# Patient Record
Sex: FEMALE | Race: BLACK OR AFRICAN AMERICAN | NOT HISPANIC OR LATINO | Employment: OTHER | URBAN - METROPOLITAN AREA
[De-identification: names, ages, dates, MRNs, and addresses within clinical notes are randomized per-mention and may not be internally consistent; named-entity substitution may affect disease eponyms.]

---

## 2017-04-13 ENCOUNTER — COMMUNICATION - HEALTHEAST (OUTPATIENT)
Dept: SCHEDULING | Facility: CLINIC | Age: 82
End: 2017-04-13

## 2017-04-13 ENCOUNTER — COMMUNICATION - HEALTHEAST (OUTPATIENT)
Dept: INTERNAL MEDICINE | Facility: CLINIC | Age: 82
End: 2017-04-13

## 2017-04-14 ENCOUNTER — OFFICE VISIT - HEALTHEAST (OUTPATIENT)
Dept: INTERNAL MEDICINE | Facility: CLINIC | Age: 82
End: 2017-04-14

## 2017-04-14 DIAGNOSIS — R07.9 CHEST PAIN: ICD-10-CM

## 2017-04-14 DIAGNOSIS — I10 ESSENTIAL HYPERTENSION WITH GOAL BLOOD PRESSURE LESS THAN 140/90: ICD-10-CM

## 2017-04-14 ASSESSMENT — MIFFLIN-ST. JEOR: SCORE: 864.12

## 2017-04-17 ENCOUNTER — HOSPITAL ENCOUNTER (OUTPATIENT)
Dept: CT IMAGING | Facility: CLINIC | Age: 82
Discharge: HOME | End: 2017-04-17
Attending: INTERNAL MEDICINE

## 2017-04-17 ENCOUNTER — COMMUNICATION - HEALTHEAST (OUTPATIENT)
Dept: INTERNAL MEDICINE | Facility: CLINIC | Age: 82
End: 2017-04-17

## 2017-04-17 DIAGNOSIS — R07.9 CHEST PAIN: ICD-10-CM

## 2017-05-03 ENCOUNTER — OFFICE VISIT - HEALTHEAST (OUTPATIENT)
Dept: CARDIOLOGY | Facility: CLINIC | Age: 82
End: 2017-05-03

## 2017-05-03 DIAGNOSIS — R07.9 CHEST PAIN, UNSPECIFIED: ICD-10-CM

## 2017-05-03 DIAGNOSIS — R53.83 OTHER MALAISE AND FATIGUE: ICD-10-CM

## 2017-05-03 DIAGNOSIS — M79.605 PAIN IN BOTH LOWER EXTREMITIES: ICD-10-CM

## 2017-05-03 DIAGNOSIS — R53.81 OTHER MALAISE AND FATIGUE: ICD-10-CM

## 2017-05-03 DIAGNOSIS — M79.604 PAIN IN BOTH LOWER EXTREMITIES: ICD-10-CM

## 2017-05-03 ASSESSMENT — MIFFLIN-ST. JEOR: SCORE: 864.12

## 2017-05-04 LAB
ATRIAL RATE - MUSE: 66 BPM
DIASTOLIC BLOOD PRESSURE - MUSE: NORMAL MMHG
INTERPRETATION ECG - MUSE: NORMAL
P AXIS - MUSE: 34 DEGREES
PR INTERVAL - MUSE: 166 MS
QRS DURATION - MUSE: 76 MS
QT - MUSE: 408 MS
QTC - MUSE: 427 MS
R AXIS - MUSE: -27 DEGREES
SYSTOLIC BLOOD PRESSURE - MUSE: NORMAL MMHG
T AXIS - MUSE: 28 DEGREES
VENTRICULAR RATE- MUSE: 66 BPM

## 2017-05-08 ENCOUNTER — HOSPITAL ENCOUNTER (OUTPATIENT)
Dept: CARDIOLOGY | Facility: CLINIC | Age: 82
Discharge: HOME | End: 2017-05-08
Attending: INTERNAL MEDICINE

## 2017-05-08 DIAGNOSIS — R07.9 CHEST PAIN, UNSPECIFIED: ICD-10-CM

## 2017-05-08 LAB
CV STRESS MAX HR HE: 144
ECHO EJECTION FRACTION ESTIMATED: 65 %
STRESS ECHO BASELINE BP: NORMAL M/S
STRESS ECHO BASELINE HR: 78 BPM
STRESS ECHO CALCULATED PERCENT HR: 102 %
STRESS ECHO LAST STRESS BP: NORMAL M/S
STRESS ECHO POST ESTIMATED WORKLOAD: 7.1 METS
STRESS ECHO POST EXERCISE DUR MIN: 5 MIN
STRESS ECHO POST EXERCISE DUR SEC: 30 SEC
STRESS ECHO TARGET HR: 120

## 2017-05-31 ENCOUNTER — COMMUNICATION - HEALTHEAST (OUTPATIENT)
Dept: INTERNAL MEDICINE | Facility: CLINIC | Age: 82
End: 2017-05-31

## 2017-06-02 ENCOUNTER — COMMUNICATION - HEALTHEAST (OUTPATIENT)
Dept: INTERNAL MEDICINE | Facility: CLINIC | Age: 82
End: 2017-06-02

## 2017-06-02 DIAGNOSIS — I10 ESSENTIAL HYPERTENSION: ICD-10-CM

## 2017-06-08 ENCOUNTER — OFFICE VISIT - HEALTHEAST (OUTPATIENT)
Dept: INTERNAL MEDICINE | Facility: CLINIC | Age: 82
End: 2017-06-08

## 2017-06-08 DIAGNOSIS — H54.7 IMPAIRED VISION: ICD-10-CM

## 2017-06-08 ASSESSMENT — MIFFLIN-ST. JEOR: SCORE: 847.11

## 2017-06-09 ENCOUNTER — COMMUNICATION - HEALTHEAST (OUTPATIENT)
Dept: INTERNAL MEDICINE | Facility: CLINIC | Age: 82
End: 2017-06-09

## 2017-10-15 ENCOUNTER — COMMUNICATION - HEALTHEAST (OUTPATIENT)
Dept: SCHEDULING | Facility: CLINIC | Age: 82
End: 2017-10-15

## 2017-10-16 ENCOUNTER — COMMUNICATION - HEALTHEAST (OUTPATIENT)
Dept: INTERNAL MEDICINE | Facility: CLINIC | Age: 82
End: 2017-10-16

## 2017-10-19 ENCOUNTER — OFFICE VISIT - HEALTHEAST (OUTPATIENT)
Dept: INTERNAL MEDICINE | Facility: CLINIC | Age: 82
End: 2017-10-19

## 2017-10-19 DIAGNOSIS — I10 ESSENTIAL HYPERTENSION: ICD-10-CM

## 2017-10-19 ASSESSMENT — MIFFLIN-ST. JEOR: SCORE: 857.31

## 2017-10-20 ENCOUNTER — COMMUNICATION - HEALTHEAST (OUTPATIENT)
Dept: INTERNAL MEDICINE | Facility: CLINIC | Age: 82
End: 2017-10-20

## 2017-10-20 ENCOUNTER — AMBULATORY - HEALTHEAST (OUTPATIENT)
Dept: INTERNAL MEDICINE | Facility: CLINIC | Age: 82
End: 2017-10-20

## 2018-01-25 ENCOUNTER — COMMUNICATION - HEALTHEAST (OUTPATIENT)
Dept: SCHEDULING | Facility: CLINIC | Age: 83
End: 2018-01-25

## 2018-01-25 ENCOUNTER — COMMUNICATION - HEALTHEAST (OUTPATIENT)
Dept: INTERNAL MEDICINE | Facility: CLINIC | Age: 83
End: 2018-01-25

## 2018-01-29 ENCOUNTER — OFFICE VISIT - HEALTHEAST (OUTPATIENT)
Dept: INTERNAL MEDICINE | Facility: CLINIC | Age: 83
End: 2018-01-29

## 2018-01-29 DIAGNOSIS — F41.9 ANXIETY: ICD-10-CM

## 2018-01-29 LAB — POTASSIUM BLD-SCNC: 4.3 MMOL/L (ref 3.5–5)

## 2018-01-29 ASSESSMENT — MIFFLIN-ST. JEOR: SCORE: 848.24

## 2018-01-30 ENCOUNTER — COMMUNICATION - HEALTHEAST (OUTPATIENT)
Dept: INTERNAL MEDICINE | Facility: CLINIC | Age: 83
End: 2018-01-30

## 2018-02-20 ENCOUNTER — COMMUNICATION - HEALTHEAST (OUTPATIENT)
Dept: INTERNAL MEDICINE | Facility: CLINIC | Age: 83
End: 2018-02-20

## 2018-02-24 ENCOUNTER — COMMUNICATION - HEALTHEAST (OUTPATIENT)
Dept: INTERNAL MEDICINE | Facility: CLINIC | Age: 83
End: 2018-02-24

## 2018-03-03 ENCOUNTER — COMMUNICATION - HEALTHEAST (OUTPATIENT)
Dept: INTERNAL MEDICINE | Facility: CLINIC | Age: 83
End: 2018-03-03

## 2018-03-03 DIAGNOSIS — I10 ESSENTIAL HYPERTENSION: ICD-10-CM

## 2018-03-15 ENCOUNTER — OFFICE VISIT - HEALTHEAST (OUTPATIENT)
Dept: INTERNAL MEDICINE | Facility: CLINIC | Age: 83
End: 2018-03-15

## 2018-03-15 DIAGNOSIS — I10 ESSENTIAL HYPERTENSION: ICD-10-CM

## 2018-03-15 ASSESSMENT — MIFFLIN-ST. JEOR: SCORE: 839.17

## 2018-03-19 ENCOUNTER — COMMUNICATION - HEALTHEAST (OUTPATIENT)
Dept: INTERNAL MEDICINE | Facility: CLINIC | Age: 83
End: 2018-03-19

## 2018-04-12 ENCOUNTER — OFFICE VISIT - HEALTHEAST (OUTPATIENT)
Dept: INTERNAL MEDICINE | Facility: CLINIC | Age: 83
End: 2018-04-12

## 2018-04-12 ENCOUNTER — COMMUNICATION - HEALTHEAST (OUTPATIENT)
Dept: SCHEDULING | Facility: CLINIC | Age: 83
End: 2018-04-12

## 2018-04-12 DIAGNOSIS — L30.9 DERMATITIS: ICD-10-CM

## 2018-04-12 DIAGNOSIS — R10.13 DYSPEPSIA: ICD-10-CM

## 2018-04-12 DIAGNOSIS — R12 HEARTBURN: ICD-10-CM

## 2018-04-12 ASSESSMENT — MIFFLIN-ST. JEOR: SCORE: 839.17

## 2018-04-23 ENCOUNTER — COMMUNICATION - HEALTHEAST (OUTPATIENT)
Dept: INTERNAL MEDICINE | Facility: CLINIC | Age: 83
End: 2018-04-23

## 2018-04-27 ENCOUNTER — COMMUNICATION - HEALTHEAST (OUTPATIENT)
Dept: SCHEDULING | Facility: CLINIC | Age: 83
End: 2018-04-27

## 2018-04-30 ENCOUNTER — PATIENT OUTREACH (OUTPATIENT)
Dept: CARE COORDINATION | Facility: CLINIC | Age: 83
End: 2018-04-30

## 2018-04-30 ENCOUNTER — COMMUNICATION - HEALTHEAST (OUTPATIENT)
Dept: SCHEDULING | Facility: CLINIC | Age: 83
End: 2018-04-30

## 2018-04-30 NOTE — LETTER
[facility]  Complex Care Plan  About Me  Patient Name:  Helen Napoles    YOB: 1935  Age:     82 year old   William MRN:   [id number] Telephone Information:    Home Phone [phone]   Mobile Not on file.       Address:    [address]  [place] [address] Email address:  No e-mail address on record      Emergency Contact(s)  Name Relationship Lgl Grd Work Phone Home Phone Mobile Phone        Current living arrangement: I live alone  Mobility Status/ Medical Equipment: Independent    Health Maintenance  Health Maintenance Reviewed: Due/Overdue     My Access Plan  Medical Emergency 911   Primary Clinic Line   [phone]     24 Hour Appointment Line [phone] or  [phone]) (toll-free)   24 Hour Nurse Line [phone] (toll-free)   Preferred Urgent Care [facility], [phone]   [facility]  [phone]   Preferred Pharmacy No Pharmacies Listed   Behavioral Health Crisis Line The National Suicide Prevention Lifeline at 1-654.319.8113 or 917     My Care Team Members    Patient Care Team       Relationship Specialty Notifications Start End    Denver Rain MD PCP - General Internal Medicine  4/30/18     Phone: [phone] Fax: [phone]         [facility] [address] [facility] [address]    Nuvia Pearce LSW Lead Care Coordinator Primary Care - CC  4/30/18     Phone: [phone] Fax: [phone]                My Care Plans  Self Management and Treatment Plan  Goals and (Comments)  Goals        General    Healthy Eating (pt-stated)     Notes - Note created  4/30/2018  3:48 PM by Nuvia Pearce LSW    Goal Statement: I do not want to lose any more weight and will eat healthy.  CC to assist with support and resources.   Measure of Success: I want to weigh more than 105 pounds.   Supportive Steps to Achieve: I will eat healthy meals, low is sodium.     Barriers: Does not know why she keeps losing weight.   Strengths: Exercises and eats well. Cooks her meals.   Date to Achieve By: August 30, 2018.                        My Medical and Care Information    Care Coordination Start Date: 4/30/2018   Frequency of Care Coordination: monthly   Form Last Updated: 05/04/2018

## 2018-04-30 NOTE — PROGRESS NOTES
Clinic Care Coordination Contact    OUTREACH    Referral Information:  Referral Source: ED Follow-Up    Primary Diagnosis: GI Disorders    Chief Complaint   Patient presents with     Clinic Care Coordination - [facility]     [facility] ED visit on 4/27/18        Universal Utilization: appropriate utilization  Utilization    Last refreshed: 4/30/2018  3:41 PM:  No Show Count (past year) 0       Last refreshed: 4/30/2018  3:41 PM:  ED visits 0       Last refreshed: 4/30/2018  3:41 PM:  Hospital admissions 0          Current as of: 4/30/2018  3:41 PM           Current Medical Concerns: Went to ED upon PCP advice for dark stools. She was examined and released instructing her to have f/u appointment with PCP. She had colonoscopy a year ago.  Had some bloating this morning, but it went away.  Is concerned about losing weight even though she thinks she is eating enough.    Is working with her dentist as she may have gum disease as she is having loose teeth.  Has fibromyalgia and has pain sometimes.  Her potassium was high in ED, so she is limiting her banana intake. She is watching her sodium intake as well.   Current Behavioral Concerns: No concerns.     Education Provided to patient: Reviewed care coordination and how to utilize this service.     Clinical Pathway Name: None  Health Maintenance Reviewed: Due/Overdue     Medication Management:  No concerns.  Is taking her prescriptions but does not like taking medications and she is taking four meds.      Functional Status:  Mobility Status: Independent  Equipment Currently Used at Home: none  Transportation means:: Regular car, drives herself.      Psychosocial:  Current living arrangement:: I live alone. Has daughter in [place] and son in [place]. Son has a mother in law apartment for her if she needs more help in the future. For now, she does not want to move.    Type of residence:: Apartment  Financial/Insurance: [facility].      Resources and  Interventions:  Current Resources:  NA   Advanced Care Plans/Directives on file:: No        Goals:   Goals        General    Healthy Eating (pt-stated)     Notes - Note created  4/30/2018  3:48 PM by Nuvia Pearce LSW    Goal Statement: I do not want to lose any more weight and will eat healthy.  CC to assist with support and resources.   Measure of Success: I want to weigh more than 105 pounds.   Supportive Steps to Achieve: I will eat healthy meals, low is sodium.    Barriers: Does not know why she keeps losing weight.   Strengths: Exercises and eats well. Cooks her meals.   Date to Achieve By: August 30, 2018.               Patient/Caregiver understanding: Patient would like to work with CC to address her concern about weight loss.  She is going on vacation May 11-18 and CC will call late May.  She will make appointment to see her PCP for post ED exam.   Outreach Frequency: monthly       Plan: CC to send complex care letter and to call in one month.      Nuvia Pearce,   [facility]  [email or web address]  [phone]

## 2018-04-30 NOTE — LETTER
[place] CARE COORDINATION  [facility] [address]  [facility] [address]    May 4, 2018    Helen Napoles  [address]   [place] [address]      Dear Helen,    I am a clinic care coordinator who works with Denver Rain MD at [phone]. I wanted to thank you for spending the time to talk with me.  I wanted to introduce myself and provide you with my contact information so that you can call me with questions or concerns about your health care. Below is a description of clinic care coordination and how I can further assist you.     The clinic care coordinator is a registered nurse and/or  who understand the health care system. The goal of clinic care coordination is to help you manage your health and improve access to the [facility] system in the most efficient manner. The registered nurse can assist you in meeting your health care goals by providing education, coordinating services, and strengthening the communication among your providers. The  can assist you with financial, behavioral, psychosocial, chemical dependency, counseling, and/or psychiatric resources.    Please feel free to contact me at [phone], with any questions or concerns. We at [facility] are focused on providing you with the highest-quality healthcare experience possible and that all starts with you.     Sincerely,     Nuvia Pearce    Enclosed: I have enclosed a copy of the Complex Care Plan. This has helpful information and goals that we have talked about. Please keep this in an easy to access place to use as needed.

## 2018-05-29 ENCOUNTER — OFFICE VISIT - HEALTHEAST (OUTPATIENT)
Dept: INTERNAL MEDICINE | Facility: CLINIC | Age: 83
End: 2018-05-29

## 2018-05-29 DIAGNOSIS — I10 ESSENTIAL HYPERTENSION: ICD-10-CM

## 2018-05-29 LAB — HGB BLD-MCNC: 13.2 G/DL (ref 12–16)

## 2018-05-29 ASSESSMENT — MIFFLIN-ST. JEOR: SCORE: 823.3

## 2018-06-01 ENCOUNTER — PATIENT OUTREACH (OUTPATIENT)
Dept: CARE COORDINATION | Facility: CLINIC | Age: 83
End: 2018-06-01

## 2018-06-01 ENCOUNTER — COMMUNICATION - HEALTHEAST (OUTPATIENT)
Dept: SCHEDULING | Facility: CLINIC | Age: 83
End: 2018-06-01

## 2018-06-01 NOTE — PROGRESS NOTES
Clinic Care Coordination Contact  [facility]/Voicemail     Reached patient but she was busy. She had PCP exam this past week. Wants to talk on Monday when she has more time.   Clinical Data: Care Coordinator Outreach  Outreach attempted x 1.  Left message on voicemail with call back information and requested return call.  Plan: Care Coordinator will try to reach patient again in 1-2 business days.  Nuvia Pearce,   [facility]  [email or web address]  [phone]

## 2018-06-04 ENCOUNTER — COMMUNICATION - HEALTHEAST (OUTPATIENT)
Dept: SCHEDULING | Facility: CLINIC | Age: 83
End: 2018-06-04

## 2018-06-04 ENCOUNTER — COMMUNICATION - HEALTHEAST (OUTPATIENT)
Dept: INTERNAL MEDICINE | Facility: CLINIC | Age: 83
End: 2018-06-04

## 2018-06-04 NOTE — PROGRESS NOTES
Clinic Care Coordination Contact    Clinic Care Coordination Contact  OUTREACH    Referral Information:       Chief Complaint   Patient presents with     Clinic Care Coordination - Follow-up        Universal Utilization: Appropriate utilization.      Utilization    Last refreshed: 6/4/2018 11:01 AM:  No Show Count (past year) 0       Last refreshed: 6/4/2018 11:01 AM:  ED visits 0       Last refreshed: 6/4/2018 11:01 AM:  Hospital admissions 0          Current as of: 6/4/2018 11:01 AM           Clinical Concerns:  Current Medical Concerns:  Met with PCP last week and lost a pound. Weighs 102 and she does not want to lose any more weight.  Is eating 2-3 meals a day.  Eating healthy meals.  Does not like to use medications unless she has to.  Her PCP understands that.    Current Behavioral Concerns: None identified.     Education Provided to patient: None.       Health Maintenance Reviewed:    Up to date.     Medication Management:  No concerns.     Functional Status:   Independent.     Living Situation:   Lives alone.     Diet/Exercise/Sleep:   Is eating healthy. Starting to incorporate glutin back into her diet.  Continues to exercise to stay strong. Encouraged patient to add a snack to her day.      Transportation:   Drives herself and has family drive.     Psychosocial:   NA    Financial/Insurance:      No concerns identified.      Resources and Interventions:  Current Resources:    na     Goals:   Goals        General    Healthy Eating (pt-stated)     Notes - Note created  4/30/2018  3:48 PM by Nuvia Pearce LSW    Goal Statement: I do not want to lose any more weight and will eat healthy.  CC to assist with support and resources.   Measure of Success: I want to weigh more than 105 pounds.   Supportive Steps to Achieve: I will eat healthy meals, low is sodium.    Barriers: Does not know why she keeps losing weight.   Strengths: Exercises and eats well. Cooks her meals.   Date to Achieve By: August 30, 2018.                  Patient/Caregiver understanding:She will call CC if concerns arise prior to next outreach by CC.     Plan: CC to call in one month to check on goal.     Nuvia Pearce,   [facility]  [email or web address]  [phone]

## 2018-06-04 NOTE — PROGRESS NOTES
Clinic Care Coordination Contact  [facility] [[email or web address]]/Voicemail       Clinical Data: Care Coordinator Outreach  Outreach attempted x 1.  Mail box is full  Plan:  Care Coordinator will try to reach patient again in 1-2 business days.  Nuvia Pearce,   [facility]  [email or web address]  [phone]

## 2018-06-11 ENCOUNTER — COMMUNICATION - HEALTHEAST (OUTPATIENT)
Dept: INTERNAL MEDICINE | Facility: CLINIC | Age: 83
End: 2018-06-11

## 2018-06-12 ENCOUNTER — COMMUNICATION - HEALTHEAST (OUTPATIENT)
Dept: INTERNAL MEDICINE | Facility: CLINIC | Age: 83
End: 2018-06-12

## 2018-06-25 ENCOUNTER — OFFICE VISIT - HEALTHEAST (OUTPATIENT)
Dept: INTERNAL MEDICINE | Facility: CLINIC | Age: 83
End: 2018-06-25

## 2018-06-25 DIAGNOSIS — I10 ESSENTIAL HYPERTENSION: ICD-10-CM

## 2018-06-25 ASSESSMENT — MIFFLIN-ST. JEOR: SCORE: 814.23

## 2018-07-06 ENCOUNTER — COMMUNICATION - HEALTHEAST (OUTPATIENT)
Dept: SCHEDULING | Facility: CLINIC | Age: 83
End: 2018-07-06

## 2018-07-06 ENCOUNTER — PATIENT OUTREACH (OUTPATIENT)
Dept: CARE COORDINATION | Facility: CLINIC | Age: 83
End: 2018-07-06

## 2018-07-06 ASSESSMENT — ACTIVITIES OF DAILY LIVING (ADL): DEPENDENT_IADLS:: INDEPENDENT

## 2018-07-06 NOTE — PROGRESS NOTES
Clinic Care Coordination Contact    Clinic Care Coordination Contact  OUTREACH    Referral Information:  Referral Source: ED Follow-Up    Primary Diagnosis: GI Disorders    Chief Complaint   Patient presents with     Clinic Care Coordination - Follow-up        Universal Utilization: Appropriate utilization  Clinic Utilization  Difficulty keeping appointments:: No  Utilization    Last refreshed: 7/4/2018  5:18 AM:  No Show Count (past year) 0       Last refreshed: 7/4/2018  5:18 AM:  ED visits 0       Last refreshed: 7/4/2018  5:18 AM:  Hospital admissions 0          Current as of: 7/4/2018  5:18 AM             Clinical Concerns:  Current Medical Concerns:  Met with her PCP recently and will continue with her medications though she does not like to take any medications.  Doing well.     Current Behavioral Concerns: Trying to decide if she should move in with either of her two sons.  One lives in [place]. She wouldn't have to pay rent which makes that attractive, but has strong family here. Weighing her options.  Discussed keeping her apartment since she could pay the rent and still move just for a trial period.  She is in a subsidized apartment and wouldn't want to lose it if she wanted to move back.  Her current apartment is satisfactory.  Low dose of antidepressant.     Education Provided to patient: None provided.    Pain  Chronic pain (GOAL):: No  Health Maintenance Reviewed: Due/Overdue       Medication Management:  No concerns.  Is taking as prescribed.     Functional Status:  Dependent ADLs:: Ambulation-no assistive device  Dependent IADLs:: Independent  Bed or wheelchair confined:: No  Mobility Status: Independent  Fallen 2 or more times in the past year?: No  Any fall with injury in the past year?: No    Living Situation:  Current living arrangement:: I live alone  Type of residence:: Apartment    Diet/Exercise/Sleep:  Diet:: Regular  Inadequate nutrition (GOAL):: No  Food Insecurity: No  Tube Feeding:  No  Exercise:: Yes  Inadequate activity/exercise (GOAL):: No  Significant changes in sleep pattern (GOAL): No    Transportation:  Transportation concerns (GOAL):: No  Transportation means:: Regular car     Psychosocial:  Orthodox or spiritual beliefs that impact treatment:: No  Mental health DX:: Yes  Mental health management concern (GOAL):: No  Informal Support system:: Family, Children, Lisa based     Financial/Insurance:   Financial/Insurance concerns (GOAL):: No  [employer] for seniors.       Resources and Interventions:  Current Resources:      Equipment Currently Used at Home: none    Advance Care Plan/Directive  Advanced Care Plans/Directives on file:: No       Goals:   Goals        General    Healthy Eating (pt-stated)     Notes - Note created  4/30/2018  3:48 PM by Nuvia Pearce LSW    Goal Statement: I do not want to lose any more weight and will eat healthy.  CC to assist with support and resources.   Measure of Success: I want to weigh more than 105 pounds.   Supportive Steps to Achieve: I will eat healthy meals, low is sodium.    Barriers: Does not know why she keeps losing weight.   Strengths: Exercises and eats well. Cooks her meals.   Date to Achieve By: August 30, 2018.                 Patient/Caregiver understanding: She weighs 100 pounds. Has tried to introduce glutin back into her diet with mixed results.  Is eating healthy and doing her exercises.  Appreciates the CC calls.     Outreach Frequency: monthly    Plan: CC to call in one month. Patient to call with concerns.     Nuvia Pearce,   [facility]  [email or web address]  [phone]

## 2018-08-08 ENCOUNTER — COMMUNICATION - HEALTHEAST (OUTPATIENT)
Dept: SCHEDULING | Facility: CLINIC | Age: 83
End: 2018-08-08

## 2018-08-08 ENCOUNTER — PATIENT OUTREACH (OUTPATIENT)
Dept: CARE COORDINATION | Facility: CLINIC | Age: 83
End: 2018-08-08

## 2018-08-08 ASSESSMENT — ACTIVITIES OF DAILY LIVING (ADL): DEPENDENT_IADLS:: INDEPENDENT

## 2018-08-08 NOTE — PROGRESS NOTES
Clinic Care Coordination Contact    Clinic Care Coordination Contact  OUTREACH    Referral Information:  Referral Source: ED Follow-Up    Primary Diagnosis: GI Disorders    Chief Complaint   Patient presents with     Clinic Care Coordination - Follow-up        Universal Utilization: Appropriate utilization  Clinic Utilization  Difficulty keeping appointments:: No  Utilization    Last refreshed: 7/18/2018 11:36 PM:  No Show Count (past year) 0       Last refreshed: 7/18/2018 11:36 PM:  ED visits 0       Last refreshed: 7/18/2018 11:36 PM:  Hospital admissions 0          Current as of: 7/18/2018 11:36 PM             Clinical Concerns:  Current Medical Concerns:  Has infection in gum and she consulted her dentist who prescribed antibiotics. She has concerns about the dose and how long she is supposed to take them. She asked her pharmacist to call dentist and dentist confirmed that it was the correct dose and length of time.  She needs to clear up the infection so she can have further dental work done.   No side effects from the antibiotic.    Current Behavioral Concerns: None identified.  Doing well.     Education Provided to patient: None provided.    Pain  Chronic pain (GOAL):: No  Health Maintenance Reviewed: Due/Overdue   Clinical Pathway: None    Medication Management:  Independent and no concerns with usual medications.       Functional Status:  Dependent ADLs:: Ambulation-no assistive device  Dependent IADLs:: Independent  Bed or wheelchair confined:: No  Mobility Status: Independent  Fallen 2 or more times in the past year?: No  Any fall with injury in the past year?: No    Living Situation:  Current living arrangement:: I live alone  Type of residence:: Apartment    Diet/Exercise/Sleep:  Diet:: Regular  Food Insecurity: No  Tube Feeding: No  Exercise:: Yes  Inadequate activity/exercise (GOAL):: No  Significant changes in sleep pattern (GOAL): No    Transportation:  Transportation concerns (GOAL)::  No  Transportation means:: Regular car     Psychosocial:  Congregational or spiritual beliefs that impact treatment:: No  Mental health DX:: Yes  Mental health DX how managed:: Medication  Mental health management concern (GOAL):: No  Informal Support system:: Family, Children, Lisa based     Financial/Insurance:   Financial/Insurance concerns (GOAL):: No  No concerns.  [employer] for Seniors.      Resources and Interventions:  Current Resources:      Community Resources: None  Supplies used at home:: None  Equipment Currently Used at Home: none    Advance Care Plan/Directive  Advanced Care Plans/Directives on file:: No    Referrals Placed: None     Goals:   Goals        General    Healthy Eating (pt-stated)     Notes - Note created  4/30/2018  3:48 PM by Nuvia Pearce LSW    Goal Statement: I do not want to lose any more weight and will eat healthy.  CC to assist with support and resources.   Measure of Success: I want to weigh more than 105 pounds.   Supportive Steps to Achieve: I will eat healthy meals, low is sodium.    Barriers: Does not know why she keeps losing weight.   Strengths: Exercises and eats well. Cooks her meals.   Date to Achieve By: August 30, 2018.                 Patient/Caregiver understanding: She is drinking lots of water and eating well. Trying to snack during the day as well. Thinks her weight is stable.  She has a 22 year old grandson with schizophrenia who lives on his own.  She and her  helped raise him.  His mother now wants to be part of his life again and patient thinks that is a good thing.  Still wrestling with the thought of moving to [place] to her son's home. He has two dogs and she may not want to live with two dogs. Her sister lives in [place] and she would be close.  She is getting adequate rest and is feeling well. Her phone has poor service in her apartment and it was cut off three times during the call.  She appreciates CCC support.      Outreach Frequency:  monthly      Plan: CCC to call in one month to assess needs.

## 2018-09-20 ENCOUNTER — COMMUNICATION - HEALTHEAST (OUTPATIENT)
Dept: SCHEDULING | Facility: CLINIC | Age: 83
End: 2018-09-20

## 2018-09-20 ENCOUNTER — PATIENT OUTREACH (OUTPATIENT)
Dept: CARE COORDINATION | Facility: CLINIC | Age: 83
End: 2018-09-20

## 2018-09-20 NOTE — PROGRESS NOTES
Clinic Care Coordination Contact  [facility]       Clinical Data: Care Coordinator Outreach  Outreach attempted x 1.  Voice mail was full.  Plan: . Care Coordinator will try to reach patient again in 3-5 business days.  Nuvia Pearce   [facility]  [email or web address]  [phone]    Clinic Care Coordination Contact  [facility]       Clinical Data: Care Coordinator Outreach  Outreach attempted x 2. Voice mail is full.  Plan: Care Coordinator will mail out care coordination introduction letter with care coordinator contact information and explanation of care coordination services. Care Coordinator will do no further outreaches at this time.  Social Aman Liu  [facility]  [email or web address]  [phone]

## 2018-09-20 NOTE — LETTER
[facility] [address]  [facility] [address]    October 9, 2018    Helen Napoles  [address]   [place] [address]      Dear Helen,    I am a clinic care coordinator who works with Denver Rain MD at [phone]. I recently tried to call and was unable to reach you. I wanted to introduce myself and provide you with my contact information so that you can call me with questions or concerns about your health care. Below is a description of clinic care coordination and how I can further assist you.     The clinic care coordinator is a registered nurse and/or  who understand the health care system. The goal of clinic care coordination is to help you manage your health and improve access to the [place] system in the most efficient manner. The registered nurse can assist you in meeting your health care goals by providing education, coordinating services, and strengthening the communication among your providers. The  can assist you with financial, behavioral, psychosocial, chemical dependency, counseling, and/or psychiatric resources.    Please feel free to contact me at [phone], with any questions or concerns. We at [place] are focused on providing you with the highest-quality healthcare experience possible and that all starts with you.     Sincerely,     Nuvia Pearce

## 2018-10-15 ENCOUNTER — COMMUNICATION - HEALTHEAST (OUTPATIENT)
Dept: SCHEDULING | Facility: CLINIC | Age: 83
End: 2018-10-15

## 2018-10-15 NOTE — PROGRESS NOTES
Clinic Care Coordination Contact    Clinic Care Coordination Contact  OUTREACH    Referral Information:       Primary Diagnosis: GI Disorders    Chief Complaint   Patient presents with     Clinic Care Coordination - Follow-up       Clinical Concerns:  Current Medical Concerns:  Is taking a vitamin to see if that helps her with keeping her weight.  She is trying to eat well.  Has dental issues and has consulted with her dentist who is recommending a bridge. It would be costly, but she does have dental insurance and could pay on installments.  She had abscess and dentist thinks she will continue to have infection until the tooth is removed. She is considering options. She is not taking her blood pressure anymore as it made her too anxious.     Financial/Insurance:   Financial/Insurance concerns (GOAL):: No  Wants information on what insurance she could have if she moved out of [place]. She will contact [facility] as she has been happy with her plan and would like to keep it if possible.       Goals:   Goals        General    Healthy Eating (pt-stated)     Notes - Note edited  10/15/2018  4:23 PM by Nuvia Pearce LSW    Goal Statement: I do not want to lose any more weight and will eat healthy.  CC to assist with support and resources.   Measure of Success: I want to weigh more than 105 pounds.   Supportive Steps to Achieve: I will eat healthy meals, low is sodium.    Barriers: Does not know why she keeps losing weight.   Strengths: Exercises and eats well. Cooks her meals.   Date to Achieve By: January 30, 2019.               Patient/Caregiver understanding: Thinking of moving in with her son for three months, her cousin for several months and then go to sister's.  She would sell her things and rely on others for housing. She can identify good things about moving as she would like to be near family.  She also has her [employer] and volunteer work here.  Likes talking to CC about these decisions.  She has a new phone, but she is  not good about retrieving messages from it.      Outreach Frequency: monthly    Plan: CC will call in one month.     Nuvia Pearce,   [facility]  [email or web address]  [phone]

## 2018-10-25 ENCOUNTER — COMMUNICATION - HEALTHEAST (OUTPATIENT)
Dept: SCHEDULING | Facility: CLINIC | Age: 83
End: 2018-10-25

## 2018-10-25 ENCOUNTER — PATIENT OUTREACH (OUTPATIENT)
Dept: CARE COORDINATION | Facility: CLINIC | Age: 83
End: 2018-10-25

## 2018-10-25 ASSESSMENT — ACTIVITIES OF DAILY LIVING (ADL): DEPENDENT_IADLS:: INDEPENDENT

## 2018-10-25 NOTE — PROGRESS NOTES
Clinic Care Coordination Contact    Clinic Care Coordination Contact  OUTREACH    Referral Information:  Referral Source: ED Follow-Up    Primary Diagnosis: GI Disorders    Chief Complaint   Patient presents with     Clinic Care Coordination - Follow-up              Call from patient. She got an absentee ballot for her  who  several years ago and didn't know what to do with it.  She also wanted an absentee ballot mailed to her.     Referrals Placed: Other  ([facility] )     Goals:   Goals        General    Healthy Eating (pt-stated)     Notes - Note edited  10/15/2018  4:23 PM by Nuvia Pearce LSW    Goal Statement: I do not want to lose any more weight and will eat healthy.  CC to assist with support and resources.   Measure of Success: I want to weigh more than 105 pounds.   Supportive Steps to Achieve: I will eat healthy meals, low is sodium.    Barriers: Does not know why she keeps losing weight.   Strengths: Exercises and eats well. Cooks her meals.   Date to Achieve By: 2019.                 Patient/Caregiver understanding: Gave her address for [facility] Elections in [place] and she will go there to vote and let them know her  has  and to remove his name from the voter rolls.  She appreciated the help.     Outreach Frequency: monthly      Plan: Call in one month to assess needs.     Nuvia Pearce,   [facility]  [email or web address]  [phone]

## 2018-12-04 ENCOUNTER — PATIENT OUTREACH (OUTPATIENT)
Dept: CARE COORDINATION | Facility: CLINIC | Age: 83
End: 2018-12-04

## 2018-12-04 ENCOUNTER — COMMUNICATION - HEALTHEAST (OUTPATIENT)
Dept: SCHEDULING | Facility: CLINIC | Age: 83
End: 2018-12-04

## 2018-12-04 NOTE — PROGRESS NOTES
Clinic Care Coordination Contact  [facility]       Clinical Data: Care Coordinator Outreach  Outreach attempted x 1.  Left message on voicemail with call back information and requested return call.  Plan: Care Coordinator will try to reach patient again in 5-10 business days.  Nuvia Pearce   [facility]  [email or web address]  [phone]    Clinic Care Coordination Contact  [facility]/Voicemail       Clinical Data: Care Coordinator Outreach  Outreach attempted x 2.  Left message on voicemail with call back information and requested return call.  Plan:Care Coordinator will try to reach patient again in 5-10 business days.  Social Noe Worker  [facility]  [email or web address]  [phone]

## 2018-12-15 ENCOUNTER — OFFICE VISIT - HEALTHEAST (OUTPATIENT)
Dept: FAMILY MEDICINE | Facility: CLINIC | Age: 83
End: 2018-12-15

## 2018-12-15 DIAGNOSIS — J06.9 UPPER RESPIRATORY TRACT INFECTION, UNSPECIFIED TYPE: ICD-10-CM

## 2018-12-26 ENCOUNTER — COMMUNICATION - HEALTHEAST (OUTPATIENT)
Dept: SCHEDULING | Facility: CLINIC | Age: 83
End: 2018-12-26

## 2018-12-31 ENCOUNTER — COMMUNICATION - HEALTHEAST (OUTPATIENT)
Dept: SCHEDULING | Facility: CLINIC | Age: 83
End: 2018-12-31

## 2018-12-31 ENCOUNTER — COMMUNICATION - HEALTHEAST (OUTPATIENT)
Dept: INTERNAL MEDICINE | Facility: CLINIC | Age: 83
End: 2018-12-31

## 2018-12-31 DIAGNOSIS — F41.9 ANXIETY: ICD-10-CM

## 2018-12-31 DIAGNOSIS — J06.9 UPPER RESPIRATORY TRACT INFECTION, UNSPECIFIED TYPE: ICD-10-CM

## 2019-01-02 ENCOUNTER — COMMUNICATION - HEALTHEAST (OUTPATIENT)
Dept: INTERNAL MEDICINE | Facility: CLINIC | Age: 84
End: 2019-01-02

## 2019-01-02 ENCOUNTER — OFFICE VISIT - HEALTHEAST (OUTPATIENT)
Dept: INTERNAL MEDICINE | Facility: CLINIC | Age: 84
End: 2019-01-02

## 2019-01-02 DIAGNOSIS — I10 ESSENTIAL HYPERTENSION: ICD-10-CM

## 2019-01-02 DIAGNOSIS — J06.9 UPPER RESPIRATORY TRACT INFECTION, UNSPECIFIED TYPE: ICD-10-CM

## 2019-01-02 ASSESSMENT — MIFFLIN-ST. JEOR: SCORE: 809.69

## 2019-01-11 ENCOUNTER — OFFICE VISIT - HEALTHEAST (OUTPATIENT)
Dept: INTERNAL MEDICINE | Facility: CLINIC | Age: 84
End: 2019-01-11

## 2019-01-11 ENCOUNTER — COMMUNICATION - HEALTHEAST (OUTPATIENT)
Dept: INTERNAL MEDICINE | Facility: CLINIC | Age: 84
End: 2019-01-11

## 2019-01-11 ENCOUNTER — AMBULATORY - HEALTHEAST (OUTPATIENT)
Dept: INTERNAL MEDICINE | Facility: CLINIC | Age: 84
End: 2019-01-11

## 2019-01-11 ENCOUNTER — RECORDS - HEALTHEAST (OUTPATIENT)
Dept: GENERAL RADIOLOGY | Facility: CLINIC | Age: 84
End: 2019-01-11

## 2019-01-11 DIAGNOSIS — R05.9 COUGH: ICD-10-CM

## 2019-01-11 LAB
ALBUMIN SERPL-MCNC: 3.7 G/DL (ref 3.5–5)
ALP SERPL-CCNC: 80 U/L (ref 45–120)
ALT SERPL W P-5'-P-CCNC: 21 U/L (ref 0–45)
ANION GAP SERPL CALCULATED.3IONS-SCNC: 13 MMOL/L (ref 5–18)
AST SERPL W P-5'-P-CCNC: 21 U/L (ref 0–40)
BILIRUB SERPL-MCNC: 1.5 MG/DL (ref 0–1)
BUN SERPL-MCNC: 15 MG/DL (ref 8–28)
CALCIUM SERPL-MCNC: 9.3 MG/DL (ref 8.5–10.5)
CHLORIDE BLD-SCNC: 103 MMOL/L (ref 98–107)
CO2 SERPL-SCNC: 24 MMOL/L (ref 22–31)
CREAT SERPL-MCNC: 0.92 MG/DL (ref 0.6–1.1)
ERYTHROCYTE [DISTWIDTH] IN BLOOD BY AUTOMATED COUNT: 13.1 % (ref 11–14.5)
GFR SERPL CREATININE-BSD FRML MDRD: 59 ML/MIN/1.73M2
GLUCOSE BLD-MCNC: 104 MG/DL (ref 70–125)
HCT VFR BLD AUTO: 43.7 % (ref 35–47)
HGB BLD-MCNC: 13.5 G/DL (ref 12–16)
MCH RBC QN AUTO: 27.6 PG (ref 27–34)
MCHC RBC AUTO-ENTMCNC: 30.9 G/DL (ref 32–36)
MCV RBC AUTO: 89 FL (ref 80–100)
PLATELET # BLD AUTO: 287 THOU/UL (ref 140–440)
PMV BLD AUTO: 9.5 FL (ref 8.5–12.5)
POTASSIUM BLD-SCNC: 4.4 MMOL/L (ref 3.5–5)
PROT SERPL-MCNC: 7.1 G/DL (ref 6–8)
RBC # BLD AUTO: 4.89 MILL/UL (ref 3.8–5.4)
SODIUM SERPL-SCNC: 140 MMOL/L (ref 136–145)
WBC: 5 THOU/UL (ref 4–11)

## 2019-01-11 ASSESSMENT — MIFFLIN-ST. JEOR: SCORE: 809.69

## 2019-01-14 ENCOUNTER — COMMUNICATION - HEALTHEAST (OUTPATIENT)
Dept: INTERNAL MEDICINE | Facility: CLINIC | Age: 84
End: 2019-01-14

## 2019-01-15 ENCOUNTER — COMMUNICATION - HEALTHEAST (OUTPATIENT)
Dept: SCHEDULING | Facility: CLINIC | Age: 84
End: 2019-01-15

## 2019-01-15 ENCOUNTER — PATIENT OUTREACH (OUTPATIENT)
Dept: CARE COORDINATION | Facility: CLINIC | Age: 84
End: 2019-01-15

## 2019-01-15 NOTE — PROGRESS NOTES
Clinic Care Coordination Contact    Clinic Care Coordination Contact  OUTREACH    Clinical Concerns:  Current Medical Concerns:  Ongoing coughing. Has completed Z pack and has seen her PCP and used walk in clinic during past month.  Is waiting for results of blood work done last week.  She is not taking vitamins at this time due to stomach upset.            Goals:   Goals        General    Healthy Eating (pt-stated)     Notes - Note edited  10/15/2018  4:23 PM by Nuvia Pearce LSW    Goal Statement: I do not want to lose any more weight and will eat healthy.  CC to assist with support and resources.   Measure of Success: I want to weigh more than 105 pounds.   Supportive Steps to Achieve: I will eat healthy meals, low is sodium.    Barriers: Does not know why she keeps losing weight.   Strengths: Exercises and eats well. Cooks her meals.   Date to Achieve By: January 30, 2019.             Patient/Caregiver understanding: Patient lost a pound and now weighs 98 pounds.  She is sleeping ok, and trying to eat regularly.  She is weighing her options about moving to [place] with her son. She found out her health insurance allows her to be out of state for 3 months without losing insurance.  She also has a close cousin who wants her to move in with her.       Outreach Frequency: monthly    Plan: Patient to notify CC of any needs prior to next outreach in one month.    Nuvia Pearce,   [facility]  [email or web address]  [phone]

## 2019-02-19 ENCOUNTER — COMMUNICATION - HEALTHEAST (OUTPATIENT)
Dept: INTERNAL MEDICINE | Facility: CLINIC | Age: 84
End: 2019-02-19

## 2019-02-25 ENCOUNTER — PATIENT OUTREACH (OUTPATIENT)
Dept: CARE COORDINATION | Facility: CLINIC | Age: 84
End: 2019-02-25

## 2019-02-25 ENCOUNTER — COMMUNICATION - HEALTHEAST (OUTPATIENT)
Dept: SCHEDULING | Facility: CLINIC | Age: 84
End: 2019-02-25

## 2019-02-25 NOTE — PROGRESS NOTES
Clinic Care Coordination Contact        Goals:   Goals        General    Healthy Eating (pt-stated)     Notes - Note edited  2/25/2019  1:02 PM by Nuvia Pearce LSW    Goal Statement: I do not want to lose any more weight and will eat healthy.  CC to assist with support and resources.   Measure of Success: I want to weigh more than 105 pounds.   Supportive Steps to Achieve: I will eat healthy meals, low is sodium.    Barriers: Does not know why she keeps losing weight.   Strengths: Exercises and eats well. Cooks her meals.   Date to Achieve By: March 30, 2019.                 Patient/Caregiver understanding: Call to patient and she thinks she has gained a pound.  Is going to set up appointment to get her hearing checked.  Informed     Patient that this CC is not supporting this clinic after today.  She appreciated the calls from CC and would like to continue to have a care coordinator call her and is open to receiving a call from HE care coordinator to start services.  Having a monthly call keeps her motivated.     CC will ask PCP to order care coordination.      Plan: No further outreach by this CC.   Nuvia Pearce,   [facility]  [email or web address]  [phone]        Clinic Care Coordination Contact    Clinical Concerns:  Current Medical Concerns:  Crown fell off and she is getting it reglued tomorrow.  Found that she is intolerant of red dye in her food and has been avoiding that and gluten with good results.      Current Behavioral Concerns: Worried about her grandson who has been diagnosed with Schizophrenia. She and her daughter are educating themselves on this.  Her step grandson was murdered in [place] several weeks ago.  Trying to understand why and how that happened.        Diet/Exercise/Sleep:  Diet:: Regular    Exercise:: Yes  Days per week of moderate to strenuous exercise (like a brisk walk): 7  On average, minutes per day of exercise at this level: 30  How intense was  your typical exercise? : Light (like stretching or slow walking)  Exercise Minutes per Week: 210              Goals:   Goals        General    Healthy Eating (pt-stated)     Notes - Note edited  10/15/2018  4:23 PM by Nuvia Pearce LSW    Goal Statement: I do not want to lose any more weight and will eat healthy.  CC to assist with support and resources.   Measure of Success: I want to weigh more than 105 pounds.   Supportive Steps to Achieve: I will eat healthy meals, low is sodium.    Barriers: Does not know why she keeps losing weight.   Strengths: Exercises and eats well. Cooks her meals.   Date to Achieve By: January 30, 2019.                 Patient/Caregiver understanding: Is eating snacks and drinking water. Thinks that avoiding things that she intolerant of will help her gain weight. Enjoys living in her building and having community there. Is using the [place] bus to get to stores and library on Wednesday.  Looking forward to walking outside when weather improves.  Is taking naps some days.      Plan: CC to call in one month to assess needs.     Nuvia Pearce,   [facility]  [email or web address]  [phone]

## 2019-03-05 ENCOUNTER — COMMUNICATION - HEALTHEAST (OUTPATIENT)
Dept: INTERNAL MEDICINE | Facility: CLINIC | Age: 84
End: 2019-03-05

## 2019-03-22 ENCOUNTER — AMBULATORY - HEALTHEAST (OUTPATIENT)
Dept: INTERNAL MEDICINE | Facility: CLINIC | Age: 84
End: 2019-03-22

## 2019-03-22 DIAGNOSIS — M85.80 OSTEOPENIA: ICD-10-CM

## 2019-03-27 ENCOUNTER — COMMUNICATION - HEALTHEAST (OUTPATIENT)
Dept: NURSING | Facility: CLINIC | Age: 84
End: 2019-03-27

## 2019-04-03 ENCOUNTER — AMBULATORY - HEALTHEAST (OUTPATIENT)
Dept: NURSING | Facility: CLINIC | Age: 84
End: 2019-04-03

## 2019-04-16 ENCOUNTER — OFFICE VISIT - HEALTHEAST (OUTPATIENT)
Dept: INTERNAL MEDICINE | Facility: CLINIC | Age: 84
End: 2019-04-16

## 2019-04-16 DIAGNOSIS — Z00.00 ROUTINE GENERAL MEDICAL EXAMINATION AT A HEALTH CARE FACILITY: ICD-10-CM

## 2019-04-16 LAB
ALBUMIN SERPL-MCNC: 3.5 G/DL (ref 3.5–5)
ALBUMIN UR-MCNC: ABNORMAL MG/DL
ALP SERPL-CCNC: 80 U/L (ref 45–120)
ALT SERPL W P-5'-P-CCNC: 19 U/L (ref 0–45)
ANION GAP SERPL CALCULATED.3IONS-SCNC: 9 MMOL/L (ref 5–18)
APPEARANCE UR: CLEAR
AST SERPL W P-5'-P-CCNC: 24 U/L (ref 0–40)
BACTERIA #/AREA URNS HPF: ABNORMAL HPF
BILIRUB SERPL-MCNC: 0.8 MG/DL (ref 0–1)
BILIRUB UR QL STRIP: NEGATIVE
BUN SERPL-MCNC: 18 MG/DL (ref 8–28)
CALCIUM SERPL-MCNC: 9.3 MG/DL (ref 8.5–10.5)
CHLORIDE BLD-SCNC: 107 MMOL/L (ref 98–107)
CHOLEST SERPL-MCNC: 231 MG/DL
CO2 SERPL-SCNC: 26 MMOL/L (ref 22–31)
COLOR UR AUTO: YELLOW
CREAT SERPL-MCNC: 0.84 MG/DL (ref 0.6–1.1)
ERYTHROCYTE [DISTWIDTH] IN BLOOD BY AUTOMATED COUNT: 12.5 % (ref 11–14.5)
FASTING STATUS PATIENT QL REPORTED: NO
GFR SERPL CREATININE-BSD FRML MDRD: >60 ML/MIN/1.73M2
GLUCOSE BLD-MCNC: 104 MG/DL (ref 70–125)
GLUCOSE UR STRIP-MCNC: NEGATIVE MG/DL
HCT VFR BLD AUTO: 40.4 % (ref 35–47)
HDLC SERPL-MCNC: 75 MG/DL
HGB BLD-MCNC: 13.4 G/DL (ref 12–16)
HGB UR QL STRIP: ABNORMAL
KETONES UR STRIP-MCNC: NEGATIVE MG/DL
LDLC SERPL CALC-MCNC: 139 MG/DL
LEUKOCYTE ESTERASE UR QL STRIP: NEGATIVE
MCH RBC QN AUTO: 27.5 PG (ref 27–34)
MCHC RBC AUTO-ENTMCNC: 33.2 G/DL (ref 32–36)
MCV RBC AUTO: 83 FL (ref 80–100)
MUCOUS THREADS #/AREA URNS LPF: ABNORMAL LPF
NITRATE UR QL: NEGATIVE
PH UR STRIP: 6 [PH] (ref 4.5–8)
PLATELET # BLD AUTO: 286 THOU/UL (ref 140–440)
PMV BLD AUTO: 7.8 FL (ref 7–10)
POTASSIUM BLD-SCNC: 4.1 MMOL/L (ref 3.5–5)
PROT SERPL-MCNC: 6.9 G/DL (ref 6–8)
RBC # BLD AUTO: 4.87 MILL/UL (ref 3.8–5.4)
RBC #/AREA URNS AUTO: ABNORMAL HPF
SODIUM SERPL-SCNC: 142 MMOL/L (ref 136–145)
SP GR UR STRIP: 1.02 (ref 1–1.03)
SQUAMOUS #/AREA URNS AUTO: ABNORMAL LPF
TRIGL SERPL-MCNC: 86 MG/DL
TSH SERPL DL<=0.005 MIU/L-ACNC: 1.64 UIU/ML (ref 0.3–5)
UROBILINOGEN UR STRIP-ACNC: ABNORMAL
WBC #/AREA URNS AUTO: ABNORMAL HPF
WBC: 4.5 THOU/UL (ref 4–11)

## 2019-04-16 ASSESSMENT — MIFFLIN-ST. JEOR: SCORE: 819.89

## 2019-04-17 ENCOUNTER — COMMUNICATION - HEALTHEAST (OUTPATIENT)
Dept: INTERNAL MEDICINE | Facility: CLINIC | Age: 84
End: 2019-04-17

## 2019-04-17 ENCOUNTER — AMBULATORY - HEALTHEAST (OUTPATIENT)
Dept: INTERNAL MEDICINE | Facility: CLINIC | Age: 84
End: 2019-04-17

## 2019-04-17 DIAGNOSIS — Z00.00 ROUTINE GENERAL MEDICAL EXAMINATION AT A HEALTH CARE FACILITY: ICD-10-CM

## 2019-04-17 DIAGNOSIS — R31.29 MICROSCOPIC HEMATURIA: ICD-10-CM

## 2019-04-19 ENCOUNTER — COMMUNICATION - HEALTHEAST (OUTPATIENT)
Dept: INTERNAL MEDICINE | Facility: CLINIC | Age: 84
End: 2019-04-19

## 2019-05-08 ENCOUNTER — RECORDS - HEALTHEAST (OUTPATIENT)
Dept: ADMINISTRATIVE | Facility: OTHER | Age: 84
End: 2019-05-08

## 2019-05-09 ENCOUNTER — RECORDS - HEALTHEAST (OUTPATIENT)
Dept: ADMINISTRATIVE | Facility: OTHER | Age: 84
End: 2019-05-09

## 2019-06-10 ENCOUNTER — COMMUNICATION - HEALTHEAST (OUTPATIENT)
Dept: INTERNAL MEDICINE | Facility: CLINIC | Age: 84
End: 2019-06-10

## 2019-06-30 ENCOUNTER — COMMUNICATION - HEALTHEAST (OUTPATIENT)
Dept: INTERNAL MEDICINE | Facility: CLINIC | Age: 84
End: 2019-06-30

## 2019-06-30 DIAGNOSIS — F41.9 ANXIETY: ICD-10-CM

## 2019-07-16 ENCOUNTER — OFFICE VISIT - HEALTHEAST (OUTPATIENT)
Dept: INTERNAL MEDICINE | Facility: CLINIC | Age: 84
End: 2019-07-16

## 2019-07-16 DIAGNOSIS — I10 ESSENTIAL HYPERTENSION: ICD-10-CM

## 2019-07-16 LAB
CHOLEST SERPL-MCNC: 157 MG/DL
FASTING STATUS PATIENT QL REPORTED: YES
HDLC SERPL-MCNC: 69 MG/DL
LDLC SERPL CALC-MCNC: 72 MG/DL
TRIGL SERPL-MCNC: 80 MG/DL

## 2019-07-16 ASSESSMENT — MIFFLIN-ST. JEOR: SCORE: 828.97

## 2019-07-17 ENCOUNTER — COMMUNICATION - HEALTHEAST (OUTPATIENT)
Dept: INTERNAL MEDICINE | Facility: CLINIC | Age: 84
End: 2019-07-17

## 2019-08-07 ENCOUNTER — COMMUNICATION - HEALTHEAST (OUTPATIENT)
Dept: INTERNAL MEDICINE | Facility: CLINIC | Age: 84
End: 2019-08-07

## 2019-08-07 DIAGNOSIS — I10 ESSENTIAL HYPERTENSION: ICD-10-CM

## 2019-10-24 ENCOUNTER — OFFICE VISIT - HEALTHEAST (OUTPATIENT)
Dept: INTERNAL MEDICINE | Facility: CLINIC | Age: 84
End: 2019-10-24

## 2019-10-24 DIAGNOSIS — I10 ESSENTIAL HYPERTENSION: ICD-10-CM

## 2019-10-24 LAB
CHOLEST SERPL-MCNC: 232 MG/DL
FASTING STATUS PATIENT QL REPORTED: YES
HDLC SERPL-MCNC: 67 MG/DL
LDLC SERPL CALC-MCNC: 142 MG/DL
TRIGL SERPL-MCNC: 113 MG/DL

## 2019-10-24 ASSESSMENT — MIFFLIN-ST. JEOR: SCORE: 806.29

## 2019-10-29 ENCOUNTER — COMMUNICATION - HEALTHEAST (OUTPATIENT)
Dept: INTERNAL MEDICINE | Facility: CLINIC | Age: 84
End: 2019-10-29

## 2019-10-29 DIAGNOSIS — E78.5 HYPERLIPIDEMIA: ICD-10-CM

## 2019-11-01 ENCOUNTER — COMMUNICATION - HEALTHEAST (OUTPATIENT)
Dept: INTERNAL MEDICINE | Facility: CLINIC | Age: 84
End: 2019-11-01

## 2019-11-03 ENCOUNTER — COMMUNICATION - HEALTHEAST (OUTPATIENT)
Dept: SCHEDULING | Facility: CLINIC | Age: 84
End: 2019-11-03

## 2020-01-28 ENCOUNTER — OFFICE VISIT - HEALTHEAST (OUTPATIENT)
Dept: INTERNAL MEDICINE | Facility: CLINIC | Age: 85
End: 2020-01-28

## 2020-01-28 DIAGNOSIS — I10 ESSENTIAL HYPERTENSION: ICD-10-CM

## 2020-01-28 LAB
CHOLEST SERPL-MCNC: 158 MG/DL
FASTING STATUS PATIENT QL REPORTED: YES
HDLC SERPL-MCNC: 69 MG/DL
LDLC SERPL CALC-MCNC: 70 MG/DL
TRIGL SERPL-MCNC: 96 MG/DL

## 2020-01-28 ASSESSMENT — MIFFLIN-ST. JEOR: SCORE: 810.82

## 2020-01-29 ENCOUNTER — COMMUNICATION - HEALTHEAST (OUTPATIENT)
Dept: INTERNAL MEDICINE | Facility: CLINIC | Age: 85
End: 2020-01-29

## 2020-03-06 ENCOUNTER — COMMUNICATION - HEALTHEAST (OUTPATIENT)
Dept: INTERNAL MEDICINE | Facility: CLINIC | Age: 85
End: 2020-03-06

## 2020-03-06 DIAGNOSIS — R12 HEARTBURN: ICD-10-CM

## 2020-06-29 ENCOUNTER — OFFICE VISIT - HEALTHEAST (OUTPATIENT)
Dept: INTERNAL MEDICINE | Facility: CLINIC | Age: 85
End: 2020-06-29

## 2020-06-29 DIAGNOSIS — I10 ESSENTIAL HYPERTENSION: ICD-10-CM

## 2020-06-29 LAB
CHOLEST SERPL-MCNC: 153 MG/DL
FASTING STATUS PATIENT QL REPORTED: YES
HDLC SERPL-MCNC: 64 MG/DL
LDLC SERPL CALC-MCNC: 72 MG/DL
TRIGL SERPL-MCNC: 87 MG/DL

## 2020-06-29 ASSESSMENT — MIFFLIN-ST. JEOR: SCORE: 824.43

## 2020-06-30 ENCOUNTER — COMMUNICATION - HEALTHEAST (OUTPATIENT)
Dept: INTERNAL MEDICINE | Facility: CLINIC | Age: 85
End: 2020-06-30

## 2020-07-01 ENCOUNTER — COMMUNICATION - HEALTHEAST (OUTPATIENT)
Dept: INTERNAL MEDICINE | Facility: CLINIC | Age: 85
End: 2020-07-01

## 2020-07-01 DIAGNOSIS — F41.9 ANXIETY: ICD-10-CM

## 2020-07-22 ENCOUNTER — COMMUNICATION - HEALTHEAST (OUTPATIENT)
Dept: INTERNAL MEDICINE | Facility: CLINIC | Age: 85
End: 2020-07-22

## 2020-07-22 DIAGNOSIS — I10 ESSENTIAL HYPERTENSION: ICD-10-CM

## 2020-08-11 ENCOUNTER — COMMUNICATION - HEALTHEAST (OUTPATIENT)
Dept: SCHEDULING | Facility: CLINIC | Age: 85
End: 2020-08-11

## 2020-09-04 ENCOUNTER — COMMUNICATION - HEALTHEAST (OUTPATIENT)
Dept: INTERNAL MEDICINE | Facility: CLINIC | Age: 85
End: 2020-09-04

## 2020-09-09 ENCOUNTER — COMMUNICATION - HEALTHEAST (OUTPATIENT)
Dept: INTERNAL MEDICINE | Facility: CLINIC | Age: 85
End: 2020-09-09

## 2020-09-18 ENCOUNTER — OFFICE VISIT - HEALTHEAST (OUTPATIENT)
Dept: INTERNAL MEDICINE | Facility: CLINIC | Age: 85
End: 2020-09-18

## 2020-09-18 DIAGNOSIS — I10 ESSENTIAL HYPERTENSION: ICD-10-CM

## 2021-02-18 ENCOUNTER — OFFICE VISIT - HEALTHEAST (OUTPATIENT)
Dept: INTERNAL MEDICINE | Facility: CLINIC | Age: 86
End: 2021-02-18

## 2021-02-18 ENCOUNTER — COMMUNICATION - HEALTHEAST (OUTPATIENT)
Dept: ADMINISTRATIVE | Facility: CLINIC | Age: 86
End: 2021-02-18

## 2021-02-18 ENCOUNTER — AMBULATORY - HEALTHEAST (OUTPATIENT)
Dept: INTERNAL MEDICINE | Facility: CLINIC | Age: 86
End: 2021-02-18

## 2021-02-18 DIAGNOSIS — I10 ESSENTIAL HYPERTENSION: ICD-10-CM

## 2021-02-18 DIAGNOSIS — F03.90 DEMENTIA WITHOUT BEHAVIORAL DISTURBANCE, UNSPECIFIED DEMENTIA TYPE: ICD-10-CM

## 2021-02-18 ASSESSMENT — MIFFLIN-ST. JEOR: SCORE: 792.68

## 2021-02-21 ENCOUNTER — COMMUNICATION - HEALTHEAST (OUTPATIENT)
Dept: SCHEDULING | Facility: CLINIC | Age: 86
End: 2021-02-21

## 2021-03-04 ENCOUNTER — COMMUNICATION - HEALTHEAST (OUTPATIENT)
Dept: INTERNAL MEDICINE | Facility: CLINIC | Age: 86
End: 2021-03-04

## 2021-03-11 ENCOUNTER — COMMUNICATION - HEALTHEAST (OUTPATIENT)
Dept: SCHEDULING | Facility: CLINIC | Age: 86
End: 2021-03-11

## 2021-03-13 ENCOUNTER — COMMUNICATION - HEALTHEAST (OUTPATIENT)
Dept: INTERNAL MEDICINE | Facility: CLINIC | Age: 86
End: 2021-03-13

## 2021-03-13 DIAGNOSIS — R12 HEARTBURN: ICD-10-CM

## 2021-03-16 ENCOUNTER — AMBULATORY - HEALTHEAST (OUTPATIENT)
Dept: NEUROLOGY | Facility: CLINIC | Age: 86
End: 2021-03-16

## 2021-03-16 ENCOUNTER — OFFICE VISIT (OUTPATIENT)
Dept: NEUROLOGY | Facility: CLINIC | Age: 86
End: 2021-03-16
Payer: COMMERCIAL

## 2021-03-16 ENCOUNTER — RECORDS - HEALTHEAST (OUTPATIENT)
Dept: ADMINISTRATIVE | Facility: OTHER | Age: 86
End: 2021-03-16

## 2021-03-16 VITALS
BODY MASS INDEX: 20.36 KG/M2 | SYSTOLIC BLOOD PRESSURE: 138 MMHG | HEIGHT: 58 IN | WEIGHT: 97 LBS | DIASTOLIC BLOOD PRESSURE: 83 MMHG | HEART RATE: 75 BPM

## 2021-03-16 DIAGNOSIS — R41.9 NEUROCOGNITIVE DISORDER: ICD-10-CM

## 2021-03-16 DIAGNOSIS — R41.9 NEUROCOGNITIVE DISORDER: Primary | ICD-10-CM

## 2021-03-16 PROBLEM — M89.9 DISORDER OF BONE AND CARTILAGE: Status: ACTIVE | Noted: 2021-03-16

## 2021-03-16 PROBLEM — I10 HYPERTENSION: Status: ACTIVE | Noted: 2021-03-16

## 2021-03-16 PROBLEM — M89.9 DISORDER OF BONE AND CARTILAGE: Status: RESOLVED | Noted: 2021-03-16 | Resolved: 2021-03-16

## 2021-03-16 PROBLEM — M94.9 DISORDER OF BONE AND CARTILAGE: Status: RESOLVED | Noted: 2021-03-16 | Resolved: 2021-03-16

## 2021-03-16 PROBLEM — M47.812 CERVICAL SPONDYLOSIS: Status: ACTIVE | Noted: 2021-03-16

## 2021-03-16 PROBLEM — M94.9 DISORDER OF BONE AND CARTILAGE: Status: ACTIVE | Noted: 2021-03-16

## 2021-03-16 PROCEDURE — 99205 OFFICE O/P NEW HI 60 MIN: CPT | Performed by: PSYCHIATRY & NEUROLOGY

## 2021-03-16 RX ORDER — FLUTICASONE PROPIONATE 50 MCG
2 SPRAY, SUSPENSION (ML) NASAL
COMMUNITY
End: 2024-01-01

## 2021-03-16 RX ORDER — CITALOPRAM HYDROBROMIDE 10 MG/1
TABLET ORAL
COMMUNITY
Start: 2020-07-02 | End: 2024-01-01

## 2021-03-16 RX ORDER — AMLODIPINE BESYLATE 2.5 MG/1
2.5 TABLET ORAL
COMMUNITY
Start: 2020-07-22 | End: 2021-07-25

## 2021-03-16 SDOH — HEALTH STABILITY: MENTAL HEALTH: HOW OFTEN DO YOU HAVE A DRINK CONTAINING ALCOHOL?: NOT ASKED

## 2021-03-16 SDOH — HEALTH STABILITY: MENTAL HEALTH: HOW MANY STANDARD DRINKS CONTAINING ALCOHOL DO YOU HAVE ON A TYPICAL DAY?: NOT ASKED

## 2021-03-16 SDOH — HEALTH STABILITY: MENTAL HEALTH: HOW OFTEN DO YOU HAVE 6 OR MORE DRINKS ON ONE OCCASION?: NOT ASKED

## 2021-03-16 ASSESSMENT — MONTREAL COGNITIVE ASSESSMENT (MOCA)
VISUOSPATIAL/EXECUTIVE SUBSCORE: 1
WHAT LEVEL OF EDUCATION WAS ATTAINED: 0
9. REPEAT EACH SENTENCE: 2
6. READ LIST OF DIGITS [FORWARD/BACKWARD]: 2
7. [VIGILENCE] TAP WHEN HEARING DESIGNATED LETTER: 0
8. SERIAL SUBTRACTION OF 7S: 0
10. [FLUENCY] NAME WORDS STARTING WITH DESIGNATED LETTER: 0
13. ORIENTATION SUBSCORE: 1
4. NAME EACH OF THE THREE ANIMALS SHOWN: 1
12. MEMORY INDEX SCORE: 0
WHAT IS THE TOTAL SCORE (OUT OF 30): 7
11. FOR EACH PAIR OF WORDS, WHAT CATEGORY DO THEY BELONG TO (OUT OF 2): 0

## 2021-03-16 ASSESSMENT — MIFFLIN-ST. JEOR: SCORE: 784.74
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
## 2021-03-16 NOTE — NURSING NOTE
[place] COGNITIVE ASSESSMENT (MOCA)  Version 7.1 Original Version  VISUOSPATIAL/EXECUTIVE               COPY CUBE      [   0 ]                                [ 0   ] DRAW CLOCK (Ten past eleven)  (3 points)    [1    ]                    [ 0   ]               [  0  ]       Contour            Numbers     Hands POINTS                1   / 5   NAMING    [  1 ]                                                                        [  0  ]                                             [ 1   ]  Lirodriguez Callahan                                Camel                  2   / 3   MEMORY Read list of words, subject must repeat them. Do 2 trials, even if 1st trial is successful. Do a recall after 5 minutes  FACE VELVET Mandaeism BRIGETTE RED No Points    1st          2nd         ATTENTION Read list of digits (1 digit/sec) Subject has to repeat in the forward order       [ 1   ]   2  1  8  5  4                                [1    ] 7 4 2                       2   /2   Read list of letters. The subject must tap with his hand at each letter A. No points if > 2 errors.  [    ] F B A C M N A A J K L B A F A K D E A A A J A M O F A A B           0   /1   Serial 7 subtraction starting at 100          [    ] 93         [    ] 86          [    ] 79          [    ] 72         [    ] 65   4 or 5 correct subtractions: 3 points,  2 or 3 correct: 2 points,  1correct: 1 point,   0 correct: 0 points           0 /3   LANGUAGE Repeat: I only know that Po is the one to help today. [  1   ]                                      The cat always hid under the couch when dogs were in the room. [ 1  ]            2   /2   Fluency: Name maximum number of words in one minute that begin with the letter F                                                                                                                    [ 0   ] __3_ (N > 11 words)           0    /1   ABSTRACTION Similarity between  e.g. banana-orange=fruit                                                                   [ 0   ] train-bicycle                      [  0  ] watch-ruler           0   /2   DELAYED  RECALL Has to recall words  WITH NO CUE FACE  [    ] VELVET  [    ] Confucianist  [    ]  BRIGETTE  [    ] RED  [    ] Points for UNCUED recall only         0   /5           OPTIONAL Category cue           Multiple choice cue          ORIENTATION  [  0  ] Date     [ 1   ] Month       [ 0   ] Year      [  0  ] Day      [ 0  ] Place        [0    ] City      1 /6   TOTAL  Normal > 26/30 Add 1 point if < 12 years education    7 /30

## 2021-03-16 NOTE — NURSING NOTE
Chief Complaint   Patient presents with     Memory Loss     Zari Hadley CMA on 3/16/2021 at 11:14 AM

## 2021-03-17 ENCOUNTER — AMBULATORY - HEALTHEAST (OUTPATIENT)
Dept: LAB | Facility: HOSPITAL | Age: 86
End: 2021-03-17

## 2021-03-17 ENCOUNTER — COMMUNICATION - HEALTHEAST (OUTPATIENT)
Dept: ADMINISTRATIVE | Facility: CLINIC | Age: 86
End: 2021-03-17

## 2021-03-17 DIAGNOSIS — F99: ICD-10-CM

## 2021-03-17 LAB
FOLATE SERPL-MCNC: 14.5 NG/ML
T PALLIDUM AB SER QL: NEGATIVE
TSH SERPL DL<=0.005 MIU/L-ACNC: 2.18 UIU/ML (ref 0.3–5)
VIT B12 SERPL-MCNC: 927 PG/ML (ref 213–816)

## 2021-03-18 LAB
FASTING STATUS PATIENT QL REPORTED: YES
HOMOCYSTEINE PLASMA - HISTORICAL: 8 UMOL/L (ref 0–13)

## 2021-03-20 LAB — VIT B1 PYROPHOSHATE BLD-SCNC: 76 NMOL/L (ref 70–180)

## 2021-03-23 ENCOUNTER — HOSPITAL ENCOUNTER (OUTPATIENT)
Dept: MRI IMAGING | Facility: HOSPITAL | Age: 86
Discharge: HOME | End: 2021-03-23
Attending: PSYCHIATRY & NEUROLOGY

## 2021-03-23 DIAGNOSIS — R41.9 NEUROCOGNITIVE DISORDER: ICD-10-CM

## 2021-03-24 LAB — METHYLMALONATE SERPL-SCNC: 0.12 UMOL/L (ref 0–0.4)

## 2021-03-29 ENCOUNTER — COMMUNICATION - HEALTHEAST (OUTPATIENT)
Dept: INTERNAL MEDICINE | Facility: CLINIC | Age: 86
End: 2021-03-29

## 2021-03-30 ENCOUNTER — OFFICE VISIT - HEALTHEAST (OUTPATIENT)
Dept: INTERNAL MEDICINE | Facility: CLINIC | Age: 86
End: 2021-03-30

## 2021-03-30 ENCOUNTER — COMMUNICATION - HEALTHEAST (OUTPATIENT)
Dept: ADMINISTRATIVE | Facility: CLINIC | Age: 86
End: 2021-03-30

## 2021-03-30 DIAGNOSIS — I10 ESSENTIAL HYPERTENSION: ICD-10-CM

## 2021-04-09 ENCOUNTER — COMMUNICATION - HEALTHEAST (OUTPATIENT)
Dept: ADMINISTRATIVE | Facility: CLINIC | Age: 86
End: 2021-04-09

## 2021-04-13 ENCOUNTER — RECORDS - HEALTHEAST (OUTPATIENT)
Dept: ADMINISTRATIVE | Facility: OTHER | Age: 86
End: 2021-04-13

## 2021-04-30 ENCOUNTER — OFFICE VISIT - HEALTHEAST (OUTPATIENT)
Dept: INTERNAL MEDICINE | Facility: CLINIC | Age: 86
End: 2021-04-30

## 2021-04-30 DIAGNOSIS — F03.90 DEMENTIA WITHOUT BEHAVIORAL DISTURBANCE, UNSPECIFIED DEMENTIA TYPE: ICD-10-CM

## 2021-04-30 ASSESSMENT — MIFFLIN-ST. JEOR: SCORE: 792.68

## 2021-05-23 ENCOUNTER — COMMUNICATION - HEALTHEAST (OUTPATIENT)
Dept: INTERNAL MEDICINE | Facility: CLINIC | Age: 86
End: 2021-05-23

## 2021-05-23 DIAGNOSIS — F03.90 DEMENTIA WITHOUT BEHAVIORAL DISTURBANCE, UNSPECIFIED DEMENTIA TYPE: ICD-10-CM

## 2021-05-25 ENCOUNTER — RECORDS - HEALTHEAST (OUTPATIENT)
Dept: ADMINISTRATIVE | Facility: CLINIC | Age: 86
End: 2021-05-25

## 2021-05-26 ENCOUNTER — RECORDS - HEALTHEAST (OUTPATIENT)
Dept: ADMINISTRATIVE | Facility: CLINIC | Age: 86
End: 2021-05-26

## 2021-05-27 NOTE — PROGRESS NOTES
"Writer spoke with patient today via the phone for RN Assessment and possible enrollment in [facility].   Patient reported she lives in a two-bedroom apartment in [place] by herself. She said there is an elevator in her building, but said she is able to climb stairs with out difficulty. Patient denied any safety concerns within her apartment and denied any need for any DME at this time. Patient said she is able to ambulate unassisted. She said she is able to complete all her cares independently and is able to maintain her apartment by herself. She denied any need for PCA/Homemaker assistance.   Patient has a car and is able to drive herself to the grocery store, pharmacy, medical appointments and all other errands. She said her apartment building additionally has a shuttle-type transportation system she referred to as \"the [facility]\" available to her.   Patient stated she manages her own medications and denied any need for assistance at this time. She reported she takes all her medications as prescribed.  Patient said she had a concern about losing weight in the past, but said she feels this has resolved. She stated she started eating out more with her friends and feels that this has helped her maintain her weight.  Patient reported having a supportive daughter and son, and multiple friends. She reported she is actively involved in the community and her Scientologist.   Patient reported feeling down after finding out her grandson was diagnosed with schizophrenia, but denied any need to see a therapist. She said she gets the support she needs from her family, friends and through prayer. She denied any suicidal concerns   Patient denied any financial concerns at this time.   Patient declined enrollment in [facility] at this time, but said she would let her PCP know if she needed assistance in the future.   "

## 2021-05-27 NOTE — PROGRESS NOTES
Kay GUILLEN, Clinic Care Coordination (CCC) Care Guide (CG), spoke with kit for CCC referral, covering for Helen BUSTAMANTE CG. CG noted per chart review that patient had [place] care coordinator Nuvia Pearce,  [facility] and that Nuvia was leaving her position and patient wanted to transfer care. CG spoke with patient who was open to CCC and scheduled 04/03/19 10am phone PCAM.    Next outreach: 04/08/19    Plan:  -Goal setting

## 2021-05-27 NOTE — PROGRESS NOTES
I am comfortable withI am comfortable withAssessment and Plan:   Annual wellness visit and physical exam.  Memory issues post a problem during this examination and her cognitive decline is quite evident.    The patient could not complete many of the tasks required part of the annual wellness exam.    Colonoscopy dated August 30, 2013 was normal we think her mammogram was last done on September 16, 2014 ALT normal patient is not interested in having a Pap pelvic or rectal exam she did want a breast check as she has a mole on her left breast.    1. [facility]  Annual wellness visit and physical exam.  - HM2(CBC w/o Differential)  - Comprehensive Metabolic Panel  - Lipid Cascade  - Thyroid Stimulating Hormone (TSH)  - Urinalysis-UC if Indicated     The patient's current medical problems were reviewed.    I have had an Advance Directives discussion with the patient.  The following health maintenance schedule was reviewed with the patient and provided in printed form in the after visit summary:   Health Maintenance   Topic Date Due     DXA SCAN  07/17/2002     PNEUMOCOCCAL CONJUGATE VACCINE FOR ADULTS (PCV13 OR PREVNAR)  07/17/2002     INFLUENZA VACCINE RULE BASED (1) 08/01/2018     TD 18+ HE  01/30/2019     FALL RISK ASSESSMENT  01/02/2020     ADVANCE DIRECTIVES DISCUSSED WITH PATIENT  05/19/2020     PNEUMOCOCCAL POLYSACCHARIDE VACCINE AGE 65 AND OVER  Completed        Subjective:   Chief Complaint: Helen Napoles is an 83 y.o. female here for an Annual Wellness visit.   HPI: Annual wellness and physical exam.  For this 83-year-old female.    Cognitive decline interfered with the history taking the patient could not form a clock when asked to and she was not able to answer any other questions about remembering 3 thanks.    Non-smoker no alcohol excess allergy to hydroxyzine and pentazocine.    Immunizations are reviewed and up-to-date prior history of hypertension  with hyperlipidemia.    Ovaries removed  for unspecified reason no children the patient's mother  age 60 of lung cancer she had been a heavy smoker.    Father  of a stroke age 80.    Her   in  of a stroke he also had a myocardial infarction he was 78 years of age at the time of his death.    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    Prior history of cataract surgery by Dr. Coreas.  Successful.    Review of Systems:    Please see above.  The rest of the review of systems are negative for all systems.    Patient Care Team:  Denver Rain MD as PCP - General  [facility]      Patient Active Problem List   Diagnosis     Osteoarthritis Of The Ankle/Foot (Multiple Joints)     Palpitations     Osteopenia     Anemia     Fatigue     Cataract     Hyperlipidemia     Hypertension     Heartburn     Osteoarthritis Of The Knee     Cervical Spondylosis     Cervicalgia     Limb Pain     Headache     Chest Pain     Anxiety     TIA (transient ischemic attack)     Hyperlipidemia     Past Medical History:   Diagnosis Date     Anxiety      Benign essential HTN      Cataract      Cerebral vascular accident (H)     tia     Cervical spondylosis      Cervicalgia      Chest pain      Family history of myocardial infarction     Mom 58     Fatigue      Headache      High cholesterol      HLD (hyperlipidemia)      Limb pain      Osteoarthritis      Osteoarthritis      Osteopenia      Palpitations       Past Surgical History:   Procedure Laterality Date     OOPHORECTOMY      one ovary     ND TOTAL ABDOM HYSTERECTOMY      Description: Hysterectomy;  Recorded: 2007;      Family History   Problem Relation Age of Onset     Cancer Mother 60        Lung     Heart disease Mother      Ovarian cancer Maternal Aunt 22     Stroke Father       Social History     Socioeconomic History     Marital status:       Spouse name: Not on file     Number of children: 0     Years of education: Not on file     Highest education level: Not on file   Occupational History     Not on file   Social Needs     Financial resource strain: Not on file     Food insecurity:     Worry: Not on file     Inability: Not on file     Transportation needs:     Medical: Not on file     Non-medical: Not on file   Tobacco Use     Smoking status: Never Smoker     Smokeless tobacco: Never Used   Substance and Sexual Activity     Alcohol use: No     Drug use: No     Sexual activity: Not on file   Lifestyle     Physical activity:     Days per week: Not on file     Minutes per session: Not on file     Stress: Not on file   Relationships     Social connections:     Talks on phone: Not on file     Gets together: Not on file     Attends Mandaeism service: Not on file     Active member of club or organization: Not on file     Attends meetings of clubs or organizations: Not on file     Relationship status: Not on file     Intimate partner violence:     Fear of current or ex partner: Not on file     Emotionally abused: Not on file     Physically abused: Not on file     Forced sexual activity: Not on file   Other Topics Concern     Not on file   Social History Narrative    , no children.       Current Outpatient Medications   Medication Sig Dispense Refill     amLODIPine (NORVASC) 2.5 MG tablet Take 1 tablet (2.5 mg total) by mouth daily. 90 tablet 1     aspirin 81 MG EC tablet Take 81 mg by mouth daily.        citalopram (CELEXA) 10 MG tablet Take 1 tablet (10 mg total) by mouth daily. 90 tablet 1     omeprazole (PRILOSEC) 20 MG capsule TAKE 1 CAPSULE (20 MG TOTAL) BY MOUTH DAILY. 30 capsule 11     betamethasone, augmented, (DIPROLENE-AF) 0.05 % cream APPLY SPARINGLY TO RASH TWICE DAILY 30 g 0     fluticasone (FLONASE) 50 mcg/actuation nasal spray Apply 2 sprays into each nostril daily.       No current facility-administered medications for this visit.      "  Objective:   Vital Signs:   Visit Vitals  /70 (Patient Site: Right Arm, Patient Position: Sitting)   Pulse 67   Ht 4' 10.5\" (1.486 m)   Wt 103 lb (46.7 kg)   LMP 09/16/1987   SpO2 98%   BMI 21.16 kg/m         VisionScreening:  No exam data present     PHYSICAL EXAM  Chest clear to auscultation and percussion.  Heart tones regular rhythm without murmur rub or gallop.  Abdomen soft nontender no organomegaly.  No peritoneal signs.  Extremities free of edema cyanosis or clubbing.  Neck veins nondistended no thyromegaly or scleral icterus noted, carotids full.  Skin warm and dry easily conversant good spirited.  Normal intelligence.  Neurologically intact no gross localizing findings.  Skin negative lymph negative neuro negative psych normal HEENT negative wears glasses seborrheic keratosis left breast superficial no masses palpable patient is not interested in a Pap pelvic or rectal exam and she did not wish to have a colonoscopy or mammographic testing good pulse noted in all 4 extremities back straight no severe spine tenderness no carotid bruits.  No lymphadenopathy appreciated lymph bearing areas.    Assessment Results 4/16/2019   Activities of Daily Living No help needed   Instrumental Activities of Daily Living 1 - Full function   Get Up and Go Score Less than 12 seconds   Mini Cog Total Score 0   Some recent data might be hidden     A Mini-Cog score of 0-2 suggests the possibility of dementia, score of 3-5 suggests no dementia    Identified Health Risks:     The patient reports that she has difficulty with instrumental activities of daily living.  She was provided with a list of local organizations that provide support services and advised to make a follow up appointment in 4 weeks to address this further.   The patient was provided with written information regarding signs of hearing loss.  The patient was provided with suggestions to help her develop a healthy emotional lifestyle.   Patient's advanced " directive was discussed and  the patient's wishes.        The patient was provided with appropriate referrals to address her memory problem.

## 2021-05-28 ENCOUNTER — AMBULATORY - HEALTHEAST (OUTPATIENT)
Dept: INTERNAL MEDICINE | Facility: CLINIC | Age: 86
End: 2021-05-28

## 2021-05-28 ENCOUNTER — COMMUNICATION - HEALTHEAST (OUTPATIENT)
Dept: INTERNAL MEDICINE | Facility: CLINIC | Age: 86
End: 2021-05-28

## 2021-05-28 ENCOUNTER — OFFICE VISIT - HEALTHEAST (OUTPATIENT)
Dept: INTERNAL MEDICINE | Facility: CLINIC | Age: 86
End: 2021-05-28

## 2021-05-28 DIAGNOSIS — R31.29 MICROSCOPIC HEMATURIA: ICD-10-CM

## 2021-05-28 DIAGNOSIS — I10 ESSENTIAL HYPERTENSION: ICD-10-CM

## 2021-05-28 DIAGNOSIS — F03.90 DEMENTIA WITHOUT BEHAVIORAL DISTURBANCE, UNSPECIFIED DEMENTIA TYPE: ICD-10-CM

## 2021-05-28 LAB
ALBUMIN SERPL-MCNC: 4 G/DL (ref 3.5–5)
ALBUMIN UR-MCNC: ABNORMAL G/DL
ALP SERPL-CCNC: 88 U/L (ref 45–120)
ALT SERPL W P-5'-P-CCNC: 29 U/L (ref 0–45)
ANION GAP SERPL CALCULATED.3IONS-SCNC: 14 MMOL/L (ref 5–18)
APPEARANCE UR: CLEAR
AST SERPL W P-5'-P-CCNC: 26 U/L (ref 0–40)
BACTERIA #/AREA URNS HPF: ABNORMAL /[HPF]
BILIRUB SERPL-MCNC: 1.1 MG/DL (ref 0–1)
BILIRUB UR QL STRIP: NEGATIVE
BUN SERPL-MCNC: 10 MG/DL (ref 8–28)
CALCIUM SERPL-MCNC: 9.3 MG/DL (ref 8.5–10.5)
CHLORIDE BLD-SCNC: 103 MMOL/L (ref 98–107)
CHOLEST SERPL-MCNC: 225 MG/DL
CO2 SERPL-SCNC: 25 MMOL/L (ref 22–31)
COLOR UR AUTO: YELLOW
CREAT SERPL-MCNC: 0.88 MG/DL (ref 0.6–1.1)
ERYTHROCYTE [DISTWIDTH] IN BLOOD BY AUTOMATED COUNT: 12.9 % (ref 11–14.5)
FASTING STATUS PATIENT QL REPORTED: YES
GFR SERPL CREATININE-BSD FRML MDRD: >60 ML/MIN/1.73M2
GLUCOSE BLD-MCNC: 114 MG/DL (ref 70–125)
GLUCOSE UR STRIP-MCNC: NEGATIVE MG/DL
HCT VFR BLD AUTO: 44.3 % (ref 35–47)
HDLC SERPL-MCNC: 74 MG/DL
HGB BLD-MCNC: 14 G/DL (ref 12–16)
HGB UR QL STRIP: ABNORMAL
KETONES UR STRIP-MCNC: NEGATIVE MG/DL
LDLC SERPL CALC-MCNC: 125 MG/DL
LEUKOCYTE ESTERASE UR QL STRIP: NEGATIVE
MCH RBC QN AUTO: 27 PG (ref 27–34)
MCHC RBC AUTO-ENTMCNC: 31.6 G/DL (ref 32–36)
MCV RBC AUTO: 86 FL (ref 80–100)
NITRATE UR QL: NEGATIVE
PH UR STRIP: 7 [PH] (ref 5–8)
PLATELET # BLD AUTO: 291 THOU/UL (ref 140–440)
PMV BLD AUTO: 9 FL (ref 7–10)
POTASSIUM BLD-SCNC: 4.2 MMOL/L (ref 3.5–5)
PROT SERPL-MCNC: 7.3 G/DL (ref 6–8)
RBC # BLD AUTO: 5.18 MILL/UL (ref 3.8–5.4)
RBC #/AREA URNS AUTO: ABNORMAL HPF
SODIUM SERPL-SCNC: 142 MMOL/L (ref 136–145)
SP GR UR STRIP: 1.02 (ref 1–1.03)
SQUAMOUS #/AREA URNS AUTO: ABNORMAL LPF
TRIGL SERPL-MCNC: 132 MG/DL
TSH SERPL DL<=0.005 MIU/L-ACNC: 1.99 UIU/ML (ref 0.3–5)
UROBILINOGEN UR STRIP-ACNC: ABNORMAL
VIT B12 SERPL-MCNC: 901 PG/ML (ref 213–816)
WBC #/AREA URNS AUTO: ABNORMAL HPF
WBC: 5 THOU/UL (ref 4–11)

## 2021-05-28 ASSESSMENT — MIFFLIN-ST. JEOR: SCORE: 779.98

## 2021-05-28 NOTE — TELEPHONE ENCOUNTER
Patient Returning Call  Reason for call:  The patient returning call.   Information relayed to patient:  Below message relayed to patient.     Notes recorded by Nicci Snyder CMA on 4/17/2019 at 4:17 PM CDT  LMOM with results and instructions to see [facility].  Copy of labs sent to pt    ------    Notes recorded by Denver Rain MD on 4/17/2019 at 7:21 AM CDT  Out hyperlipidemia as noted and would suggest atorvastatin 20 mg tablets number 90 tablets take 1 tablet daily with 5 refills.    Also microscopic hematuria is noted and would recommend a formal [facility] or [facility] consult at [phone] for evaluation of microscopic hematuria.    All other labs are quite good.  Please call patient and I will send an order for urology consult.    Patient has additional questions:  No  If YES, what are your questions/concerns:  NA  Okay to leave a detailed message?: No

## 2021-05-28 NOTE — TELEPHONE ENCOUNTER
Left message to call back for: Helen  Information to relay to patient:  Does pt want to start the new medication Simvastatin for cholesterol?

## 2021-05-29 ENCOUNTER — RECORDS - HEALTHEAST (OUTPATIENT)
Dept: ADMINISTRATIVE | Facility: CLINIC | Age: 86
End: 2021-05-29

## 2021-05-29 NOTE — TELEPHONE ENCOUNTER
Dr. Rain,  The result letter has been placed on your desk for you to review.  Deyanira ROJO, YANI/CMT....................4:27 PM

## 2021-05-29 NOTE — TELEPHONE ENCOUNTER
Please make a copy of any and all lab test done by [place] or [facility] urology under the direction of Dr. Watts please pick the copies and placed them on my desk with the patient's phone number to phone her back tomorrow.

## 2021-05-29 NOTE — TELEPHONE ENCOUNTER
Dr. Rain,  Spoke with Toshia at [facility], who states that Dr. Stringer, has not reviewed the patient's results, or called the patient as of yet.  Toshia will be faxing the results of the patient's testing for you to review and has sent a message to Dr. Stringer and his assistant to review results and contact the patient.    Spoke with the patient and relayed message above.  She verbalized understanding and had no further questions at this time.  Deyanira ROJO CMA/MAHNAZ....................3:45 PM

## 2021-05-29 NOTE — TELEPHONE ENCOUNTER
Test Results  Who is calling?:  Patient   Who ordered the test:  PCP  Type of test: Other: urology results - [facility]   Date of test:  End of may 2019 ( Approx. 2 weeks ago )   Where was the test performed: [facility]   What are your questions/concerns?: Patient would like the results of her urology testing, Patient states she has been trying to get results for 2 weeks? Can PCP request and update patient STAT.   Okay to leave a detailed message?:

## 2021-05-30 VITALS — BODY MASS INDEX: 22.38 KG/M2 | WEIGHT: 111 LBS | HEIGHT: 59 IN

## 2021-05-30 VITALS — BODY MASS INDEX: 22.38 KG/M2 | HEIGHT: 59 IN | WEIGHT: 111 LBS

## 2021-05-30 VITALS — HEIGHT: 59 IN | BODY MASS INDEX: 21.97 KG/M2 | WEIGHT: 109 LBS

## 2021-05-30 NOTE — PROGRESS NOTES
Office Visit - Follow up    Helen Napoles   83 y.o. female    Date of Visit: 7/16/2019    Chief Complaint   Patient presents with     Hypertension       Subjective: HTN    Follow-up blood in the urine has been seen by Dr. Watts from [facility].  Ultrasound of the kidney all normal on May 9, 2019 no masses.    No blood in stool or urine medication list reviewed well-tolerated normal effects.  An office cystoscopy was performed by Dr. Watts at [facility] this was negative.  Normal ultrasound of kidneys was also done as noted earlier May 9, 2019 for microscopic blood in the urine.  The patient denies any chest pain or shortness of breath medication list reviewed well-tolerated normal effects past health history includes that of hypertension with hyperlipidemia and anxiety.  No suicidal ideations and not depressed.    Multiple drug allergies noted including hydroxyzine and pentazocine.        ROS: A comprehensive review of systems was performed and was otherwise negative    Medications:  Prior to Admission medications    Medication Sig Start Date End Date Taking? Authorizing Provider   amLODIPine (NORVASC) 2.5 MG tablet Take 1 tablet (2.5 mg total) by mouth daily. 1/2/19  Yes Denver Rain MD   aspirin 81 MG EC tablet Take 81 mg by mouth daily.    Yes PROVIDER, HISTORICAL   atorvastatin (LIPITOR) 20 MG tablet Take 1 tablet (20 mg total) by mouth daily. 4/18/19  Yes Denver Rain MD   betamethasone, augmented, (DIPROLENE-AF) 0.05 % cream APPLY SPARINGLY TO RASH TWICE DAILY 3/5/19  Yes Shon Anderson MD   citalopram (CELEXA) 10 MG tablet TAKE 1 TABLET BY MOUTH EVERY DAY 6/30/19  Yes Denver Rain MD   omeprazole (PRILOSEC) 20 MG capsule TAKE 1 CAPSULE (20 MG TOTAL) BY MOUTH DAILY. 2/20/19  Yes Denver Rain MD   fluticasone (FLONASE) 50 mcg/actuation nasal spray Apply 2 sprays into each nostril daily.    PROVIDER, HISTORICAL       Allergies:   Allergies   Allergen  Reactions     Hydroxyzine Hcl Other (See Comments)     Extreme sedation      Pentazocine Lactate Unknown       Immunizations:   Immunization History   Administered Date(s) Administered     Pneumo Polysac 23-V 07/17/2006     Td,adult,historic,unspecified 01/30/2009       Exam Chest clear to auscultation and percussion.  Heart tones regular rhythm without murmur rub or gallop.  Abdomen soft nontender no organomegaly.  No peritoneal signs.  Extremities free of edema cyanosis or clubbing.  Neck veins nondistended no thyromegaly or scleral icterus noted, carotids full.  Skin warm and dry easily conversant good spirited.  Normal intelligence.  Neurologically intact no gross localizing findings.    134/62 pulse 63 respirations 18 O2 sats 98% BMI is ideal at 22.  Weight up 2 pounds from previous.    Assessment and Plan  Hypertension controlled 134/62.  Check lipid panel today    Hyperlipidemia on statin therapy no history of MI or stroke.    Multiple drug allergies as listed above.    Microscopic hematuria with negative ultrasound of kidneys May 2019+ normal in office cystoscopy Dr. Watts [place] [facility].    Anxiety better with Celexa 10 mg daily continue same RTC 3 months time.    Time: total time spent with the patient was 25 minutes of which >50% was spent in counseling and coordination of care        Denver Rain MD    Patient Active Problem List   Diagnosis     Osteoarthritis Of The Ankle/Foot (Multiple Joints)     Palpitations     Osteopenia     Anemia     Fatigue     Cataract     Hyperlipidemia     Hypertension     Heartburn     Osteoarthritis Of The Knee     Cervical Spondylosis     Cervicalgia     Limb Pain     Headache     Chest Pain     Anxiety     TIA (transient ischemic attack)     Hyperlipidemia

## 2021-05-30 NOTE — TELEPHONE ENCOUNTER
Refill Approved    Rx renewed per Medication Renewal Policy. Medication was last renewed on 12/31/18  #90 R-1.    Last OV 4/16/19    Freda Junior, [facility] Triage/Med Refill 6/30/2019     Requested Prescriptions   Pending Prescriptions Disp Refills     citalopram (CELEXA) 10 MG tablet [Pharmacy Med Name: CITALOPRAM HBR 10 MG TABLET] 90 tablet 1     Sig: TAKE 1 TABLET BY MOUTH EVERY DAY       SSRI Refill Protocol  Passed - 6/30/2019 12:29 AM        Passed - PCP or prescribing provider visit in last year     Last office visit with prescriber/PCP: 1/11/2019 Denver Rain MD OR same dept: 1/11/2019 Denver Rain MD OR same specialty: 1/11/2019 Denver Rain MD  Last physical: 4/16/2019 Last MTM visit: Visit date not found   Next visit within 3 mo: Visit date not found  Next physical within 3 mo: Visit date not found  Prescriber OR PCP: Denver Rain MD  Last diagnosis associated with med order: 1. Anxiety  - citalopram (CELEXA) 10 MG tablet [Pharmacy Med Name: CITALOPRAM HBR 10 MG TABLET]; TAKE 1 TABLET BY MOUTH EVERY DAY  Dispense: 90 tablet; Refill: 1    If protocol passes may refill for 12 months if within 3 months of last provider visit (or a total of 15 months).

## 2021-05-31 ENCOUNTER — RECORDS - HEALTHEAST (OUTPATIENT)
Dept: ADMINISTRATIVE | Facility: CLINIC | Age: 86
End: 2021-05-31

## 2021-05-31 VITALS — BODY MASS INDEX: 20.42 KG/M2 | HEIGHT: 60 IN | WEIGHT: 104 LBS

## 2021-05-31 VITALS — WEIGHT: 106 LBS | HEIGHT: 60 IN | BODY MASS INDEX: 20.81 KG/M2

## 2021-05-31 VITALS — WEIGHT: 102 LBS | BODY MASS INDEX: 20.03 KG/M2 | HEIGHT: 60 IN

## 2021-05-31 NOTE — TELEPHONE ENCOUNTER
Refill Approved    Rx renewed per Medication Renewal Policy. Medication was last renewed on 1/2/19  #90 R-1.    Last OV 7/16/19    Freda Junior, [facility] Triage/Med Refill 8/8/2019     Requested Prescriptions   Pending Prescriptions Disp Refills     amLODIPine (NORVASC) 2.5 MG tablet [Pharmacy Med Name: AMLODIPINE BESYLATE 2.5 MG TAB] 90 tablet 0     Sig: TAKE 1 TABLET BY MOUTH EVERY DAY       Calcium-Channel Blockers Protocol Passed - 8/7/2019  1:29 AM        Passed - PCP or prescribing provider visit in past 12 months or next 3 months     Last office visit with prescriber/PCP: 7/16/2019 Denver Rain MD OR same dept: 7/16/2019 Denver Rain MD OR same specialty: 7/16/2019 Denver Rain MD  Last physical: 4/16/2019 Last MTM visit: Visit date not found   Next visit within 3 mo: Visit date not found  Next physical within 3 mo: Visit date not found  Prescriber OR PCP: Denver Rain MD  Last diagnosis associated with med order: 1. Essential hypertension  - amLODIPine (NORVASC) 2.5 MG tablet [Pharmacy Med Name: AMLODIPINE BESYLATE 2.5 MG TAB]; TAKE 1 TABLET BY MOUTH EVERY DAY  Dispense: 90 tablet; Refill: 0    If protocol passes may refill for 12 months if within 3 months of last provider visit (or a total of 15 months).             Passed - Blood pressure filed in past 12 months     BP Readings from Last 1 Encounters:   07/16/19 134/62

## 2021-06-01 VITALS — HEIGHT: 59 IN | WEIGHT: 102 LBS | BODY MASS INDEX: 20.56 KG/M2

## 2021-06-01 VITALS — WEIGHT: 102 LBS | HEIGHT: 60 IN | BODY MASS INDEX: 20.03 KG/M2

## 2021-06-01 VITALS — WEIGHT: 100 LBS | BODY MASS INDEX: 20.16 KG/M2 | HEIGHT: 59 IN

## 2021-06-02 ENCOUNTER — RECORDS - HEALTHEAST (OUTPATIENT)
Dept: ADMINISTRATIVE | Facility: CLINIC | Age: 86
End: 2021-06-02

## 2021-06-02 VITALS — BODY MASS INDEX: 19.96 KG/M2 | WEIGHT: 99 LBS | HEIGHT: 59 IN

## 2021-06-02 VITALS — BODY MASS INDEX: 19.96 KG/M2 | HEIGHT: 59 IN | WEIGHT: 99 LBS

## 2021-06-02 VITALS — BODY MASS INDEX: 20.36 KG/M2 | WEIGHT: 100.8 LBS

## 2021-06-02 VITALS — HEIGHT: 59 IN | WEIGHT: 103 LBS | BODY MASS INDEX: 20.76 KG/M2

## 2021-06-02 NOTE — TELEPHONE ENCOUNTER
Do you want patient to stop the atorvastatin and switch to rosuvastatin?    **Result Notes for Lipid Cascade     Notes recorded by Denver Rain MD on 10/24/2019 at 5:15 PM CDT  Needs rosuvastatin 5mg no 90 one daily and 3rf's for this mild hyperlipidemia

## 2021-06-02 NOTE — TELEPHONE ENCOUNTER
Rx teed to auth- patient notified and 3 month follow-up scheduled.     Should she continue her atorvastatin?    If so we will call patient and let her know.   No need to call back if she is to switch to rosuvastatin.

## 2021-06-02 NOTE — PROGRESS NOTES
Office Visit - Follow up    Helen Napoles   84 y.o. female    Date of Visit: 10/24/2019    Chief Complaint   Patient presents with     Hypertension     Hyperlipidemia       Subjective: Hypertension.    With hyperlipidemia.    She is fasting today.  Last lipids checked on July 16, 2019 and reviewed.    The patient denies any chest pain or shortness of breath.    No blood in stool or urine.    Medication reviewed well-tolerated and reconciled in the chart.  Occasionally she feels dizzy from either citalopram atorvastatin or amlodipine.    Allergies hydroxyzine and pentazocine.    Non smoker no alcohol.    ROS: A comprehensive review of systems was performed and was otherwise negative    Medications:  Prior to Admission medications    Medication Sig Start Date End Date Taking? Authorizing Provider   amLODIPine (NORVASC) 2.5 MG tablet TAKE 1 TABLET BY MOUTH EVERY DAY 8/8/19  Yes Denver Rain MD   aspirin 81 MG EC tablet Take 81 mg by mouth daily.    Yes PROVIDER, HISTORICAL   atorvastatin (LIPITOR) 20 MG tablet Take 1 tablet (20 mg total) by mouth daily. 4/18/19  Yes Denver Rain MD   citalopram (CELEXA) 10 MG tablet TAKE 1 TABLET BY MOUTH EVERY DAY 6/30/19  Yes Denver Rain MD   omeprazole (PRILOSEC) 20 MG capsule TAKE 1 CAPSULE (20 MG TOTAL) BY MOUTH DAILY. 2/20/19  Yes Denver Rain MD   betamethasone, augmented, (DIPROLENE-AF) 0.05 % cream APPLY SPARINGLY TO RASH TWICE DAILY 3/5/19   Shon Anderson MD   fluticasone (FLONASE) 50 mcg/actuation nasal spray Apply 2 sprays into each nostril daily.    PROVIDER, HISTORICAL       Allergies:   Allergies   Allergen Reactions     Hydroxyzine Hcl Other (See Comments)     Extreme sedation      Pentazocine Lactate Unknown       Immunizations:   Immunization History   Administered Date(s) Administered     Pneumo Polysac 23-V 07/17/2006     Td,adult,historic,unspecified 01/30/2009       Exam Chest clear to auscultation and percussion.   Heart tones regular rhythm without murmur rub or gallop.  Abdomen soft nontender no organomegaly.  No peritoneal signs.  Extremities free of edema cyanosis or clubbing.  Neck veins nondistended no thyromegaly or scleral icterus noted, carotids full.  Skin warm and dry easily conversant good spirited.  Normal intelligence.  Neurologically intact no gross localizing findings.    134/68 pulse 60 respirations 18 O2 sats 99% BMI 21 weight down 5 pounds from previous discussed importance of [place] Instant Breakfast caloric supplementation.  Ensure or boost.    Assessment and Plan  Hypertension with hyperlipidemia and underweight check lipid panel today.    Multiple drug allergies including pentazocine plus hydroxyzine.  RTC in 3 months time [place] instant breakfast or protein caloric supplement like Ensure advised.         The following low BMI interventions were performed this visit: weight gain advised    Denver Rain MD    Patient Active Problem List   Diagnosis     Osteoarthritis Of The Ankle/Foot (Multiple Joints)     Palpitations     Osteopenia     Anemia     Fatigue     Cataract     Hyperlipidemia     Hypertension     Heartburn     Osteoarthritis Of The Knee     Cervical Spondylosis     Cervicalgia     Limb Pain     Headache     Chest Pain     Anxiety     TIA (transient ischemic attack)     Hyperlipidemia

## 2021-06-02 NOTE — TELEPHONE ENCOUNTER
Test Results  Who is calling?:  Patient     Who ordered the test:  PCP     Type of test: Lab - Lipid [place]     Date of test:  10/24/19    Where was the test performed:  [facility]   What are your questions/concerns?:    Relayed Result Note for Lipid Cascade and patient states she will start Medication , When will PCP send to Pharmacy? And is she suppose to be taking a Calcium supplement to help lower Lipid levels. Please call back w/ detailed msg to relay regarding this matter . Pharmacy of choice is listed below .     Okay to leave a detailed message?:  Yes     [id number] ALANNA Conklin ( listed)

## 2021-06-02 NOTE — TELEPHONE ENCOUNTER
Patient needs rosuvastatin 5 mg tablets number 100 tablets take 1 tablet daily with 3 refills.  Please call patient and pharmacy.    I am not aware of any studies that state calcium helps in any way to lower lipid levels.  In the past calcium was thought to be a help in lowering blood pressure but not lipids.  Please asked the patient to take this medication and return to clinic in 3 months time for fasting office visit to check her lipid panel.  Thanks for calling patient and her pharmacy.

## 2021-06-02 NOTE — TELEPHONE ENCOUNTER
Questions about diet.    Questions answered.    Was recently placed on cholesterol medication and she thought that she could only eat fruit and veggies.    Pt advised that she can eat more than fruit and veggies.    It is ok to eat lean protein and whole fiber grains. A balanced diet is important. Pt told that she can also have sugar in moderation.    Melinda Somers, RN   [facility] Medication Refill and Triage Nurse  2:28 PM  11/3/2019      Reason for Disposition    Caller has medication question only, adult not sick, and triager answers question    Protocols used: MEDICATION QUESTION CALL-A-

## 2021-06-03 VITALS — WEIGHT: 105 LBS | BODY MASS INDEX: 21.17 KG/M2 | HEIGHT: 59 IN

## 2021-06-03 VITALS
SYSTOLIC BLOOD PRESSURE: 134 MMHG | BODY MASS INDEX: 20.16 KG/M2 | DIASTOLIC BLOOD PRESSURE: 68 MMHG | HEIGHT: 59 IN | WEIGHT: 100 LBS | OXYGEN SATURATION: 99 % | HEART RATE: 60 BPM

## 2021-06-04 VITALS
WEIGHT: 101 LBS | SYSTOLIC BLOOD PRESSURE: 124 MMHG | HEIGHT: 59 IN | HEART RATE: 60 BPM | DIASTOLIC BLOOD PRESSURE: 64 MMHG | BODY MASS INDEX: 20.36 KG/M2 | OXYGEN SATURATION: 99 %

## 2021-06-04 VITALS
DIASTOLIC BLOOD PRESSURE: 70 MMHG | HEIGHT: 59 IN | OXYGEN SATURATION: 98 % | WEIGHT: 104 LBS | HEART RATE: 72 BPM | SYSTOLIC BLOOD PRESSURE: 132 MMHG | BODY MASS INDEX: 20.96 KG/M2

## 2021-06-05 VITALS
HEIGHT: 59 IN | HEART RATE: 73 BPM | TEMPERATURE: 97.2 F | DIASTOLIC BLOOD PRESSURE: 70 MMHG | SYSTOLIC BLOOD PRESSURE: 130 MMHG | WEIGHT: 97 LBS | OXYGEN SATURATION: 97 % | BODY MASS INDEX: 19.56 KG/M2

## 2021-06-05 VITALS
OXYGEN SATURATION: 97 % | WEIGHT: 97 LBS | DIASTOLIC BLOOD PRESSURE: 66 MMHG | TEMPERATURE: 96.8 F | SYSTOLIC BLOOD PRESSURE: 138 MMHG | HEIGHT: 59 IN | BODY MASS INDEX: 19.56 KG/M2 | HEART RATE: 65 BPM

## 2021-06-05 NOTE — PROGRESS NOTES
Office Visit - Follow up    Helen Napoles   84 y.o. female    Date of Visit: 1/28/2020    Chief Complaint   Patient presents with     Hypertension     Hyperlipidemia       Subjective: Hypertension.    Hyperlipidemia.    Ankle edema may be exacerbated by amlodipine therapy.    Multiple drug allergies including hydroxyzine and pentazocine.    No blood in stool or urine denies chest pain shortness of breath medication list reviewed reconciled in the chart.    ROS: A comprehensive review of systems was performed and was otherwise negative    Medications:  Prior to Admission medications    Medication Sig Start Date End Date Taking? Authorizing Provider   amLODIPine (NORVASC) 2.5 MG tablet TAKE 1 TABLET BY MOUTH EVERY DAY 8/8/19  Yes Denver Rain MD   aspirin 81 MG EC tablet Take 81 mg by mouth daily.    Yes PROVIDER, HISTORICAL   betamethasone, augmented, (DIPROLENE-AF) 0.05 % cream APPLY SPARINGLY TO RASH TWICE DAILY 3/5/19  Yes Shon Anderson MD   citalopram (CELEXA) 10 MG tablet TAKE 1 TABLET BY MOUTH EVERY DAY 6/30/19  Yes Denver Rain MD   omeprazole (PRILOSEC) 20 MG capsule TAKE 1 CAPSULE (20 MG TOTAL) BY MOUTH DAILY. 2/20/19  Yes Denver Rain MD   rosuvastatin (CRESTOR) 5 MG tablet Take 1 tablet (5 mg total) by mouth at bedtime. 10/30/19  Yes Denver Rain MD   fluticasone (FLONASE) 50 mcg/actuation nasal spray Apply 2 sprays into each nostril daily.    PROVIDER, HISTORICAL       Allergies:   Allergies   Allergen Reactions     Hydroxyzine Hcl Other (See Comments)     Extreme sedation      Pentazocine Lactate Unknown       Immunizations:   Immunization History   Administered Date(s) Administered     Pneumo Polysac 23-V 07/17/2006     Td,adult,historic,unspecified 01/30/2009       Exam Chest clear to auscultation and percussion.  Heart tones regular rhythm without murmur rub or gallop.  Abdomen soft nontender no organomegaly.  No peritoneal signs.  Extremities free of edema  cyanosis or clubbing.  Neck veins nondistended no thyromegaly or scleral icterus noted, carotids full.  Skin warm and dry easily conversant good spirited.  Normal intelligence.  Neurologically intact no gross localizing findings.    124/64 pulse 60 respirations 18 O2 sats 99% BMI 20.75 weight up 1 pound from previous current weight 101 pounds.  Not in acute distress easily conversant.  Halitosis noted.    Assessment and Plan  Hypertension controlled check lipid panel today.  Stable 124/64.    Hyperlipidemia leg cramps in her left thigh muscle may be related to Crestor therapy.  Primary prevention no target organ damage related to same.    Stable.    Multiple drug allergies including hydroxyzine and pentazocine.    Halitosis uncertain etiology.    Anxiety better no sign of suicidal ideations and not depressed continue citalopram 10 mg daily same.    Acid reflux better with Prilosec stable.  RTC 4 to 6 months time.    Time: total time spent with the patient was 25 minutes of which >50% was spent in counseling and coordination of care        Denver Rain MD    Patient Active Problem List   Diagnosis     Osteoarthritis Of The Ankle/Foot (Multiple Joints)     Palpitations     Osteopenia     Anemia     Fatigue     Cataract     Hyperlipidemia     Hypertension     Heartburn     Osteoarthritis Of The Knee     Cervical Spondylosis     Cervicalgia     Limb Pain     Headache     Chest Pain     Anxiety     TIA (transient ischemic attack)     Hyperlipidemia

## 2021-06-06 NOTE — TELEPHONE ENCOUNTER
Refill Approved    Rx renewed per Medication Renewal Policy. Medication was last renewed on 2/20/19.    Janett Ordaz, [facility] Triage/Med Refill 3/6/2020     Requested Prescriptions   Pending Prescriptions Disp Refills     omeprazole (PRILOSEC) 20 MG capsule [Pharmacy Med Name: OMEPRAZOLE DR 20 MG CAPSULE] 90 capsule 3     Sig: TAKE 1 CAPSULE (20 MG TOTAL) BY MOUTH DAILY.       GI Medications Refill Protocol Passed - 3/6/2020  1:26 AM        Passed - PCP or prescribing provider visit in last 12 or next 3 months.     Last office visit with prescriber/PCP: 1/28/2020 Denver Rain MD OR same dept: 1/28/2020 Denver Rain MD OR same specialty: 1/28/2020 Denver Rain MD  Last physical: 4/16/2019 Last MTM visit: Visit date not found   Next visit within 3 mo: Visit date not found  Next physical within 3 mo: Visit date not found  Prescriber OR PCP: Denver Rain MD  Last diagnosis associated with med order: There are no diagnoses linked to this encounter.  If protocol passes may refill for 12 months if within 3 months of last provider visit (or a total of 15 months).

## 2021-06-09 NOTE — PROGRESS NOTES
Office Visit - Follow up    Helen Napoles   84 y.o. female    Date of Visit: 6/29/2020    Chief Complaint   Patient presents with     Follow-up     4 month follow up     Dizziness       Subjective: Hypertension.    Dizzy and forgetful.    Last week completed Lif[employer] screening all good.    No blood in stool or urine no chest pain shortness of breath.    Medication list reviewed reconciled.  Generally well-tolerated.    Allergies pentazocine and hydroxyzine.    ROS: A comprehensive review of systems was performed and was otherwise negative    Medications:  Prior to Admission medications    Medication Sig Start Date End Date Taking? Authorizing Provider   amLODIPine (NORVASC) 2.5 MG tablet TAKE 1 TABLET BY MOUTH EVERY DAY 8/8/19  Yes Denver Rain MD   aspirin 81 MG EC tablet Take 81 mg by mouth daily.    Yes PROVIDER, HISTORICAL   betamethasone, augmented, (DIPROLENE-AF) 0.05 % cream APPLY SPARINGLY TO RASH TWICE DAILY 3/5/19  Yes Shon Anderson MD   citalopram (CELEXA) 10 MG tablet TAKE 1 TABLET BY MOUTH EVERY DAY 6/30/19  Yes Denver Rain MD   omeprazole (PRILOSEC) 20 MG capsule Take 1 capsule (20 mg total) by mouth daily before breakfast. 3/6/20  Yes Denver Rain MD   rosuvastatin (CRESTOR) 5 MG tablet Take 1 tablet (5 mg total) by mouth at bedtime. 10/30/19  Yes Denver Rain MD   fluticasone (FLONASE) 50 mcg/actuation nasal spray Apply 2 sprays into each nostril daily.    PROVIDER, HISTORICAL       Allergies:   Allergies   Allergen Reactions     Hydroxyzine Hcl Other (See Comments)     Extreme sedation      Pentazocine Lactate Unknown       Immunizations:   Immunization History   Administered Date(s) Administered     Pneumo Polysac 23-V 07/17/2006     Td,adult,historic,unspecified 01/30/2009       Exam Chest clear to auscultation and percussion.  Heart tones regular rhythm without murmur rub or gallop.  Abdomen soft nontender no organomegaly.  No peritoneal signs.   Extremities free of edema cyanosis or clubbing.  Neck veins nondistended no thyromegaly or scleral icterus noted, carotids full.  Skin warm and dry easily conversant good spirited.  Normal intelligence.  Neurologically intact no gross localizing findings.  Trace leg edema.  102/70 pulse 72 respirations 18 O2 sats 98%.  Weight up 3 pounds from previous.  104 pounds currently.    Assessment and Plan  Hypertension check lipid panel today control.    Hyperlipidemia continue rosuvastatin 5 mg daily no history of target organ damage related to same.    Dizziness and forgetfulness uncertain etiology.  Continue close observation.    Acid dyspepsia better with omeprazole 20 mg daily continue same.  RTC 4 months time.    Time: total time spent with the patient was 25 minutes of which >50% was spent in counseling and coordination of care    The following low BMI interventions were performed this visit: weight gain advised    Denver Rain MD    Patient Active Problem List   Diagnosis     Osteoarthritis Of The Ankle/Foot (Multiple Joints)     Palpitations     Osteopenia     Anemia     Fatigue     Cataract     Hyperlipidemia     Hypertension     Heartburn     Osteoarthritis Of The Knee     Cervical Spondylosis     Cervicalgia     Limb Pain     Headache     Chest Pain     Anxiety     TIA (transient ischemic attack)     Hyperlipidemia

## 2021-06-09 NOTE — TELEPHONE ENCOUNTER
Refill Approved    Rx renewed per Medication Renewal Policy. Medication was last renewed on 8/8/19  Last OV: 6/29/20.    Melissa Pace, [facility] Triage/Med Refill 7/22/2020     Requested Prescriptions   Pending Prescriptions Disp Refills     amLODIPine (NORVASC) 2.5 MG tablet [Pharmacy Med Name: AMLODIPINE BESYLATE 2.5 MG TAB] 90 tablet 3     Sig: Take 1 tablet (2.5 mg total) by mouth daily.       Calcium-Channel Blockers Protocol Passed - 7/22/2020 12:08 AM        Passed - PCP or prescribing provider visit in past 12 months or next 3 months     Last office visit with prescriber/PCP: 6/29/2020 Denver Rain MD OR same dept: 6/29/2020 Denver Rain MD OR same specialty: 6/29/2020 Denver Rain MD  Last physical: 4/16/2019 Last MTM visit: Visit date not found   Next visit within 3 mo: Visit date not found  Next physical within 3 mo: Visit date not found  Prescriber OR PCP: Denver Rain MD  Last diagnosis associated with med order: 1. Essential hypertension  - amLODIPine (NORVASC) 2.5 MG tablet [Pharmacy Med Name: AMLODIPINE BESYLATE 2.5 MG TAB]; TAKE 1 TABLET BY MOUTH EVERY DAY  Dispense: 90 tablet; Refill: 3    If protocol passes may refill for 12 months if within 3 months of last provider visit (or a total of 15 months).             Passed - Blood pressure filed in past 12 months     BP Readings from Last 1 Encounters:   06/29/20 132/70

## 2021-06-10 NOTE — PROGRESS NOTES
CARDIOLOGY CLINIC CONSULT NOTE     Assessment/Plan:   1.  Atypical chest pain.  Likely musculoskeletal etiology.  Possibly related to fibromyalgia.  We will schedule a stress echocardiogram to be sure there is no evidence of coronary disease contributing to her symptoms.  She was advised to wait until the results of the stress test are completed then begin a regular brisk 20 minute daily walk.  2.  Essential hypertension with adequate control on low-dose amlodipine  Follow l up as needed for abnormal stress results     History of Present Illness:     It is my pleasure to see Helen Napoles at the [facility] clinic for evaluation of chest pain.    Helen Napoles is a 81 y.o. female with a past medical history of hypertension and osteoarthritis.  She has no history of diabetes mellitus.    She has no history of coronary artery disease.  She has had a variety of pains in her left upper chest both legs arms and shoulders along with some head pains at times.  She was diagnosed with fibromyalgia.  Associated shortness of breath diaphoresis or nausea with these episodes.  They do not occur with activities.  She does occasionally note some chest pains when she is rushing or vacuuming.  She climbs steps twice a month to visit a granddaughter.  She notes that she is short of breath with that but has no chest pain in association with it.  She has never had any stress testing performed.    Her last ECG from 2 years ago showed no acute ST segment changes.  No recent study has been performed.     Past Medical History:     Patient Active Problem List   Diagnosis     Osteoarthritis Of The Ankle/Foot (Multiple Joints)     Palpitations     Osteopenia     Anemia     Fatigue     Cataract     Hyperlipidemia     Hypertension     Heartburn     Osteoarthritis Of The Knee     Cervical Spondylosis     Cervicalgia     Limb Pain     Headache     Chest Pain     Anxiety     TIA (transient ischemic attack)     Hyperlipidemia  "      Past Surgical History:     Past Surgical History:   Procedure Laterality Date     OOPHORECTOMY      one ovary     ID TOTAL ABDOM HYSTERECTOMY      Description: Hysterectomy;  Recorded: 08/02/2007;       Family History:     Family History   Problem Relation Age of Onset     Cancer Mother 60     Lung     Ovarian cancer Maternal Aunt 22     Heart disease Mother      Heart disease Father        Social History:    reports that she has never smoked. She has never used smokeless tobacco. She reports that she does not drink alcohol or use illicit drugs.    Exercise: sHe does stretching exercises or walking in place for 30 minutes each day.  She never breaks a sweat or develops shortness of breath with this activity.    Sleep: Restorative    Meds:     Current Outpatient Prescriptions   Medication Sig Dispense Refill     amLODIPine (NORVASC) 2.5 MG tablet TAKE ONE TABLET BY MOUTH ONE TIME DAILY 90 tablet 2     aspirin 81 MG EC tablet Take 81 mg by mouth every other day.       No current facility-administered medications for this visit.        Allergies:   Hydroxyzine hcl and Pentazocine lactate    Review of Systems:     General: Weight Loss  Eyes: WNL  Ears/Nose/Throat: WNL  Lungs: WNL  Heart: Chest Pain, Leg Swelling  Stomach: WNL  Bladder: WNL  Muscle/Joints: Muscle Weakness  Skin: WNL  Nervous System: Dizziness  Mental Health: Confusion, Anxiety     Blood: WNL        Objective:      Physical Exam  111 lb (50.3 kg)  4' 11\" (1.499 m)  Body mass index is 22.42 kg/(m^2).  /70 (Patient Site: Left Arm, Patient Position: Sitting, Cuff Size: Adult Regular)  Pulse 72  Resp 16  Ht 4' 11\" (1.499 m)  Wt 111 lb (50.3 kg)  LMP 09/16/1987  BMI 22.42 kg/m2      General Appearance : Awake, Alert, No acute distress  HEENT: No Scleral icterus; the mucous membranes were pink and moist.  Conjunctivae not injected  Neck:  No cervical bruits, jugular venous distention, or thyromegaly  Chest: The spine was straight  Lungs: " Respirations unlabored; the lungs are clear to auscultation.  No wheezing   Cardiovascular:   Normal point of maximal impulse.  Auscultation reveals normal first and second heart sounds with no murmurs, rubs, or gallops.  Carotid, radial, and dorsalis pedal pulses and intact.  Abdomen: No organomegaly, masses, bruits, or tenderness. Bowels sounds are present  Extremities: Intact and symmetric distal pulses with no edema  Skin: No xanthelasma. Warm, Dry.  Musculoskeletal: No tenderness.  Neurologic:  No tenderness.      EKG:  March 8, 2015: Normal sinus rhythm.  Cannot rule out anterior infarct.  ECG today shows no change.  Sinus rhythm at 66 bpm    Cardiac Imaging Studies:  Echocardiogram 3/2015:  Summary  Normal left ventricular size and systolic function.  Left ventricular ejection fraction is visually estimated to be 65%.  No significant valvular abnormalities.    Lab Review   Lab Results   Component Value Date     03/11/2016    K 3.8 03/11/2016     03/11/2016    CO2 24 03/11/2016    BUN 10 03/11/2016    CREATININE 0.76 03/11/2016    CALCIUM 9.6 03/11/2016     Lab Results   Component Value Date    WBC 5.5 03/11/2016    WBC 6.0 03/08/2015    HGB 13.2 03/11/2016    HCT 39.4 03/11/2016    MCV 85 03/11/2016     03/11/2016     Lab Results   Component Value Date    CHOL 205 (H) 06/12/2015    TRIG 143 06/12/2015    HDL 65 06/12/2015    LDLCALC 111 06/12/2015     Lab Results   Component Value Date    TROPONINI <0.01 03/08/2015     No results found for: BNP  Lab Results   Component Value Date    TSH 2.19 07/22/2013     CT chest without contrast 4/2017:  CONCLUSION:  1. No etiology for symptoms evident. Lungs are clear. Small hiatal hernia present.      Shon Whittaker MD [facility]    His note created using Dragon voice recognition software. Sound alike errors may have escaped editing.

## 2021-06-10 NOTE — PROGRESS NOTES
Office Visit - Follow up    Helen Napoles   81 y.o. female    Date of Visit: 4/14/2017    Chief Complaint   Patient presents with     Neck Pain     and shoulder pain for 3-4 days     Fatigue     weak     Gait Problem     unsteady     Hypertension     blood pressure elevated in AM       Subjective: Chest pain hypertension neck and shoulder pain 3-4 days feels weak and unsteady in gait.  Feels like her heart is too big for chest for this 81-year-old female with a history of hypertension.  Blood pressure is high in the morning dizzy in the morning legs feel weak she still exercises weekly without chest pain syncope or undue shortness of breath no leg edema.  She denies paroxysmal nocturnal dyspnea.  No palpitations.    No blood in stool or urine.    Medication list reviewed well-tolerated there is an allergy to hydroxyzine as well as pending as is seen.    ROS: A comprehensive review of systems was performed and was otherwise negative    Medications:  Prior to Admission medications    Medication Sig Start Date End Date Taking? Authorizing Provider   amLODIPine (NORVASC) 2.5 MG tablet TAKE ONE TABLET BY MOUTH ONE TIME DAILY 7/28/16  Yes Denver Rain MD   aspirin 81 MG EC tablet Take 81 mg by mouth every other day.   Yes PROVIDER, HISTORICAL       Allergies:   Allergies   Allergen Reactions     Hydroxyzine Hcl      Pentazocine Lactate        Immunizations:   Immunization History   Administered Date(s) Administered     Pneumo Polysac 23-V 07/17/2006     Td, historic 01/30/2009       Exam Chest clear to auscultation and percussion.  Heart tones regular rhythm without murmur rub or gallop.  Abdomen soft nontender no organomegaly.  No peritoneal signs.  Extremities free of edema cyanosis or clubbing.  Neck veins nondistended no thyromegaly or scleral icterus noted, carotids full.  Skin warm and dry easily conversant good spirited.  Normal intelligence.  Neurologically intact no gross localizing  findings.    Assessment and Plan  Chest pain atypical for coronary ischemia and/or angina pectoris with associated neck and shoulder component.  Suggest CT scan of chest without contrast plus cardiac consultation return to clinic 2 months time.    Hypertension controlled.    Addendum mammogram negative September 16, 2014 and colonoscopy allCLEAR normal with colorectal surgery group August 30, 2013.    Multiple drug allergies.  RTC 2 months or sooner as needed    Time: total time spent with the patient was 25 minutes of which >50% was spent in counseling and coordination of care        Denver Rain MD    Patient Active Problem List   Diagnosis     Osteoarthritis Of The Ankle/Foot (Multiple Joints)     Palpitations     Osteopenia     Anemia     Fatigue     Cataract     Hyperlipidemia     Hypertension     Heartburn     Osteoarthritis Of The Knee     Cervical Spondylosis     Cervicalgia     Limb Pain     Headache     Chest Pain     Anxiety     TIA (transient ischemic attack)     Hyperlipidemia

## 2021-06-10 NOTE — TELEPHONE ENCOUNTER
Pt states she becomes weak and feels more forgetful since starting the rosuvastatin.   Pt states she has been on it for about 2 weeks, states she was not taking a night pill before only morning pills.   Per chart patient has been on this for awhile. Pt states she just started this a few weeks ago.   Nightmares that started this week.   Pt thought maybe she was having anxiety and kept mentioning a calcium supplement.   Pt stated she had lifeline screening done last week. Per last office appointment on 6/29/20 pt had lifeline screening done 1 week prior to that appointment.    Patient seems confused on the phone. States she has been seeing shadows or things that aren't there, then realizes that it is something else. Per protocol RN recommended 911 disposition. Pt states she will call her family member and have them take her to the ED for evaluation. Rn again advised of 911 disposition, pt again declined and stated she will go to ED.     Zelda Nance RN 08/11/20 5:12 PM  [facility] Nurse Advisor          Reason for Disposition    Seeing, hearing, or feeling things that are not there (i.e., visual, auditory, or tactile hallucinations)    Additional Information    Negative: [1] Difficult to awaken or acting confused (e.g., disoriented, slurred speech) AND [2] present now AND [3] new onset AND [4] has diabetes (diabetes mellitus)    Negative: [1] Difficult to awaken or acting confused (e.g., disoriented, slurred speech) AND [2] present now AND [3] new onset    Negative: [1] Weakness of the face, arm, or leg on one side of the body AND [2] new onset    Negative: [1] Numbness of the face, arm, or leg on one side of the body AND [2] new onset    Negative: [1] Loss of speech or garbled speech AND [2] new onset    Negative: Shock suspected (e.g., cold/pale/clammy skin, too weak to stand, low BP, rapid pulse)    Negative: Sounds like a life-threatening emergency to the triager    [1] New onset confusion AND [2] no  prior diagnosis of dementia    Negative: [1] Difficult to awaken or acting confused (e.g., disoriented, slurred speech) AND [2] present now AND [3] has diabetes (diabetes mellitus)    Negative: [1] Difficult to awaken or acting confused (e.g., disoriented, slurred speech) AND [2] present now AND [3] new onset    Negative: [1] Weakness of the face, arm, or leg on one side of the body AND [2] new onset    Negative: [1] Numbness of the face, arm, or leg on one side of the body AND [2] new onset    Negative: [1] Loss of speech or garbled speech AND [2] new onset    Negative: Difficulty breathing or bluish lips    Negative: Shock suspected (e.g., cold/pale/clammy skin, too weak to stand, low BP, rapid pulse)    Protocols used: CONFUSION - DELIRIUM-A-AH, DEMENTIA SYMPTOMS AND [id number]

## 2021-06-11 NOTE — TELEPHONE ENCOUNTER
Test Results  Who is calling?:  Patient  Who ordered the test:  Hospital provider  Type of test: Lab and ECG  Date of test:  8/11/2020  Where was the test performed:  [facility]  What are your questions/concerns?:  Would like to know what the results are.  Okay to leave a detailed message?:  Yes

## 2021-06-11 NOTE — PROGRESS NOTES
Office Visit - Physical    Helen Napoles   81 y.o. female    Date of Visit: 2017    Chief Complaint   Patient presents with     Pre-op Exam     Procedure left eye Dr. Coreas at [facility] on [phone]       Subjective: Preoperative examination for left eye procedure on 2017 at [facility] with Dr. Mini Coreas fax number there [phone].       Non-smoker no excess alcohol.    Allergy hydroxyzine and pentazocine.                    Number gram-negative 2014 colonoscopy normal 2013 colorectal surgery group presiding.    ROS: A comprehensive review of systems was performed and was otherwise negative    Medications:   Prior to Admission medications    Medication Sig Start Date End Date Taking? Authorizing Provider   amLODIPine (NORVASC) 2.5 MG tablet TAKE ONE TABLET BY MOUTH ONE TIME DAILY 17  Yes Denver Rain MD   aspirin 81 MG EC tablet Take 81 mg by mouth every other day.   Yes PROVIDER, HISTORICAL       Allergies:  Allergies   Allergen Reactions     Hydroxyzine Hcl      Pentazocine Lactate        Immunizations:   Immunization History   Administered Date(s) Administered     Pneumo Polysac 23-V 2006     Td, historic 2009       Health Maintenance: Immunizations reviewed up-to-date.    Past Medical History: Hypertension.  Hyperlipidemia.    Past Surgical History: Oophorectomy  unspecified reason.    Family History: Mother  age 60 lung cancer heavy smoker.    Father  stroke age 80.    No children a  since    of the complications of a stroke and myocardial infarction he was 78 years of age.    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    Exam Chest clear to auscultation and percussion.  Heart tones regular rhythm without murmur rub or gallop.  Abdomen soft nontender no organomegaly.  No peritoneal signs.  Extremities free of edema  cyanosis or clubbing.  Neck veins nondistended no thyromegaly or scleral icterus noted, carotids full.  Skin warm and dry easily conversant good spirited.  Normal intelligence.  Neurologically intact no gross localizing findings.    Assessment and Plan  Impaired vision need surgical intervention left eye June 16, 2017 [place] [facility] [place] with Dr. Coreas preoperatively check potassium level.    Multiple drug allergies see above plus hypertension controlled.  Prior oophorectomy 1952 for unspecified reasons with no postoperative complications.    Total time spent with the patient today was 40 minutes of which greater than 50% was spent in counseling and coordination of care.    Denver Rain MD    Patient Active Problem List   Diagnosis     Osteoarthritis Of The Ankle/Foot (Multiple Joints)     Palpitations     Osteopenia     Anemia     Fatigue     Cataract     Hyperlipidemia     Hypertension     Heartburn     Osteoarthritis Of The Knee     Cervical Spondylosis     Cervicalgia     Limb Pain     Headache     Chest Pain     Anxiety     TIA (transient ischemic attack)     Hyperlipidemia

## 2021-06-11 NOTE — TELEPHONE ENCOUNTER
Spoke with patient and she is not home now to go over things by phone. She states she had tests done at a [facility] health event and that is what she wants to discuss. Patient is going to mail us the results with attention Padmini on the envelope, so I know we got them and can call and set her up with a telephone visit to discuss.    Padmini Schroeder, CMA

## 2021-06-11 NOTE — PROGRESS NOTES
"Helen Napoles is a 85 y.o. female who is being evaluated via a billable telephone visit.      The patient has been notified of following:     \"This telephone visit will be conducted via a call between you and your physician/provider. We have found that certain health care needs can be provided without the need for a physical exam.  This service lets us provide the care you need with a short phone conversation.  If a prescription is necessary we can send it directly to your pharmacy.  If lab work is needed we can place an order for that and you can then stop by our lab to have the test done at a later time.    Telephone visits are billed at different rates depending on your insurance coverage. During this emergency period, for some insurers they may be billed the same as an in-person visit.  Please reach out to your insurance provider with any questions.    If during the course of the call the physician/provider feels a telephone visit is not appropriate, you will not be charged for this service.\"    Patient has given verbal consent to a Telephone visit? Yes    What phone number would you like to be contacted at? [phone]    Patient would like to receive their AVS by AVS Preference: Mail a copy.    Additional provider notes: Hypertension outside blood pressure readings reviewed.    Review results from emergency room visit of August 11, 2028 when she presented with weakness and confusion.  She thought it was related to rosuvastatin therapy and so quit it.  Her blood pressure 129/70 I did review all of the reports from that emergency room encounter with Dr. Eduardo Berry, ER specialist of August 11, 2020 and all seems to be quite good her CPK was normal her hemogram was good her comprehensive metabolic profile was good her urinalysis was clear and the CT scan of the head showed no problems.  She also has had recently [employer] screening this was reviewed.  All appears to be in order she feels well now.  No blood " in stool or urine no chest pain shortness of breath medication list reviewed well-tolerated normal effects reconciled in the chart.    Assessment/Plan:  Hypertension controlled.  129/70 no definite evidence for target organ damage related to same.  No history of MI or stroke.    Cognitive decline incumbent upon advanced age.  85 discussed in detail with the patient and her daughter was also on the line.    Hyperlipidemia on statin therapy or previously on statin therapy quit on her own because of side effects.  No history of target organ damage related to same.    Anxiety neurosis better with citalopram 10 mg daily no signs of depression no signs of suicidal ideations.  Continue all meds and cares same RTC 1 month time.      Phone call duration:  11 minutes    Codi Gutiérrez CMA

## 2021-06-11 NOTE — TELEPHONE ENCOUNTER
Patient needs a telephone visit with me to review and discuss.    Please make a paper copy of the medical test results that the patient has had and placed them on my desk with the patient's phone number for me to review.  Please call for me.  Patient needs the next step is to make a telephone visit with me sometime next week.  This week Thursday and Friday my schedule is full.

## 2021-06-13 NOTE — PROGRESS NOTES
Office Visit - Follow up    Helen Napoles   82 y.o. female    Date of Visit: 10/19/2017    Chief Complaint   Patient presents with     Hypertension     ER follow up  elevated blood pressure       Subjective: Hypertension legs weak.  Fatigue generalized.  Blood pressure has been elevated she has been seen in emergency room or urgency room.  Allergies to hydroxyzine and pentazocine noted she is on amlodipine 20 mg daily for blood pressure control plus aspirin 81 mg daily.  She is a  now lives alone.  No blood in stool or urine no chest pain or shortness of breath no syncopal spells no palpitations.    Medication list reviewed generally well-tolerated.    ROS: A comprehensive review of systems was performed and was otherwise negative    Medications:  Prior to Admission medications    Medication Sig Start Date End Date Taking? Authorizing Provider   amLODIPine (NORVASC) 2.5 MG tablet TAKE ONE TABLET BY MOUTH ONE TIME DAILY 6/2/17  Yes Denver Rain MD   aspirin 81 MG EC tablet Take 81 mg by mouth every other day.   Yes PROVIDER, HISTORICAL       Allergies:   Allergies   Allergen Reactions     Hydroxyzine Hcl Other (See Comments)     Extreme sedation      Pentazocine Lactate Unknown       Immunizations:   Immunization History   Administered Date(s) Administered     Pneumo Polysac 23-V 07/17/2006     Td, historic 01/30/2009       Exam Chest clear to auscultation and percussion.  Heart tones regular rhythm without murmur rub or gallop.  Abdomen soft nontender no organomegaly.  No peritoneal signs.  Extremities free of edema cyanosis or clubbing.  Neck veins nondistended no thyromegaly or scleral icterus noted, carotids full.  Skin warm and dry easily conversant good spirited.  Normal intelligence.  Neurologically intact no gross localizing findings.    Assessment and Plan  Hypertension with good control 130/66.  Pulse 72 and regular.  Check hemoglobin potassium TSH level plus vitamin B12 level and  urinalysis.    Multiple drug allergies including hydroxyzine and pentazocine.    Osteoarthritis left knee with mild synovial swelling.  No redness.    Borderline underweight.  BMI 20.70 weight down 3 pounds 106.  RTC 1 month.    Time: total time spent with the patient was 25 minutes of which >50% was spent in counseling and coordination of care        Denver Rain MD    Patient Active Problem List   Diagnosis     Osteoarthritis Of The Ankle/Foot (Multiple Joints)     Palpitations     Osteopenia     Anemia     Fatigue     Cataract     Hyperlipidemia     Hypertension     Heartburn     Osteoarthritis Of The Knee     Cervical Spondylosis     Cervicalgia     Limb Pain     Headache     Chest Pain     Anxiety     TIA (transient ischemic attack)     Hyperlipidemia

## 2021-06-15 ENCOUNTER — COMMUNICATION - HEALTHEAST (OUTPATIENT)
Dept: INTERNAL MEDICINE | Facility: CLINIC | Age: 86
End: 2021-06-15

## 2021-06-15 DIAGNOSIS — F03.90 DEMENTIA WITHOUT BEHAVIORAL DISTURBANCE, UNSPECIFIED DEMENTIA TYPE: ICD-10-CM

## 2021-06-15 NOTE — TELEPHONE ENCOUNTER
I tried to call the patient's daughter back by calling the patient I did not have the patient's daughter phone number.  I left a message with the patient's phone.  Please try to reach the daughter for me get her phone number that I can call her back thanks

## 2021-06-15 NOTE — PROGRESS NOTES
Office Visit - Follow up    Helen Napoles   82 y.o. female    Date of Visit: 2018    Chief Complaint   Patient presents with     Dizziness     Fatigue       Subjective: Anxiety with melancholia.  Try citalopram.  Patient wonders about her potassium level.  Currently not on a diuretic.  Allergies include hydroxyzine plus pentazocine.  Low energy level vacationed in December feels unhappy.  No blood in stool or urine no chest pain or shortness of breath today.  Some chest pain yesterday thought it may be heartburn or musculoskeletal.  Not related to exertion no associated nausea vomiting diaphoresis or shortness of breath.  Not brought on by exercise.  Medication list reviewed generally well-tolerated.  She exercises regularly without chest pain.      in  his seventh heart attack.    ROS: A comprehensive review of systems was performed and was otherwise negative    Medications:  Prior to Admission medications    Medication Sig Start Date End Date Taking? Authorizing Provider   amLODIPine (NORVASC) 2.5 MG tablet TAKE ONE TABLET BY MOUTH ONE TIME DAILY 17  Yes Denver Rain MD   aspirin 81 MG EC tablet Take 81 mg by mouth every other day.   Yes PROVIDER, HISTORICAL   citalopram (CELEXA) 10 MG tablet Take 1 tablet (10 mg total) by mouth daily. 18   Denver Rain MD       Allergies:   Allergies   Allergen Reactions     Hydroxyzine Hcl Other (See Comments)     Extreme sedation      Pentazocine Lactate Unknown       Immunizations:   Immunization History   Administered Date(s) Administered     Pneumo Polysac 23-V 2006     Td,adult,historic,unspecified 2009       Exam Chest clear to auscultation and percussion.  Heart tones regular rhythm without murmur rub or gallop.  Abdomen soft nontender no organomegaly.  No peritoneal signs.  Extremities free of edema cyanosis or clubbing.  Neck veins nondistended no thyromegaly or scleral icterus noted, carotids full.  Skin warm  and dry easily conversant good spirited.  Normal intelligence.  Neurologically intact no gross localizing findings.      Assessment and Plan  Anxiety.  Start citalopram 10 mg daily.  Return to clinic 6 weeks time.  Starts citalopram and check potassium level.    Multiple drug allergies including hydroxyzine and pentazocine.    Hypertension controlled.    Bradycardia with normal blood pressure 126/70.  Pulse today apically 56.    Fibromyalgia with posterior chest muscle ache and point tenderness trigger points.  Discussed encourage increased exercise.  Citalopram may be beneficial.  RTC 6 weeks.    Time: total time spent with the patient was 25 minutes of which >50% was spent in counseling and coordination of care        Denver Rain MD    Patient Active Problem List   Diagnosis     Osteoarthritis Of The Ankle/Foot (Multiple Joints)     Palpitations     Osteopenia     Anemia     Fatigue     Cataract     Hyperlipidemia     Hypertension     Heartburn     Osteoarthritis Of The Knee     Cervical Spondylosis     Cervicalgia     Limb Pain     Headache     Chest Pain     Anxiety     TIA (transient ischemic attack)     Hyperlipidemia

## 2021-06-15 NOTE — TELEPHONE ENCOUNTER
Refill Approved    Rx renewed per Medication Renewal Policy. Medication was last renewed on 3/6/20, last OV 2/18/21.    Gabriela Eaton, [facility] Triage/Med Refill 3/14/2021     Requested Prescriptions   Pending Prescriptions Disp Refills     omeprazole (PRILOSEC) 20 MG capsule [Pharmacy Med Name: OMEPRAZOLE DR 20 MG CAPSULE] 90 capsule 3     Sig: TAKE 1 CAPSULE (20 MG TOTAL) BY MOUTH DAILY BEFORE BREAKFAST.       GI Medications Refill Protocol Passed - 3/13/2021  1:44 PM        Passed - PCP or prescribing provider visit in last 12 or next 3 months.     Last office visit with prescriber/PCP: 2/18/2021 Denver Rain MD OR same dept: 6/29/2020 Denver Rain MD OR same specialty: 2/18/2021 Denver Rain MD  Last physical: 4/16/2019 Last MTM visit: Visit date not found   Next visit within 3 mo: Visit date not found  Next physical within 3 mo: Visit date not found  Prescriber OR PCP: Denver Rain MD  Last diagnosis associated with med order: 1. Heartburn  - omeprazole (PRILOSEC) 20 MG capsule [Pharmacy Med Name: OMEPRAZOLE DR 20 MG CAPSULE]; Take 1 capsule (20 mg total) by mouth daily before breakfast.  Dispense: 90 capsule; Refill: 3    If protocol passes may refill for 12 months if within 3 months of last provider visit (or a total of 15 months).

## 2021-06-15 NOTE — TELEPHONE ENCOUNTER
Daughter is calling back, her phone number is     [phone]    She is very concern about her Mom and possible dementia, or maybe it is just she has been shut in too long due to covid.    She states she will be sure to answer her phone when Dr. Rain calls her back.

## 2021-06-15 NOTE — TELEPHONE ENCOUNTER
Patients daughter would like to speak with Dr Rain about appointment with patient today please call the daughter back

## 2021-06-15 NOTE — TELEPHONE ENCOUNTER
She is calling to verify which medication her Dr wanted her to stop. No medication changes on her visit summary.  She says the celexa makes her very anxious and she doesn't want to take it anymore and she thought her Dr said she could stop it. There is nothing in chart about changing medication.  Follow up with the provider in the morning.    Message sent to PCP    Kiera Padron RN, Nurse Advisor        Reason for Disposition    Caller has NON-URGENT medication question about med that PCP prescribed and triager unable to answer question    Protocols used: MEDICATION QUESTION CALL-A-AH

## 2021-06-15 NOTE — TELEPHONE ENCOUNTER
Patient is calling back - she states that Dr. Rain told her to stop one of her medications, but she does not remember which one.    [phone] is the best number to call her.

## 2021-06-15 NOTE — TELEPHONE ENCOUNTER
Pt does not have consent on file to speak to any family    Please have pt complete a consent to communicate with daughter

## 2021-06-15 NOTE — PROGRESS NOTES
Assessment & Plan     Hypertension controlled.  138/66 no target organ damage related to same.    Multiple drug allergies including Vistaril and pentazocine.    Anxiety better now once off Celexa did not feel it was beneficial.  Okay to stop.    Impaired hearing suggest [facility] consultation with Dr. Natasha Bautista or Dr. Vinnie Amador at [phone].    Left breast mole on exam inspection only seborrheic keratosis the patient did not disrobe.        Review of external notes as documented in note  25 minutes spent on the date of the encounter doing chart review, patient visit and documentation        No follow-ups on file.    Denver Rain MD  [facility]   Helen Napoles is 85 y.o. and presents today for the following health issues   HPI       Hypertension Follow-up      Do you check your blood pressure regularly outside of the clinic? Yes     Are you following a low salt diet? No    Are your blood pressures ever more than 140 on the top number (systolic) OR more       than 90 on the bottom number (diastolic), for example 140/90? No      How many servings of fruits and vegetables do you eat daily?  0-1    On average, how many sweetened beverages do you drink each day (Examples: soda, juice, sweet tea, etc.  Do NOT count diet or artificially sweetened beverages)?   1    How many days per week do you exercise enough to make your heart beat faster? 3 or less    How many minutes a day do you exercise enough to make your heart beat faster? 9 or less    How many days per week do you miss taking your medication? 0        Review of Systems  No chest pain or shortness of breath no blood in stool or urine.  Medication list reviewed reconciled.  Non-smoker no excess alcohol.  There has been some weight loss and we did discuss the patient's weight in June 2020 was 104 today 97.  Her appetite is not been as good and she is not eating as much.  Other vital signs stable  Surgery "afebrile this morning.      Objective    /66 (Patient Site: Right Arm, Patient Position: Sitting)   Pulse 65   Temp 96.8  F (36  C)   Ht 4' 10.5\" (1.486 m)   Wt (!) 97 lb (44 kg)   LMP 09/16/1987   SpO2 97%   BMI 19.93 kg/m    Body mass index is 19.93 kg/m .  Physical Exam  Chest clear to auscultation and percussion.  Heart tones regular rhythm without murmur rub or gallop.  Abdomen soft nontender no organomegaly.  No peritoneal signs.  Extremities free of edema cyanosis or clubbing.  Neck veins nondistended no thyromegaly or scleral icterus noted, carotids full.  Skin warm and dry easily conversant good spirited.  Normal intelligence.  Neurologically intact no gross localizing findings.  Seborrheic keratosis left chest.  Benign reassured.              "

## 2021-06-16 NOTE — PROGRESS NOTES
Office Visit - Follow up    Helen Napoles   82 y.o. female    Date of Visit: 3/15/2018    Chief Complaint   Patient presents with     Chest Pain     ER follow up     Fatigue     Weight Loss     Medication Questions     Famotidine       Subjective: Hypertension.    Emergency room follow-up for chest pain.  Feels better with Pepcid 20 mg twice daily.  Some fatigue some weight loss but weight loss is only 6 pounds BMI remains around 20.    Full evaluation in the emergency room was reviewed.  D-dimer was slightly elevated at 2+.  CT scan of the chest looking for pulmonary embolus was negative.    No blood in stool or urine no chest pain or shortness of breath now medication list reviewed well-tolerated.    ROS: A comprehensive review of systems was performed and was otherwise negative    Medications:  Prior to Admission medications    Medication Sig Start Date End Date Taking? Authorizing Provider   amLODIPine (NORVASC) 2.5 MG tablet TAKE ONE TABLET BY MOUTH ONE TIME DAILY 3/4/18  Yes Denver Rain MD   aspirin 81 MG EC tablet Take 81 mg by mouth daily.    Yes PROVIDER, HISTORICAL   citalopram (CELEXA) 10 MG tablet Take 1 tablet (10 mg total) by mouth daily. 1/29/18  Yes Denver Rain MD   amLODIPine (NORVASC) 2.5 MG tablet Take 2.5 mg by mouth daily.  3/15/18 Yes PROVIDER, HISTORICAL   famotidine (PEPCID) 20 MG tablet Take 1 tablet (20 mg total) by mouth 2 (two) times a day. 2/21/18   Koby Crespo MD       Allergies:   Allergies   Allergen Reactions     Hydroxyzine Hcl Other (See Comments)     Extreme sedation      Pentazocine Lactate Unknown       Immunizations:   Immunization History   Administered Date(s) Administered     Pneumo Polysac 23-V 07/17/2006     Td,adult,historic,unspecified 01/30/2009       Exam Chest clear to auscultation and percussion.  Heart tones regular rhythm without murmur rub or gallop.  Abdomen soft nontender no organomegaly.  No peritoneal signs.  Extremities free of  edema cyanosis or clubbing.  Neck veins nondistended no thyromegaly or scleral icterus noted, carotids full.  Skin warm and dry easily conversant good spirited.  Normal intelligence.  Neurologically intact no gross localizing findings.    Assessment and Plan  Elevated d-dimer in a patient with underlying hypertension and atypical chest pain.  Multiple drug allergies including hydroxyzine and pentazocine noted.  Acid reflux better with famotidine continue same 20 mg twice daily.  Hypertension is controlled today 132/68.  O2 sats 98% RTC 2 months.    Time: total time spent with the patient was 25 minutes of which >50% was spent in counseling and coordination of care        Denver Rain MD    Patient Active Problem List   Diagnosis     Osteoarthritis Of The Ankle/Foot (Multiple Joints)     Palpitations     Osteopenia     Anemia     Fatigue     Cataract     Hyperlipidemia     Hypertension     Heartburn     Osteoarthritis Of The Knee     Cervical Spondylosis     Cervicalgia     Limb Pain     Headache     Chest Pain     Anxiety     TIA (transient ischemic attack)     Hyperlipidemia

## 2021-06-16 NOTE — TELEPHONE ENCOUNTER
Daughter Fe calling for recommendations or referral for Dentist and Eye Exams.    Patient and Fe are insistent that Dr. Rain recommended a specific Dentist and Eye Doctor for patient.    Reviewed last encounter with daughter and patient.    Requesting message is sent to Chelsea and Dr. Rain and call back with recommendations.

## 2021-06-16 NOTE — TELEPHONE ENCOUNTER
PT was seen yesterday on 03/17 by  Dr Kashif Churchill and she now by a Neuropsych MD    The soonest appt is 06/02/21    Fe is wondering if Dr. Rain can refer to another neuropsych or assist in getting an appt sooner.

## 2021-06-16 NOTE — TELEPHONE ENCOUNTER
He is check with the neurologist who made the referral I do not know of any neuropsychiatrist may be they do.  Dr. Churchill.  Was the neurologist who made the referral to the neuropsychiatrist.

## 2021-06-16 NOTE — TELEPHONE ENCOUNTER
Patient daughter calling to report that during patient appt this morning there were some incorrect information given.      Patient IS still taking her high blood pressure medication.  Amlodipine 2.5 mg.    Patient is NOT taking the Citalopram 10 mg.  Patient was getting painful leg cramps when she was taking this medication so she's stopped it at this time.    Daughter would like call back from clinical staff to ensure that it's okay for patient to stop taking the Citalopram.  Please call her to discuss.    [phone]. Okay to leave message if needed.

## 2021-06-16 NOTE — PROGRESS NOTES
Helen Napoles is a 85 y.o. female who is being evaluated via a billable telephone visit.      What phone number would you like to be contacted at? [phone]  How would you like to obtain your AVS? AVS Preference: Mail a copy.    Assessment & Plan     Hypertension not optimally controlled.  159/82.  Patient quit amlodipine on her own 2.5 mg daily for fear it was affecting cognitive function.    Dementia with MRI scan reviewed showing significant cerebral loss.  Neuropsych evaluation is pending.  Encourage neuropsych evaluation.  Daughter present during this telephone virtual visit.    Review of external notes as documented in note  21 minutes spent on the date of the encounter doing chart review, review of outside records, review of test results, interpretation of tests, patient visit, documentation and discussion with family        No follow-ups on file.    Denver Rain MD  [facility]   Helen Napoles is 85 y.o. and presents today for the following health issues   HPI       Hypertension Follow-up      Do you check your blood pressure regularly outside of the clinic? Yes     Are you following a low salt diet? No    Are your blood pressures ever more than 140 on the top number (systolic) OR more       than 90 on the bottom number (diastolic), for example 140/90? No      How many servings of fruits and vegetables do you eat daily?  0-1    On average, how many sweetened beverages do you drink each day (Examples: soda, juice, sweet tea, etc.  Do NOT count diet or artificially sweetened beverages)?   0    How many days per week do you exercise enough to make your heart beat faster? 3 or less    How many minutes a day do you exercise enough to make your heart beat faster? 9 or less    How many days per week do you miss taking your medication? 0        Review of Systems  No blood in stool or urine no chest pain or shortness of breath history is limited by cognitive  decline and dementia.  Daughter feels in Fe.    Non-smoker no excess alcohol.  Patient is noncompliant with medications.  Quit amlodipine on her own.  Sometime ago.  No significant side effects other than she thought it affected her memory.  No headache no facial flushing or leg edema.      Objective       Vitals:  No vitals were obtained today due to virtual visit.    Physical Exam  No physical examination was done as this was a virtual visit by telephone.  Blood pressure 159/82 as reported by patient and family pulse 76.            Phone call duration: 21 minutes

## 2021-06-17 NOTE — PROGRESS NOTES
Office Visit - Follow up    Helen Napoles   82 y.o. female    Date of Visit: 4/12/2018    Chief Complaint   Patient presents with     Rash     Itchy, non painful rash on right shoulde and back for 2 weeks       Subjective: Very pleasant 82-year-old -American patient of Dr. Mcmahon    Recently treated for symptoms of reflux and chest discomfort with famotidine.  Over the course the last week has had a pruritic rash on her back.  No previous known allergies.    Symptoms of heartburn and reflux and chest pain are much improved.    Had noted modest itching and discomfort with a rash in the right upper scapular region on the back.  Subsequently developed some roughening and dermatitis in the right lumbar region.    ROS: A comprehensive review of systems was performed and was otherwise negative except as mentioned above.     Exam  She has a mild maculopapular dermatitis in the right paralumbar region I do not appreciate rash in the right scapular region.  No other evidence of rash.   /60  Pulse 80  Ht 5' (1.524 m)  Wt 102 lb (46.3 kg)  LMP 09/16/1987  BMI 19.92 kg/m2    Assessment and Plan  I cannot say for sure that the rash is related to famotidine although it did start shortly after this.  It is more localized than one would expect however I will have her stop famotidine and begin omeprazole 20 mg daily.  Treat with betamethasone cream to use sparingly once or twice daily as needed for pruritic rash follow-up if not improved with Dr. Mcmahon    Helen was seen today for rash.    Diagnoses and all orders for this visit:    Heartburn    Dermatitis    Dyspepsia    Other orders  -     omeprazole (PRILOSEC) 20 MG capsule; Take 1 capsule (20 mg total) by mouth daily.  -     betamethasone, augmented, (DIPROLENE AF) 0.05 % cream; Apply sparingly bid for rash          Time: total time spent with the patient was 20 minutes of which >50% was spent in counseling and coordination of care        Allergies    Allergen Reactions     Hydroxyzine Hcl Other (See Comments)     Extreme sedation      Pentazocine Lactate Unknown       Medications :  Prior to Admission medications    Medication Sig Start Date End Date Taking? Authorizing Provider   amLODIPine (NORVASC) 2.5 MG tablet TAKE ONE TABLET BY MOUTH ONE TIME DAILY 3/4/18  Yes Denver Rain MD   aspirin 81 MG EC tablet Take 81 mg by mouth daily.    Yes PROVIDER, HISTORICAL   citalopram (CELEXA) 10 MG tablet Take 1 tablet (10 mg total) by mouth daily. 1/29/18  Yes Denver Rain MD   famotidine (PEPCID) 20 MG tablet TAKE 1 TABLET BY MOUTH 2 TIMES A DAY. 3/20/18 4/12/18 Yes Denver Rain MD   betamethasone, augmented, (DIPROLENE AF) 0.05 % cream Apply sparingly bid for rash 4/12/18   Bry Arthur MD   omeprazole (PRILOSEC) 20 MG capsule Take 1 capsule (20 mg total) by mouth daily. 4/12/18   Bry Arthur MD        Past Medical History:   Past Medical History:   Diagnosis Date     Anxiety      Benign essential HTN      Cataract      Cerebral vascular accident     tia     Cervical spondylosis      Cervicalgia      Chest pain      Family history of myocardial infarction     Mom 58     Fatigue      Headache      High cholesterol      HLD (hyperlipidemia)      Limb pain      Osteoarthritis      Osteoarthritis      Osteopenia      Palpitations        Past Surgical History:   Past Surgical History:   Procedure Laterality Date     OOPHORECTOMY      one ovary     AL TOTAL ABDOM HYSTERECTOMY      Description: Hysterectomy;  Recorded: 08/02/2007;       Social History:   Social History     Social History     Marital status:      Spouse name: N/A     Number of children: 0     Years of education: N/A     Occupational History     Not on file.     Social History Main Topics     Smoking status: Never Smoker     Smokeless tobacco: Never Used     Alcohol use No     Drug use: No     Sexual activity: Not on file     Other Topics Concern     Not on file     Social  History Narrative    , no children.        Family History:   Family History   Problem Relation Age of Onset     Cancer Mother 60     Lung     Heart disease Mother      Ovarian cancer Maternal Aunt 22     Stroke Father          Bry Arthur MD

## 2021-06-17 NOTE — PROGRESS NOTES
"    Assessment & Plan     Dementia try Aricept 5 mg daily.  Neuropsychology testing patient reviewed of Dr. Mcdermott.    Hypertension controlled for age 130/70.    Review of external notes as documented in note  25 minutes spent on the date of the encounter doing chart review, interpretation of tests, patient visit and documentation        No follow-ups on file.    Denver Rain MD  [facility]   Helen Napoles is 85 y.o. and presents today for the following health issues   HPI             Review of Systems  No blood in stool or urine no chest pain shortness of breath eats well weight is low.  No problems passing urine no blood in stool or urine medication list reviewed reconciled.  Generally well-tolerated.  Non-smoker no excess alcohol.      Objective    /70 (Patient Site: Right Arm, Patient Position: Sitting)   Pulse 73   Temp 97.2  F (36.2  C)   Ht 4' 10.5\" (1.486 m)   Wt (!) 97 lb (44 kg)   LMP 09/16/1987   SpO2 97%   BMI 19.93 kg/m    Body mass index is 19.93 kg/m .  Physical Exam  Chest clear no carotid bruits heart tones normal abdomen benign extremities free of edema personal hygiene questionable there was a smell of feculent matter on examination neck veins nondistended she is confused pleasant operative no behavioral disturbance.    Consultation of Dr. Mcdermott neuropsychologist reviewed in detail.  No driving and must be attended when out.  May be a candidate for Aricept plus may be a candidate for .  Adult with memory difficulty.              "

## 2021-06-18 NOTE — PROGRESS NOTES
Office Visit - Follow up    Helen Napoles   82 y.o. female    Date of Visit: 5/29/2018    Chief Complaint   Patient presents with     Results     f/u     Rash     f/u     Weight Loss     Concerns       Subjective: Hypertension.    Blood pressure is controlled.  122/62.    Follow-up lab results.  Seen by Dr. Shannon from our office.    Omeprazole prescribed.    Rash follow-up.  Weight concerns losing weight.  Last colonoscopy dated August 30, 2013 normal with colorectal [facility].  Stools are now brown and not black.  Previously the patient has had black stools for about a month.  Omeprazole seem to help.    In the past she had had a stroke small stroke she does not feel she ever had a stroke.  Feels better now.  Denies chest pain shortness of breath no seizure disorder.    Medication list reviewed well-tolerated.  Alert allergic to hydroxyzine plus pentazocine.    No blood in stool or urine no chest pain or shortness of breath.    ROS: A comprehensive review of systems was performed and was otherwise negative    Medications:  Prior to Admission medications    Medication Sig Start Date End Date Taking? Authorizing Provider   amLODIPine (NORVASC) 2.5 MG tablet TAKE ONE TABLET BY MOUTH ONE TIME DAILY 3/4/18  Yes Denver Rain MD   aspirin 81 MG EC tablet Take 81 mg by mouth daily.    Yes PROVIDER, HISTORICAL   betamethasone, augmented, (DIPROLENE AF) 0.05 % cream Apply sparingly bid for rash 4/12/18  Yes Bry Arthur MD   citalopram (CELEXA) 10 MG tablet TAKE 1 TABLET BY MOUTH EVERY DAY 4/23/18  Yes Denver Rain MD   omeprazole (PRILOSEC) 20 MG capsule Take 1 capsule (20 mg total) by mouth daily. 4/12/18  Yes Bry Arthur MD       Allergies:   Allergies   Allergen Reactions     Hydroxyzine Hcl Other (See Comments)     Extreme sedation      Pentazocine Lactate Unknown       Immunizations:   Immunization History   Administered Date(s) Administered     Pneumo Polysac 23-V 07/17/2006      Td,adult,historic,unspecified 01/30/2009       Exam Chest clear to auscultation and percussion.  Heart tones regular rhythm without murmur rub or gallop.  Abdomen soft nontender no organomegaly.  No peritoneal signs.  Extremities free of edema cyanosis or clubbing.  Neck veins nondistended no thyromegaly or scleral icterus noted, carotids full.  Skin warm and dry easily conversant good spirited.  Normal intelligence.  Neurologically intact no gross localizing findings.    Assessment and Plan  Hypertension control.  122/62.  Pulse stable.  65.  Regular.    Melena by history currently asymptomatic in this regard with brown stools.  Check hemoglobin plus Hemoccult testing ×3.    Multiple drug allergies.  Hydroxyzine and pentazocine.  RTC 1 month's time.  Recheck hemoglobin then and consideration for CT scan of abdomen and pelvis repeat colonoscopy and upper GI endoscopy.  If there is anything arrived with the patient's hemoglobin or symptomatology.  Like melena or hematochezia.    Time: total time spent with the patient was 25 minutes of which >50% was spent in counseling and coordination of care        Denver Rain MD    Patient Active Problem List   Diagnosis     Osteoarthritis Of The Ankle/Foot (Multiple Joints)     Palpitations     Osteopenia     Anemia     Fatigue     Cataract     Hyperlipidemia     Hypertension     Heartburn     Osteoarthritis Of The Knee     Cervical Spondylosis     Cervicalgia     Limb Pain     Headache     Chest Pain     Anxiety     TIA (transient ischemic attack)     Hyperlipidemia

## 2021-06-18 NOTE — LETTER
Letter by Denver Rain MD at      Author: Denver Rain MD Service: -- Author Type: --    Filed:  Encounter Date: 1/14/2019 Status: (Other)       Helen Napoles  [address] [place] [address]             January 14, 2019         Dear Ms. Napoles,    Below are the results from your recent visit:    Resulted Orders   HM2(CBC w/o Differential)   Result Value Ref Range    WBC 5.0 4.0 - 11.0 thou/uL    RBC 4.89 3.80 - 5.40 mill/uL    Hemoglobin 13.5 12.0 - 16.0 g/dL    Hematocrit 43.7 35.0 - 47.0 %    MCV 89 80 - 100 fL    MCH 27.6 27.0 - 34.0 pg    MCHC 30.9 (L) 32.0 - 36.0 g/dL    RDW 13.1 11.0 - 14.5 %    Platelets 287 140 - 440 thou/uL    MPV 9.5 8.5 - 12.5 fL   Comprehensive Metabolic Panel   Result Value Ref Range    Sodium 140 136 - 145 mmol/L    Potassium 4.4 3.5 - 5.0 mmol/L    Chloride 103 98 - 107 mmol/L    CO2 24 22 - 31 mmol/L    Anion Gap, Calculation 13 5 - 18 mmol/L    Glucose 104 70 - 125 mg/dL    BUN 15 8 - 28 mg/dL    Creatinine 0.92 0.60 - 1.10 mg/dL    GFR MDRD Af Amer >60 >60 mL/min/1.73m2    GFR MDRD Non Af Amer 59 (L) >60 mL/min/1.73m2    Bilirubin, Total 1.5 (H) 0.0 - 1.0 mg/dL    Calcium 9.3 8.5 - 10.5 mg/dL    Protein, Total 7.1 6.0 - 8.0 g/dL    Albumin 3.7 3.5 - 5.0 g/dL    Alkaline Phosphatase 80 45 - 120 U/L    AST 21 0 - 40 U/L    ALT 21 0 - 45 U/L    Narrative    Fasting Glucose reference range is 70-99 mg/dL per  American Diabetes Association (ADA) guidelines.       All very good results    Please call with questions or contact us using [email or web address].    Sincerely,        Electronically signed by Denver Rain MD

## 2021-06-18 NOTE — PROGRESS NOTES
Office Visit - Follow up    Helen Napoles   82 y.o. female    Date of Visit: 6/25/2018    Chief Complaint   Patient presents with     Hypertension       Subjective: Hypertension.    One-month follow-up.  Current dose of amlodipine low-dose 2.5 mg daily well-tolerated.  No significant leg edema no flushing or headache.    The patient's blood pressure is better controlled 130/64 she does have a history of transient ischemic attack plus hyperlipidemia.    The patient notes no change in her anxiety or melancholy up feelings.  No suicidal ideations.  She wonders if she should continue citalopram low-dose 10 mg daily.  I suggested she seems improved on it her sleep pattern is better she has no paranoia and no sign of depression she has no suicidal ideations.  Her nerves are good she can eat well her weight is down 2 pounds.    Medication list reviewed well-tolerated normal effects.  Allergies include hydroxyzine and pentazocine.  No blood in stool or urine no chest pain or shortness of breath no hemoptysis.    ROS: A comprehensive review of systems was performed and was otherwise negative    Medications:  Prior to Admission medications    Medication Sig Start Date End Date Taking? Authorizing Provider   amLODIPine (NORVASC) 2.5 MG tablet TAKE ONE TABLET BY MOUTH ONE TIME DAILY 3/4/18  Yes Denver Rain MD   citalopram (CELEXA) 10 MG tablet Take 1 tablet (10 mg total) by mouth daily. 6/13/18  Yes Denver Rain MD   omeprazole (PRILOSEC) 20 MG capsule Take 1 capsule (20 mg total) by mouth daily. 4/12/18  Yes Bry Arthur MD   aspirin 81 MG EC tablet Take 81 mg by mouth daily.     PROVIDER, HISTORICAL   betamethasone, augmented, (DIPROLENE AF) 0.05 % cream Apply sparingly bid for rash 4/12/18   Bry Arthur MD       Allergies:   Allergies   Allergen Reactions     Hydroxyzine Hcl Other (See Comments)     Extreme sedation      Pentazocine Lactate Unknown       Immunizations:   Immunization History    Administered Date(s) Administered     Pneumo Polysac 23-V 07/17/2006     Td,adult,historic,unspecified 01/30/2009       Exam Chest clear to auscultation and percussion.  Heart tones regular rhythm without murmur rub or gallop.  Abdomen soft nontender no organomegaly.  No peritoneal signs.  Extremities free of edema cyanosis or clubbing.  Neck veins nondistended no thyromegaly or scleral icterus noted, carotids full.  Skin warm and dry easily conversant good spirited.  Normal intelligence.  Neurologically intact no gross localizing findings.  No carotid bruits easily conversant no leg edema does not appear flushed mood is good affect normal.    Assessment and Plan  Hypertension with improved control on low-dose amlodipine.  130/64 pulse 60 O2 sats 98%.    Multiple drug allergies hydroxyzine and pentazocine.    Anxiety with melancholy a better with citalopram low-dose 10 mg daily continue same.  If the patient elects to stop the citalopram I did suggest a gentle taper over 30 day.  Taking 10 mg every other day for 30 day.  Then DC.    Dark stools previously reported now normal.  RTC 2 months.  Or sooner as needed.    Time: total time spent with the patient was 25 minutes of which >50% was spent in counseling and coordination of care        Denver Rain MD    Patient Active Problem List   Diagnosis     Osteoarthritis Of The Ankle/Foot (Multiple Joints)     Palpitations     Osteopenia     Anemia     Fatigue     Cataract     Hyperlipidemia     Hypertension     Heartburn     Osteoarthritis Of The Knee     Cervical Spondylosis     Cervicalgia     Limb Pain     Headache     Chest Pain     Anxiety     TIA (transient ischemic attack)     Hyperlipidemia

## 2021-06-19 ENCOUNTER — COMMUNICATION - HEALTHEAST (OUTPATIENT)
Dept: INTERNAL MEDICINE | Facility: CLINIC | Age: 86
End: 2021-06-19

## 2021-06-19 DIAGNOSIS — F03.90 DEMENTIA WITHOUT BEHAVIORAL DISTURBANCE, UNSPECIFIED DEMENTIA TYPE: ICD-10-CM

## 2021-06-19 NOTE — LETTER
Letter by Denver Rain MD at      Author: Denver Rain MD Service: -- Author Type: --    Filed:  Encounter Date: 7/17/2019 Status: (Other)         Helen Napoles  [address] [place] [address]             July 17, 2019         Dear Ms. Napoles,    Below are the results from your recent visit:    Resulted Orders   Lipid Cascade   Result Value Ref Range    Cholesterol 157 <=199 mg/dL    Triglycerides 80 <=149 mg/dL    HDL Cholesterol 69 >=50 mg/dL    LDL Calculated 72 <=129 mg/dL    Patient Fasting > 8hrs? Yes        All very good results     Please call with questions or contact us using [employer].    Sincerely,        Electronically signed by Denver Rain MD

## 2021-06-19 NOTE — LETTER
Letter by Denver Rain MD at      Author: Denver Rain MD Service: -- Author Type: --    Filed:  Encounter Date: 11/1/2019 Status: Signed         Helen Napoles  [address] [place] [address]             November 1, 2019         Dear Ms. Napoles,    Below are the results from your recent visit:    Resulted Orders   Lipid Cascade   Result Value Ref Range    Cholesterol 232 (H) <=199 mg/dL    Triglycerides 113 <=149 mg/dL    HDL Cholesterol 67 >=50 mg/dL    LDL Calculated 142 (H) <=129 mg/dL    Patient Fasting > 8hrs? Yes        Needs rosuvastatin 5mg no 90 one daily and 3rf's for this mild hyperlipidemia    Please call with questions or contact us using [employer].    Sincerely,        Electronically signed by Denver Rain MD

## 2021-06-19 NOTE — LETTER
Letter by Denver Rain MD at      Author: Denver Rain MD Service: -- Author Type: --    Filed:  Encounter Date: 4/17/2019 Status: (Other)         Helen Napoles  [address] [place] [address]             April 17, 2019         Dear Ms. Napoles,    Below are the results from your recent visit:    Resulted Orders   HM2(CBC w/o Differential)   Result Value Ref Range    WBC 4.5 4.0 - 11.0 thou/uL    RBC 4.87 3.80 - 5.40 mill/uL    Hemoglobin 13.4 12.0 - 16.0 g/dL    Hematocrit 40.4 35.0 - 47.0 %    MCV 83 80 - 100 fL    MCH 27.5 27.0 - 34.0 pg    MCHC 33.2 32.0 - 36.0 g/dL    RDW 12.5 11.0 - 14.5 %    Platelets 286 140 - 440 thou/uL    MPV 7.8 7.0 - 10.0 fL     All very good results     Please call with questions or contact us using [email or web address].    Sincerely,        Electronically signed by Denver Rain MD

## 2021-06-19 NOTE — LETTER
Letter by Denver Rain MD at      Author: Denver Rain MD Service: -- Author Type: --    Filed:  Encounter Date: 4/17/2019 Status: (Other)         Helen Napoles  [address] [place] [address]             April 17, 2019         Dear Ms. Napoles,    Below are the results from your recent visit:    Resulted Orders   HM2(CBC w/o Differential)   Result Value Ref Range    WBC 4.5 4.0 - 11.0 thou/uL    RBC 4.87 3.80 - 5.40 mill/uL    Hemoglobin 13.4 12.0 - 16.0 g/dL    Hematocrit 40.4 35.0 - 47.0 %    MCV 83 80 - 100 fL    MCH 27.5 27.0 - 34.0 pg    MCHC 33.2 32.0 - 36.0 g/dL    RDW 12.5 11.0 - 14.5 %    Platelets 286 140 - 440 thou/uL    MPV 7.8 7.0 - 10.0 fL   Comprehensive Metabolic Panel   Result Value Ref Range    Sodium 142 136 - 145 mmol/L    Potassium 4.1 3.5 - 5.0 mmol/L    Chloride 107 98 - 107 mmol/L    CO2 26 22 - 31 mmol/L    Anion Gap, Calculation 9 5 - 18 mmol/L    Glucose 104 70 - 125 mg/dL    BUN 18 8 - 28 mg/dL    Creatinine 0.84 0.60 - 1.10 mg/dL    GFR MDRD Af Amer >60 >60 mL/min/1.73m2    GFR MDRD Non Af Amer >60 >60 mL/min/1.73m2    Bilirubin, Total 0.8 0.0 - 1.0 mg/dL    Calcium 9.3 8.5 - 10.5 mg/dL    Protein, Total 6.9 6.0 - 8.0 g/dL    Albumin 3.5 3.5 - 5.0 g/dL    Alkaline Phosphatase 80 45 - 120 U/L    AST 24 0 - 40 U/L    ALT 19 0 - 45 U/L    Narrative    Fasting Glucose reference range is 70-99 mg/dL per  American Diabetes Association (ADA) guidelines.   Lipid Cascade   Result Value Ref Range    Cholesterol 231 (H) <=199 mg/dL    Triglycerides 86 <=149 mg/dL    HDL Cholesterol 75 >=50 mg/dL    LDL Calculated 139 (H) <=129 mg/dL    Patient Fasting > 8hrs? No    Thyroid Stimulating Hormone (TSH)   Result Value Ref Range    TSH 1.64 0.30 - 5.00 uIU/mL   Urinalysis-UC if Indicated   Result Value Ref Range    Color, UA Yellow Colorless, Yellow, Straw, Light Yellow    Clarity, UA Clear Clear    Glucose, UA Negative Negative    Bilirubin, UA Negative  Negative    Ketones, UA Negative Negative    Specific Gravity, UA 1.019 1.001 - 1.030    Blood, UA Small (!) Negative    pH, UA 6.0 4.5 - 8.0    Protein, UA Trace (!) Negative mg/dL    Urobilinogen, UA <2.0 E.U./dL <2.0 E.U./dL, 2.0 E.U./dL    Nitrite, UA Negative Negative    Leukocytes, UA Negative Negative    Bacteria, UA Few (!) None Seen hpf    RBC, UA 3-5 (!) None Seen, 0-2 hpf    WBC, UA 0-5 None Seen, 0-5 hpf    Squam Epithel, UA 5-10 (!) None Seen, 0-5 lpf    Mucus, UA Few (!) None Seen lpf    Narrative    UC not indicated        Out hyperlipidemia as noted and would suggest atorvastatin 20 mg tablets number 90 tablets take 1  tablet daily with 5 refills.      Also microscopic hematuria is noted and would recommend a formal [facility] or [place] [facility]  consult at [phone] for evaluation of microscopic hematuria.      All other labs are quite good.  Please call patient and I will send an order for urology consult.     Please call with questions or contact us using [email or web address].    Sincerely,        Electronically signed by Denver Rain MD

## 2021-06-20 NOTE — LETTER
Letter by Denver Rain MD at      Author: Denver Rain MD Service: -- Author Type: --    Filed:  Encounter Date: 6/30/2020 Status: (Other)         Helen Napoles  [address] [place] [address]             June 30, 2020         Dear Ms. Napoles,    Below are the results from your recent visit:    Resulted Orders   Lipid Cascade   Result Value Ref Range    Cholesterol 153 <=199 mg/dL    Triglycerides 87 <=149 mg/dL    HDL Cholesterol 64 >=50 mg/dL    LDL Calculated 72 <=129 mg/dL    Patient Fasting > 8hrs? Yes         All very good results     Please call with questions or contact us using [email or web address].    Sincerely,        Electronically signed by Denver Rain MD

## 2021-06-20 NOTE — LETTER
Letter by Denver Rain MD at      Author: Denver Rain MD Service: -- Author Type: --    Filed:  Encounter Date: 1/29/2020 Status: (Other)         Helen Napoles  [address] [place] [address]             January 29, 2020         Dear Ms. Napoles,    Below are the results from your recent visit:    Resulted Orders   Lipid Cascade   Result Value Ref Range    Cholesterol 158 <=199 mg/dL    Triglycerides 96 <=149 mg/dL    HDL Cholesterol 69 >=50 mg/dL    LDL Calculated 70 <=129 mg/dL    Patient Fasting > 8hrs? Yes        All very good results     Please call with questions or contact us using [employer].    Sincerely,        Electronically signed by Denver Rain MD

## 2021-06-22 NOTE — PROGRESS NOTES
Assessment:        Upper respiratory infection with post-nasal drip.       Plan:       Mucinex.  Trial of nasal steroids.  Fluids, rest.  Discussed signs of worsening symptoms and when to follow-up with PCP if no symptom improvement.       Patient Instructions   You were seen today for a cough. This is likely due to a virus and will improve over the next 1-2 weeks on its own.    Symptom management:  - Drink plenty of non-caffeinated fluids  - Avoid smoke exposure  - May use tylenol or ibuprofen for discomfort  - Drink a warm non-caffeinated tea with honey  - Place a warm humidifier in your bedroom at night  - Guaifenesin to help loosen cough secretions  - Flonase to help with sinus congestion    Reasons to return for re-evaluation:  - Develop a fever 100.4 or higher, current fever worsens, or fever does not improve in 72 hours  - Difficulty breathing or shortness of breath  - Cough continues to worsen including coughing up blood or coughing up thick, colored phlegm  - Unable to tolerate fluids    Otherwise, if symptoms have not improved in 7 days, follow-up with your primary care provider.      Subjective:        Helen Napoles is a 83 y.o. female here for evaluation of a cough. The cough is non-productive, without wheezing, dyspnea or hemoptysis. Onset of symptoms was 4 days ago, unchanged since that time.  Associated symptoms include clear phlegm that gets stuck in the back of the throat. Patient does not have a history of asthma. Patient has not had recent travel. Patient does not have a history of smoking. Patient denies fevers, headaches, sinus congestion, and sore throat.    The following portions of the patient's history were reviewed and updated as appropriate: allergies, current medications and problem list.    Review of Systems  Pertinent items are noted in HPI.     Allergies  Allergies   Allergen Reactions     Hydroxyzine Hcl Other (See Comments)     Extreme sedation      Pentazocine Lactate Unknown      Family History   Family History   Problem Relation Age of Onset     Cancer Mother 60        Lung     Heart disease Mother      Ovarian cancer Maternal Aunt 22     Stroke Father        Social History  Social History     Socioeconomic History     Marital status:      Spouse name: None     Number of children: 0     Years of education: None     Highest education level: None   Social Needs     Financial resource strain: None     Food insecurity - worry: None     Food insecurity - inability: None     Transportation needs - medical: None     Transportation needs - non-medical: None   Occupational History     None   Tobacco Use     Smoking status: Never Smoker     Smokeless tobacco: Never Used   Substance and Sexual Activity     Alcohol use: No     Drug use: No     Sexual activity: None   Other Topics Concern     None   Social History Narrative    , no children.               Objective:       /70 (Patient Site: Right Arm, Patient Position: Sitting, Cuff Size: Adult Regular)   Pulse 60   Temp 98  F (36.7  C) (Oral)   Resp 14   Wt 100 lb 12.8 oz (45.7 kg)   LMP 09/16/1987   SpO2 99%   BMI 20.36 kg/m    General appearance: alert, appears stated age, cooperative, no distress and non-toxic  Head: Normocephalic, without obvious abnormality, atraumatic; sinuses non-tender to percussion  Ears: TM's intact with mucoid fluid, no erythema or bulging; external ears normal  Nose: no discharge  Throat: post-nasal drip seen; no tonsil swelling, erythema, or exudate; MMM, lips and tongue normal  Neck: no adenopathy and supple, symmetrical, trachea midline  Lungs: clear to auscultation bilaterally and no rhonchi, rales, or wheezing  Heart: regular rate and rhythm, S1, S2 normal, no murmur, click, rub or gallop

## 2021-06-22 NOTE — TELEPHONE ENCOUNTER
Refill Approved    Rx renewed per Medication Renewal Policy. Medication was last renewed on 3/4/2018 for 90/2  Last OV 6/25/2018  Christine Whittington, [facility] Triage/Med Refill 1/2/2019     Requested Prescriptions   Pending Prescriptions Disp Refills     amLODIPine (NORVASC) 2.5 MG tablet [Pharmacy Med Name: AMLODIPINE BESYLATE 2.5 MG TAB] 90 tablet 2     Sig: TAKE ONE TABLET BY MOUTH ONE TIME DAILY    Calcium-Channel Blockers Protocol Passed - 1/2/2019 12:45 AM       Passed - PCP or prescribing provider visit in past 12 months or next 3 months    Last office visit with prescriber/PCP: 6/25/2018 Denver Rain MD OR same dept: 6/25/2018 Denver Rain MD OR same specialty: 6/25/2018 Denver Rain MD  Last physical: 6/8/2017 Last MTM visit: Visit date not found   Next visit within 3 mo: Visit date not found  Next physical within 3 mo: Visit date not found  Prescriber OR PCP: Denver Rain MD  Last diagnosis associated with med order: 1. Essential hypertension  - amLODIPine (NORVASC) 2.5 MG tablet [Pharmacy Med Name: AMLODIPINE BESYLATE 2.5 MG TAB]; TAKE ONE TABLET BY MOUTH ONE TIME DAILY  Dispense: 90 tablet; Refill: 2    If protocol passes may refill for 12 months if within 3 months of last provider visit (or a total of 15 months).            Passed - Blood pressure filed in past 12 months    BP Readings from Last 1 Encounters:   12/15/18 128/70

## 2021-06-22 NOTE — PROGRESS NOTES
[facility] Follow Up Note    Helen Napoles   83 y.o. female    Date of Visit: 1/2/2019    Chief Complaint   Patient presents with     Cough     was seen at walk-in, getting worse again     Subjective  This is an 83-year-old patient of Dr. Denver Rain.  She has been dealing with symptoms of a respiratory infection now for over 3 weeks.  On December 15 she actually went to walk-in clinic and I have reviewed those notes.  They felt this was most likely viral and treated her symptomatically.  She has not improved.  She continues to have a mildly productive cough she has some night sweats but no distinct fever or chills otherwise.  The Mucinex and nasal spray have not helped her to this point in time and she thought she should seek help.  She is otherwise been in her usual state of health.    ROS A comprehensive review of systems was performed and was otherwise negative    Medications, allergies, and problem list were reviewed and updated    Exam  General Appearance:   On examination her blood pressure is 136/70.  Weight is 99 pounds and height is 59 inches.  BMI is 20.00.    Heart is in a sinus rhythm with a rate of 70 and no ectopy.    No enlarged cervical lymph nodes.    Lungs are clear.    The patient is alert and oriented x3.      Assessment/Plan  1. Upper respiratory tract infection, unspecified type       Persistent respiratory infection.  Given the length of time this is persisted I am going to put her on a Z-Hunter and have her follow-up with her physician if she is not improving.      Vicente Almeida MD      Current Outpatient Medications on File Prior to Visit   Medication Sig     aspirin 81 MG EC tablet Take 81 mg by mouth daily.      betamethasone, augmented, (DIPROLENE AF) 0.05 % cream Apply sparingly bid for rash     citalopram (CELEXA) 10 MG tablet Take 1 tablet (10 mg total) by mouth daily.     fluticasone (FLONASE) 50 mcg/actuation nasal spray Apply 2 sprays into each  nostril daily.     guaiFENesin ER (MUCINEX) 600 mg 12 hr tablet Take 1 tablet (600 mg total) by mouth 2 (two) times a day for 7 days.     omeprazole (PRILOSEC) 20 MG capsule Take 1 capsule (20 mg total) by mouth daily.     [DISCONTINUED] amLODIPine (NORVASC) 2.5 MG tablet TAKE ONE TABLET BY MOUTH ONE TIME DAILY     No current facility-administered medications on file prior to visit.      Allergies   Allergen Reactions     Hydroxyzine Hcl Other (See Comments)     Extreme sedation      Pentazocine Lactate Unknown     Social History     Tobacco Use     Smoking status: Never Smoker     Smokeless tobacco: Never Used   Substance Use Topics     Alcohol use: No     Drug use: No

## 2021-06-23 NOTE — TELEPHONE ENCOUNTER
Called pt. She is still coughing and bringing up white mucous, no fever and no other symptoms. Weight loss. Pt is hestitant to have another antibiotic. Please advise.

## 2021-06-23 NOTE — TELEPHONE ENCOUNTER
I called and spoke with patient.    The patient is coming this afternoon for an office visit let us do a chest x-ray and hemogram first.  Please plan to these orders and I will see the patient also make sure that the patient has not oxygen saturation and temperature done.

## 2021-06-23 NOTE — TELEPHONE ENCOUNTER
Clinic Care Coordination Contact    Clinic Care Coordination Contact  OUTREACH    Clinical Concerns:  Current Medical Concerns:  Ongoing coughing. Has completed Z pack and has seen her PCP and used walk in clinic during past month.  Is waiting for results of blood work done last week.  She is not taking vitamins at this time due to stomach upset.            Goals:   Goals        General    Healthy Eating (pt-stated)     Notes - Note edited  10/15/2018  4:23 PM by Nuvia Pearce LSW    Goal Statement: I do not want to lose any more weight and will eat healthy.  CC to assist with support and resources.   Measure of Success: I want to weigh more than 105 pounds.   Supportive Steps to Achieve: I will eat healthy meals, low is sodium.    Barriers: Does not know why she keeps losing weight.   Strengths: Exercises and eats well. Cooks her meals.   Date to Achieve By: January 30, 2019.             Patient/Caregiver understanding: Patient lost a pound and now weighs 98 pounds.  She is sleeping ok, and trying to eat regularly.  She is weighing her options about moving to [place] with her son. She found out her health insurance allows her to be out of state for 3 months without losing insurance.  She also has a close cousin who wants her to move in with her.       Outreach Frequency: monthly    Plan: Patient to notify CC of any needs prior to next outreach in one month.    Nuvia Pearce,   [facility]  [email or web address]  [phone]

## 2021-06-23 NOTE — PROGRESS NOTES
Office Visit - Follow up    Helen Napoles   83 y.o. female    Date of Visit: 1/11/2019    Chief Complaint   Patient presents with     Cough     Dizziness       Subjective: Cough with dizziness for 1 month was on Z-Hunter finished Sunday.  Offered another course of antibiotic patient declined.  She thinks she is losing weight weight is stable 99 pounds.  She felt like her cough is disabling.  The patient denies any fever or chills no night sweats.  No hemoptysis she is a non-smoker.    Allergies hydroxyzine and pentazocine.    No blood in stool urine or sputum medication list reviewed well-tolerated normal effects denies chest pain or shortness of breath.  History of heartburn which may be contributing to cough.    ROS: A comprehensive review of systems was performed and was otherwise negative    Medications:  Prior to Admission medications    Medication Sig Start Date End Date Taking? Authorizing Provider   amLODIPine (NORVASC) 2.5 MG tablet Take 1 tablet (2.5 mg total) by mouth daily. 1/2/19  Yes Denver Rian MD   aspirin 81 MG EC tablet Take 81 mg by mouth daily.    Yes PROVIDER, HISTORICAL   citalopram (CELEXA) 10 MG tablet Take 1 tablet (10 mg total) by mouth daily. 12/31/18  Yes Denver Rain MD   omeprazole (PRILOSEC) 20 MG capsule Take 1 capsule (20 mg total) by mouth daily. 4/12/18  Yes Bry Arthur MD   betamethasone, augmented, (DIPROLENE AF) 0.05 % cream Apply sparingly bid for rash 4/12/18   Bry Arthur MD   fluticasone (FLONASE) 50 mcg/actuation nasal spray Apply 2 sprays into each nostril daily.    PROVIDER, HISTORICAL       Allergies:   Allergies   Allergen Reactions     Hydroxyzine Hcl Other (See Comments)     Extreme sedation      Pentazocine Lactate Unknown       Immunizations:   Immunization History   Administered Date(s) Administered     Pneumo Polysac 23-V 07/17/2006     Td,adult,historic,unspecified 01/30/2009       Exam Chest clear to auscultation and percussion.   Heart tones regular rhythm without murmur rub or gallop.  Abdomen soft nontender no organomegaly.  No peritoneal signs.  Extremities free of edema cyanosis or clubbing.  Neck veins nondistended no thyromegaly or scleral icterus noted, carotids full.  Skin warm and dry easily conversant good spirited.  Normal intelligence.  Neurologically intact no gross localizing findings.  Not in acute distress no central acrocyanosis noted tachypnea.  Respiratory rate 18 unlabored pulse 80 blood pressure 130/68 O2 sats 99% temperature 98.2 weight stable at 99 pounds BMI is 20.0.    Assessment and Plan  Cough with history of hypertension and acid reflux.  Check chest x-ray today plus hemogram conference of metabolic profile needs full physical examination soon.    Multiple drug allergies including hydroxyzine and pentazocine.  Ultimately if cough does not resolve despite negative workup today and in the near future must consider pulmonary medicine consultation.  Endobronchial lesion?.    Time: total time spent with the patient was 25 minutes of which >50% was spent in counseling and coordination of care        Denver Rain MD    Patient Active Problem List   Diagnosis     Osteoarthritis Of The Ankle/Foot (Multiple Joints)     Palpitations     Osteopenia     Anemia     Fatigue     Cataract     Hyperlipidemia     Hypertension     Heartburn     Osteoarthritis Of The Knee     Cervical Spondylosis     Cervicalgia     Limb Pain     Headache     Chest Pain     Anxiety     TIA (transient ischemic attack)     Hyperlipidemia

## 2021-06-23 NOTE — TELEPHONE ENCOUNTER
Who is calling:  patient  Reason for Call:  Patient calling regarding on going symptoms she has been having.  Patient stated she is still having the same issues for over 3 weeks. Patient has been to [facility] as well as see provider in clinic on 01/03/19 including doing a Z-buddy.    Patient is now wishing to speak with pcp or his staff about what to do next  Date of last appointment with primary care: 01/02/19  Has the patient been recently seen:  Yes  Okay to leave a detailed message: Yes

## 2021-06-24 NOTE — TELEPHONE ENCOUNTER
RN cannot approve Refill Request    RN can NOT refill this medication med is not covered by policy/route to provider. Last office visit: 4/12/2018 Bry Arthur MD Last Physical: Visit date not found Last MTM visit: Visit date not found Last visit same specialty: 1/11/2019 Denver Rain MD.  Next visit within 3 mo: Visit date not found  Next physical within 3 mo: Visit date not found      Trena Teresa, [facility] Connection Triage/Med Refill 3/5/2019    Requested Prescriptions   Pending Prescriptions Disp Refills     betamethasone, augmented, (DIPROLENE-AF) 0.05 % cream [Pharmacy Med Name: BETAMETHASONE DP AUG 0.05% CRM] 30 g 0     Sig: APPLY SPARINGLY TO RASH TWICE DAILY    There is no refill protocol information for this order

## 2021-06-24 NOTE — TELEPHONE ENCOUNTER
Clinic Care Coordination Contact        Goals:   Goals        General    Healthy Eating (pt-stated)     Notes - Note edited  2/25/2019  1:02 PM by Nuvia Pearce LSW    Goal Statement: I do not want to lose any more weight and will eat healthy.  CC to assist with support and resources.   Measure of Success: I want to weigh more than 105 pounds.   Supportive Steps to Achieve: I will eat healthy meals, low is sodium.    Barriers: Does not know why she keeps losing weight.   Strengths: Exercises and eats well. Cooks her meals.   Date to Achieve By: March 30, 2019.                 Patient/Caregiver understanding: Call to patient and she thinks she has gained a pound.  Is going to set up appointment to get her hearing checked.  Informed     Patient that this CC is not supporting this clinic after today.  She appreciated the calls from CC and would like to continue to have a care coordinator call her and is open to receiving a call from HE care coordinator to start services.  Having a monthly call keeps her motivated.     CC will ask PCP to order care coordination.      Plan: No further outreach by this CC.   Nuvia Pearce,   [facility]  [email or web address]  [phone]    Clinic Care Coordination Contact    Clinical Concerns:  Current Medical Concerns:  Crown fell off and she is getting it reglued tomorrow.  Found that she is intolerant of red dye in her food and has been avoiding that and gluten with good results.      Current Behavioral Concerns: Worried about her grandson who has been diagnosed with Schizophrenia. She and her daughter are educating themselves on this.  Her step grandson was murdered in [place] several weeks ago.  Trying to understand why and how that happened.        Diet/Exercise/Sleep:  Diet:: Regular    Exercise:: Yes  Days per week of moderate to strenuous exercise (like a brisk walk): 7  On average, minutes per day of exercise at this level: 30  How intense was your  typical exercise? : Light (like stretching or slow walking)  Exercise Minutes per Week: 210              Goals:   Goals        General    Healthy Eating (pt-stated)     Notes - Note edited  10/15/2018  4:23 PM by Nuvia Pearce LSW    Goal Statement: I do not want to lose any more weight and will eat healthy.  CC to assist with support and resources.   Measure of Success: I want to weigh more than 105 pounds.   Supportive Steps to Achieve: I will eat healthy meals, low is sodium.    Barriers: Does not know why she keeps losing weight.   Strengths: Exercises and eats well. Cooks her meals.   Date to Achieve By: March 30, 2019.                 Patient/Caregiver understanding: Is eating snacks and drinking water. Thinks that avoiding things that she intolerant of will help her gain weight. Enjoys living in her building and having community there. Is using the [employer] bus to get to stores and library on Wednesday.  Looking forward to walking outside when weather improves.  Is taking naps some days.      Plan: CC to call in one month to assess needs.     Nuvia Pearce,   [facility]  [email or web address]  [phone]

## 2021-06-25 NOTE — TELEPHONE ENCOUNTER
Called and spoke with daughter Diandra (who was in with patient today)     Advised as below    ----- Message from Denver Rain MD sent at 5/28/2021 12:19 PM CDT -----  Microscopic hematuria again noted and would recommend [facility] consultation for further evaluation.  Rest of labs okay.  I will send an order for urology consult.  At [facility] [phone]    Advised other results we have received so far are WNL    Letter mailed to patient home address    Daughter will call [facility] to schedule [phone]

## 2021-06-25 NOTE — TELEPHONE ENCOUNTER
Okay to increase Aricept for this patient at 10 mg daily dispense #90 take 110 mg tablet of Aricept daily.  With 5 refills.    Please call patient and pharmacy.

## 2021-06-25 NOTE — PROGRESS NOTES
"    Assessment & Plan     Dementia without behavioral disturbance today check hemogram comprehensive metabolic profile urinalysis plus TSH vitamin B12 level.    Hypertension controlled for age recheck 156/62.  Discussed widening of the pulse pressure.  Today check lipid panel.    Accompanied by her daughter who is very supportive.    Multiple drug allergies listed in the chart reviewed.    Review of external notes as documented in note  25 minutes spent on the date of the encounter doing chart review, patient visit, documentation and discussion with family        Return in about 1 year (around 5/28/2022).    Denver Rain MD  [facility]   Helen Napoles is 85 y.o. and presents today for the following health issues   HPI         Some shaking sensation with Aricept.  Finds her self difficulty with sleep things going through her mind.    Review of Systems  No blood in stool or urine no chest pain shortness of breath medication list reviewed reconciled in the chart non-smoker no excess alcohol.      Objective    /62 (Patient Site: Right Arm, Patient Position: Sitting, Cuff Size: Adult Regular)   Pulse 66   Temp 97.6  F (36.4  C) (Temporal)   Resp 20   Ht 4' 10.5\" (1.486 m)   Wt (!) 94 lb 3.2 oz (42.7 kg)   LMP 09/16/1987   SpO2 97%   BMI 19.35 kg/m    Body mass index is 19.35 kg/m .  Physical Exam  Chest clear heart tones normal neck veins nondistended no carotid bruits weight stable abdomen benign extremities free of edema neatly attired easily conversant.  Obviously demented.  No behavioral disturbance no agitation.              "

## 2021-06-25 NOTE — TELEPHONE ENCOUNTER
Dr. Koffi Miramontes patient's daughter is calling wondering if they can increase patient's dose of her aricept.  Right now she is taking 5mg at HS.  They are wondering if you can either have her take 5mg two times a day or increase the dose to 10mg at HS.  Please call Fe back at [phone]  They use [facility] pharmacy in her chart.

## 2021-06-25 NOTE — TELEPHONE ENCOUNTER
Writer sent Rx per PCP order below.  Attempted to contact Fe to inform, no answer message left to call clinic back.  Patient now will take 10 mg (1 tablet) at hs. 90 tabs with refills sent to pharmacy.    Rangel Ceja RN  [facility]

## 2021-06-26 NOTE — TELEPHONE ENCOUNTER
RN cannot approve Refill Request    RN can NOT refill this medication Change in dosage. See order written 6/15/2021.. Last office visit: 5/28/2021 Denver Rain MD Last Physical: 4/16/2019 Last MTM visit: Visit date not found Last visit same specialty: 5/28/2021 Denver Rain MD.  Next visit within 3 mo: Visit date not found  Next physical within 3 mo: Visit date not found  Message sent to pharmacy.     Little Smith, [facility] Triage/Med Refill 6/20/2021    Requested Prescriptions   Pending Prescriptions Disp Refills     donepeziL (ARICEPT) 5 MG tablet [Pharmacy Med Name: DONEPEZIL HCL 5 MG TABLET] 30 tablet 2     Sig: TAKE 1 TABLET BY MOUTH EVERYDAY AT BEDTIME       Cholinesterase Inhibitor/Anti-Alzheimer Agent Refill Protocol Passed - 6/19/2021  1:34 PM        Passed - Visit with PCP or prescribing provider visit in last 6 months or next 3 months     Last office visit with prescriber/PCP: 5/28/2021 OR same dept: 5/28/2021 Denver Rain MD OR same specialty: 5/28/2021 Denver Rain MD Last physical: Visit date not found Last MTM visit: Visit date not found     Next appt within 3 mo: Visit date not found  Next physical within 3 mo: Visit date not found  Prescriber OR PCP: Denver Rain MD  Last diagnosis associated with med order: 1. Dementia without behavioral disturbance, unspecified dementia type (H)  - donepeziL (ARICEPT) 5 MG tablet [Pharmacy Med Name: DONEPEZIL HCL 5 MG TABLET]; TAKE 1 TABLET BY MOUTH EVERYDAY AT BEDTIME  Dispense: 30 tablet; Refill: 2    If protocol passes may refill for 6 months if within 3 months of last provider visit (or a total of 9 months).

## 2021-07-02 ENCOUNTER — RECORDS - HEALTHEAST (OUTPATIENT)
Dept: ADMINISTRATIVE | Facility: OTHER | Age: 86
End: 2021-07-02

## 2021-07-03 NOTE — ADDENDUM NOTE
Addendum Note by Siri Flores LPN at 4/14/2017  2:37 PM     Author: Siri Flores LPN Service: -- Author Type: Licensed Nurse    Filed: 4/14/2017  2:37 PM Encounter Date: 4/14/2017 Status: Signed    : Siri Flores LPN (Licensed Nurse)    Addended by: SIRI FLORES on: 4/14/2017 02:37 PM        Modules accepted: Orders

## 2021-07-04 NOTE — LETTER
Letter by Denver Rain MD at      Author: Denver Rain MD Service: -- Author Type: --    Filed:  Encounter Date: 5/28/2021 Status: (Other)         Helen Napoles  [address]  [place] [address]             May 28, 2021         Dear Ms. Napoles,    Below are the results from your recent visit:    Resulted Orders   HM2(CBC w/o Differential)   Result Value Ref Range    WBC 5.0 4.0 - 11.0 thou/uL    RBC 5.18 3.80 - 5.40 mill/uL    Hemoglobin 14.0 12.0 - 16.0 g/dL    Hematocrit 44.3 35.0 - 47.0 %    MCV 86 80 - 100 fL    MCH 27.0 27.0 - 34.0 pg    MCHC 31.6 (L) 32.0 - 36.0 g/dL    RDW 12.9 11.0 - 14.5 %    Platelets 291 140 - 440 thou/uL    MPV 9.0 7.0 - 10.0 fL   Urinalysis-UC if Indicated   Result Value Ref Range    Color, UA Yellow Colorless, Yellow, Straw, Light Yellow    Clarity, UA Clear Clear    Glucose, UA Negative Negative    Protein, UA Trace (!) Negative    Bilirubin, UA Negative Negative    Urobilinogen, UA 0.2 E.U./dL 0.2 E.U./dL, 1.0 E.U./dL    pH, UA 7.0 5.0 - 8.0    Blood, UA Small (!) Negative    Ketones, UA Negative Negative    Nitrite, UA Negative Negative    Leukocytes, UA Negative Negative    Specific Gravity, UA 1.025 1.005 - 1.030    RBC, UA 3-5 (!) None Seen, 0-2 hpf    WBC, UA None Seen None Seen, 0-5 hpf    Bacteria, UA None Seen None Seen    Squam Epithel, UA 0-5 None Seen, 0-5 lpf    Narrative    UC not indicated             Microscopic hematuria again noted and would recommend [facility] consultation for further evaluation.  Rest of labs okay.  I will send an order for urology consult at [facility]. You may call to schedule [phone].           Please call with questions or contact us using [employer].    Sincerely,        Electronically signed by Denver Rain MD

## 2021-07-04 NOTE — LETTER
Letter by Denver Rain MD at      Author: Denver Rain MD Service: -- Author Type: --    Filed:  Encounter Date: 5/28/2021 Status: (Other)         Helen Napoles  [address] [place] [address]             May 28, 2021         Dear Ms. Napoles,    Below are the results from your recent visit:    Resulted Orders   HM2(CBC w/o Differential)   Result Value Ref Range    WBC 5.0 4.0 - 11.0 thou/uL    RBC 5.18 3.80 - 5.40 mill/uL    Hemoglobin 14.0 12.0 - 16.0 g/dL    Hematocrit 44.3 35.0 - 47.0 %    MCV 86 80 - 100 fL    MCH 27.0 27.0 - 34.0 pg    MCHC 31.6 (L) 32.0 - 36.0 g/dL    RDW 12.9 11.0 - 14.5 %    Platelets 291 140 - 440 thou/uL    MPV 9.0 7.0 - 10.0 fL   Comprehensive Metabolic Panel   Result Value Ref Range    Sodium 142 136 - 145 mmol/L    Potassium 4.2 3.5 - 5.0 mmol/L    Chloride 103 98 - 107 mmol/L    CO2 25 22 - 31 mmol/L    Anion Gap, Calculation 14 5 - 18 mmol/L    Glucose 114 70 - 125 mg/dL    BUN 10 8 - 28 mg/dL    Creatinine 0.88 0.60 - 1.10 mg/dL    GFR MDRD Af Amer >60 >60 mL/min/1.73m2    GFR MDRD Non Af Amer >60 >60 mL/min/1.73m2    Bilirubin, Total 1.1 (H) 0.0 - 1.0 mg/dL    Calcium 9.3 8.5 - 10.5 mg/dL    Protein, Total 7.3 6.0 - 8.0 g/dL    Albumin 4.0 3.5 - 5.0 g/dL    Alkaline Phosphatase 88 45 - 120 U/L    AST 26 0 - 40 U/L    ALT 29 0 - 45 U/L    Narrative    Fasting Glucose reference range is 70-99 mg/dL per  American Diabetes Association (ADA) guidelines.   Lipid Cascade   Result Value Ref Range    Cholesterol 225 (H) <=199 mg/dL    Triglycerides 132 <=149 mg/dL    HDL Cholesterol 74 >=50 mg/dL    LDL Calculated 125 <=129 mg/dL    Patient Fasting > 8hrs? Yes    Urinalysis-UC if Indicated   Result Value Ref Range    Color, UA Yellow Colorless, Yellow, Straw, Light Yellow    Clarity, UA Clear Clear    Glucose, UA Negative Negative    Protein, UA Trace (!) Negative    Bilirubin, UA Negative Negative    Urobilinogen, UA 0.2 E.U./dL 0.2 E.U./dL, 1.0  E.U./dL    pH, UA 7.0 5.0 - 8.0    Blood, UA Small (!) Negative    Ketones, UA Negative Negative    Nitrite, UA Negative Negative    Leukocytes, UA Negative Negative    Specific Gravity, UA 1.025 1.005 - 1.030    RBC, UA 3-5 (!) None Seen, 0-2 hpf    WBC, UA None Seen None Seen, 0-5 hpf    Bacteria, UA None Seen None Seen    Squam Epithel, UA 0-5 None Seen, 0-5 lpf    Narrative    UC not indicated       All very good results    Please call with questions or contact us using [employer].    Sincerely,        Electronically signed by Denver Rain MD

## 2021-07-06 VITALS
WEIGHT: 94.2 LBS | DIASTOLIC BLOOD PRESSURE: 62 MMHG | RESPIRATION RATE: 20 BRPM | TEMPERATURE: 97.6 F | SYSTOLIC BLOOD PRESSURE: 156 MMHG | HEART RATE: 66 BPM | BODY MASS INDEX: 18.99 KG/M2 | OXYGEN SATURATION: 97 % | HEIGHT: 59 IN

## 2021-07-13 ENCOUNTER — RECORDS - HEALTHEAST (OUTPATIENT)
Dept: ADMINISTRATIVE | Facility: CLINIC | Age: 86
End: 2021-07-13

## 2021-07-20 DIAGNOSIS — I10 ESSENTIAL (PRIMARY) HYPERTENSION: ICD-10-CM

## 2021-07-21 ENCOUNTER — RECORDS - HEALTHEAST (OUTPATIENT)
Dept: ADMINISTRATIVE | Facility: CLINIC | Age: 86
End: 2021-07-21

## 2021-07-22 ENCOUNTER — RECORDS - HEALTHEAST (OUTPATIENT)
Dept: INTERNAL MEDICINE | Facility: CLINIC | Age: 86
End: 2021-07-22

## 2021-07-22 DIAGNOSIS — Z12.31 OTHER SCREENING MAMMOGRAM: ICD-10-CM

## 2021-07-24 NOTE — TELEPHONE ENCOUNTER
"Routing refill request to provider for review/approval because:  Patient needs to be seen because it has been more than 1 year since last office visit.    Last Written Prescription Date:  1/16/21  Last Fill Quantity: ?,  # refills: ?   Last office visit provider:  5/28/21     Requested Prescriptions   Pending Prescriptions Disp Refills     amLODIPine (NORVASC) 2.5 MG tablet [Pharmacy Med Name: AMLODIPINE BESYLATE 2.5 MG TAB] 90 tablet 3     Sig: TAKE 1 TABLET BY MOUTH EVERY DAY       Calcium Channel Blockers Protocol  Failed - 7/20/2021 12:53 AM        Failed - Blood pressure under 140/90 in past 12 months     BP Readings from Last 3 Encounters:   05/28/21 (!) 156/62   04/30/21 130/70   03/16/21 138/83                 Passed - Recent (12 mo) or future (30 days) visit within the authorizing provider's specialty     Patient has had an office visit with the authorizing provider or a provider within the authorizing providers department within the previous 12 mos or has a future within next 30 days. See \"Patient Info\" tab in inbasket, or \"Choose Columns\" in Meds & Orders section of the refill encounter.              Passed - Medication is active on med list        Passed - Patient is age 18 or older        Passed - No active pregnancy on record        Passed - Normal serum creatinine on file in past 12 months     Recent Labs   Lab Test 05/28/21  1047   CR 0.88       Ok to refill medication if creatinine is low          Passed - No positive pregnancy test in past 12 months             олег razo RN 07/24/21 2:33 PM  "

## 2021-07-25 RX ORDER — AMLODIPINE BESYLATE 2.5 MG/1
TABLET ORAL
Qty: 90 TABLET | Refills: 3 | Status: SHIPPED | OUTPATIENT
Start: 2021-07-25 | End: 2024-01-01 | Stop reason: DRUGHIGH

## 2021-07-26 ENCOUNTER — TRANSFERRED RECORDS (OUTPATIENT)
Dept: HEALTH INFORMATION MANAGEMENT | Facility: CLINIC | Age: 86
End: 2021-07-26

## 2021-08-06 NOTE — PATIENT INSTRUCTIONS - HE
Patient Instructions by Denver Rain MD at 4/16/2019 10:00 AM     Author: Denver Rain MD Service: -- Author Type: Physician    Filed: 4/16/2019 10:19 AM Encounter Date: 4/16/2019 Status: Signed    : Denver Rain MD (Physician)         Patient Education   Instrumental Activities of Daily Living  Your Health Risk Assessment indicates you have difficulties with instrumental activities of daily living which include laundry, housekeeping, banking, shopping, using the telephone, food preparation, transportation, or taking your own medications. Please make a follow up appointment for us to address this issue in more detail.    [employer] has resources available on the following website: [email or web address]     Also, here is a local agency that provides help with meals and other assistance:   [facility] Line: [phone]     Patient Education   Signs of Hearing Loss  Hearing loss is a problem shared by many people. In fact, it is one of the most common health conditions, particularly as people age. Most people over age 65 have some hearing loss, and by age 80, almost everyone does. Because hearing loss usually occurs slowly over the years, you may not realize your hearing ability has gotten worse.       Have your hearing checked  Contact your [facility] care provider if you:    Have to strain to hear normal conversation.    Have to watch other peoples faces very carefully to follow what theyre saying.    Need to ask people to repeat what theyve said.    Often misunderstand what people are saying.    Turn the volume of the television or radio up so high that others complain.    Feel that people are mumbling when theyre talking to you.    Find that the effort to hear leaves you feeling tired and irritated.    Notice, when using the phone, that you hear better with 1 ear than the other.    9447-4011 [employer]. [address] [place] [address], [place] [address]. All  rights reserved. This information is not intended as a substitute for professional medical care. Always follow your healthcare professional's instructions.         Patient Education   Your Health Risk Assessment indicates you feel you are not in good emotional health.    Recreation   Recreation is not limited to sports and team events. It includes any activity that provides relaxation, interest, enjoyment, and exercise. Recreation provides an outlet for physical, mental, and social energy. It can give a sense of worth and achievement. It can help you stay healthy.    Mental Exercise and Social Involvement  Mental and emotional health is as important as physical health. Keep in touch with friends and family. Stay as active as possible. Continue to learn and challenge yourself.   Things you can do to stay mentally active are:    Learn something new, like a foreign language or musical instrument.     Play SCRABBLE or do crossword puzzles. If you cannot find people to play these games with you at home, you can play them with others on your computer through the Internet.     Join a games club--anything from card games to chess or checkers or lawn bowling.     Start a new hobby.     Go back to school.     Volunteer.     Read.     Keep up with world events.         Advance Directive  Patients advance directive was discussed and I am comfortable with the patients wishes.  Patient Education   Personalized Prevention Plan  You are due for the preventive services outlined below.  Your care team is available to assist you in scheduling these services.  If you have already completed any of these items, please share that information with your care team to update in your medical record.  Health Maintenance   Topic Date Due   ? DXA SCAN  07/17/2002   ? PNEUMOCOCCAL CONJUGATE VACCINE FOR ADULTS (PCV13 OR PREVNAR)  07/17/2002   ? INFLUENZA VACCINE RULE BASED (1) 08/01/2018   ? TD 18+ HE  01/30/2019   ? FALL RISK ASSESSMENT   01/02/2020   ? ADVANCE DIRECTIVES DISCUSSED WITH PATIENT  05/19/2020   ? PNEUMOCOCCAL POLYSACCHARIDE VACCINE AGE 65 AND OVER  Completed

## 2021-09-07 ENCOUNTER — NURSE TRIAGE (OUTPATIENT)
Dept: NURSING | Facility: CLINIC | Age: 86
End: 2021-09-07

## 2021-09-07 NOTE — TELEPHONE ENCOUNTER
In [place].  Triage nurse not licensed in the state the patient is calling from and is unable to triage.    Bp 149/87, feeling light headed starting last night.  Was checked out by paramedics. They told her to call her pcp and Ask to adjust her medication. Continuing not to feel well. Son concerned so he will have her checked out at urgent care near them now as she is not feeling well. Leaving soon.    Trena Velazquez RN  [facility] Nurse Advisor

## 2022-06-22 DIAGNOSIS — I10 ESSENTIAL HYPERTENSION: Primary | ICD-10-CM

## 2022-06-23 RX ORDER — DONEPEZIL HYDROCHLORIDE 10 MG/1
TABLET, FILM COATED ORAL
Qty: 90 TABLET | Refills: 4 | Status: SHIPPED | OUTPATIENT
Start: 2022-06-23 | End: 2023-07-30

## 2022-06-23 NOTE — TELEPHONE ENCOUNTER
"Routing refill request to provider for review/approval because:  Drug not active on patient's medication list  Patient needs to be seen because it has been more than 1 year since last office visit.          Last office visit provider:             Requested Prescriptions   Pending Prescriptions Disp Refills     donepezil (ARICEPT) 10 MG tablet [Pharmacy Med Name: DONEPEZIL HCL 10 MG TABLET] 90 tablet 4     Sig: TAKE 1 TABLET BY MOUTH EVERYDAY AT BEDTIME       Miscellaneous Dementia Agents Failed - 6/22/2022 10:04 AM        Failed - Recent (12 mo) or future (30 days) visit within the authorizing provider's specialty     Patient has had an office visit with the authorizing provider or a provider within the authorizing providers department within the previous 12 mos or has a future within next 30 days. See \"Patient Info\" tab in inbasket, or \"Choose Columns\" in Meds & Orders section of the refill encounter.              Failed - Medication is active on med list        Passed - Patient is 18 years of age or older             Trena Marquez RN 06/22/22 9:36 PM  "

## 2023-07-30 DIAGNOSIS — I10 ESSENTIAL HYPERTENSION: ICD-10-CM

## 2023-07-30 RX ORDER — DONEPEZIL HYDROCHLORIDE 10 MG/1
TABLET, FILM COATED ORAL
Qty: 90 TABLET | Refills: 3 | Status: SHIPPED | OUTPATIENT
Start: 2023-07-30 | End: 2024-01-01

## 2023-07-30 NOTE — TELEPHONE ENCOUNTER
"Routing refill request to provider for review/approval because:  Patient needs to be seen because it has been more than 1 year since last office visit.  Too soon to refill    Last Written Prescription Date:  6/23/2022  Last Fill Quantity: 90,  # refills: 4   Last office visit provider:  5/28/2021     Requested Prescriptions   Pending Prescriptions Disp Refills    donepezil (ARICEPT) 10 MG tablet [Pharmacy Med Name: DONEPEZIL HCL 10 MG TABLET] 90 tablet 3     Sig: TAKE 1 TABLET BY MOUTH EVERYDAY AT BEDTIME       Miscellaneous Dementia Agents Failed - 7/30/2023 12:58 AM        Failed - Recent (12 mo) or future (30 days) visit within the authorizing provider's specialty     Patient has had an office visit with the authorizing provider or a provider within the authorizing providers department within the previous 12 mos or has a future within next 30 days. See \"Patient Info\" tab in inbasket, or \"Choose Columns\" in Meds & Orders section of the refill encounter.              Passed - Medication is active on med list        Passed - Patient is 18 years of age or older             Trena Mason RN 07/30/23 1:09 AM  "

## 2024-01-01 ENCOUNTER — NURSING HOME VISIT (OUTPATIENT)
Dept: GERIATRICS | Facility: CLINIC | Age: 89
End: 2024-01-01
Payer: COMMERCIAL

## 2024-01-01 ENCOUNTER — TELEPHONE (OUTPATIENT)
Dept: GERIATRICS | Facility: CLINIC | Age: 89
End: 2024-01-01
Payer: COMMERCIAL

## 2024-01-01 ENCOUNTER — CLINICAL UPDATE (OUTPATIENT)
Dept: PHARMACY | Facility: CLINIC | Age: 89
End: 2024-01-01
Payer: COMMERCIAL

## 2024-01-01 ENCOUNTER — PATIENT OUTREACH (OUTPATIENT)
Dept: GERIATRIC MEDICINE | Facility: CLINIC | Age: 89
End: 2024-01-01
Payer: COMMERCIAL

## 2024-01-01 ENCOUNTER — NURSING HOME VISIT (OUTPATIENT)
Dept: GERIATRICS | Facility: CLINIC | Age: 89
End: 2024-01-01
Payer: MEDICARE

## 2024-01-01 ENCOUNTER — TRANSFERRED RECORDS (OUTPATIENT)
Dept: HEALTH INFORMATION MANAGEMENT | Facility: CLINIC | Age: 89
End: 2024-01-01

## 2024-01-01 ENCOUNTER — TRANSITIONAL CARE UNIT VISIT (OUTPATIENT)
Dept: GERIATRICS | Facility: CLINIC | Age: 89
End: 2024-01-01
Payer: COMMERCIAL

## 2024-01-01 ENCOUNTER — APPOINTMENT (OUTPATIENT)
Dept: RADIOLOGY | Facility: CLINIC | Age: 89
End: 2024-01-01
Attending: EMERGENCY MEDICINE
Payer: COMMERCIAL

## 2024-01-01 ENCOUNTER — APPOINTMENT (OUTPATIENT)
Dept: CT IMAGING | Facility: CLINIC | Age: 89
End: 2024-01-01
Attending: EMERGENCY MEDICINE
Payer: COMMERCIAL

## 2024-01-01 ENCOUNTER — HOSPITAL ENCOUNTER (OUTPATIENT)
Facility: CLINIC | Age: 89
LOS: 1 days | Discharge: NURSING, REHAB OR LONG-TERM CARE | End: 2024-02-19
Attending: EMERGENCY MEDICINE | Admitting: HOSPITALIST
Payer: COMMERCIAL

## 2024-01-01 ENCOUNTER — HOSPITAL ENCOUNTER (EMERGENCY)
Facility: CLINIC | Age: 89
Discharge: HOME | End: 2024-01-30
Attending: EMERGENCY MEDICINE | Admitting: EMERGENCY MEDICINE
Payer: COMMERCIAL

## 2024-01-01 ENCOUNTER — TELEPHONE (OUTPATIENT)
Dept: GERIATRICS | Facility: CLINIC | Age: 89
End: 2024-01-01

## 2024-01-01 ENCOUNTER — DOCUMENTATION ONLY (OUTPATIENT)
Dept: OTHER | Facility: CLINIC | Age: 89
End: 2024-01-01

## 2024-01-01 ENCOUNTER — LAB REQUISITION (OUTPATIENT)
Dept: LAB | Facility: CLINIC | Age: 89
End: 2024-01-01
Payer: COMMERCIAL

## 2024-01-01 ENCOUNTER — DOCUMENTATION ONLY (OUTPATIENT)
Dept: GERIATRICS | Facility: CLINIC | Age: 89
End: 2024-01-01
Payer: COMMERCIAL

## 2024-01-01 VITALS
DIASTOLIC BLOOD PRESSURE: 84 MMHG | WEIGHT: 83.4 LBS | HEART RATE: 89 BPM | TEMPERATURE: 98.4 F | BODY MASS INDEX: 17.51 KG/M2 | HEIGHT: 58 IN | SYSTOLIC BLOOD PRESSURE: 154 MMHG | RESPIRATION RATE: 18 BRPM | OXYGEN SATURATION: 97 %

## 2024-01-01 VITALS
DIASTOLIC BLOOD PRESSURE: 74 MMHG | OXYGEN SATURATION: 97 % | HEIGHT: 58 IN | WEIGHT: 81.4 LBS | HEART RATE: 81 BPM | RESPIRATION RATE: 18 BRPM | SYSTOLIC BLOOD PRESSURE: 110 MMHG | BODY MASS INDEX: 17.09 KG/M2 | TEMPERATURE: 96.8 F

## 2024-01-01 VITALS
WEIGHT: 78.6 LBS | BODY MASS INDEX: 16.5 KG/M2 | HEIGHT: 58 IN | RESPIRATION RATE: 18 BRPM | TEMPERATURE: 96.5 F | OXYGEN SATURATION: 97 % | DIASTOLIC BLOOD PRESSURE: 50 MMHG | SYSTOLIC BLOOD PRESSURE: 116 MMHG | HEART RATE: 70 BPM

## 2024-01-01 VITALS
DIASTOLIC BLOOD PRESSURE: 74 MMHG | RESPIRATION RATE: 20 BRPM | WEIGHT: 77 LBS | SYSTOLIC BLOOD PRESSURE: 133 MMHG | HEART RATE: 88 BPM | TEMPERATURE: 96.6 F | OXYGEN SATURATION: 97 % | BODY MASS INDEX: 16.09 KG/M2

## 2024-01-01 VITALS
HEART RATE: 58 BPM | DIASTOLIC BLOOD PRESSURE: 53 MMHG | TEMPERATURE: 97.2 F | WEIGHT: 68.2 LBS | SYSTOLIC BLOOD PRESSURE: 108 MMHG | OXYGEN SATURATION: 95 % | HEIGHT: 58 IN | RESPIRATION RATE: 18 BRPM | BODY MASS INDEX: 14.31 KG/M2

## 2024-01-01 VITALS
DIASTOLIC BLOOD PRESSURE: 67 MMHG | BODY MASS INDEX: 19.69 KG/M2 | HEART RATE: 71 BPM | RESPIRATION RATE: 18 BRPM | SYSTOLIC BLOOD PRESSURE: 139 MMHG | HEIGHT: 58 IN | TEMPERATURE: 97.3 F | OXYGEN SATURATION: 100 %

## 2024-01-01 VITALS
WEIGHT: 93 LBS | HEART RATE: 94 BPM | TEMPERATURE: 97.2 F | HEIGHT: 58 IN | DIASTOLIC BLOOD PRESSURE: 69 MMHG | SYSTOLIC BLOOD PRESSURE: 140 MMHG | RESPIRATION RATE: 16 BRPM | BODY MASS INDEX: 19.52 KG/M2 | OXYGEN SATURATION: 98 %

## 2024-01-01 VITALS
WEIGHT: 83 LBS | TEMPERATURE: 97 F | RESPIRATION RATE: 18 BRPM | SYSTOLIC BLOOD PRESSURE: 160 MMHG | DIASTOLIC BLOOD PRESSURE: 80 MMHG | HEART RATE: 89 BPM | HEIGHT: 58 IN | BODY MASS INDEX: 17.42 KG/M2 | OXYGEN SATURATION: 95 %

## 2024-01-01 VITALS
DIASTOLIC BLOOD PRESSURE: 73 MMHG | SYSTOLIC BLOOD PRESSURE: 143 MMHG | BODY MASS INDEX: 17.3 KG/M2 | TEMPERATURE: 95.3 F | HEART RATE: 89 BPM | RESPIRATION RATE: 18 BRPM | WEIGHT: 82.4 LBS | HEIGHT: 58 IN | OXYGEN SATURATION: 95 %

## 2024-01-01 VITALS
HEIGHT: 58 IN | TEMPERATURE: 96.3 F | WEIGHT: 79.8 LBS | HEART RATE: 100 BPM | SYSTOLIC BLOOD PRESSURE: 145 MMHG | DIASTOLIC BLOOD PRESSURE: 81 MMHG | RESPIRATION RATE: 18 BRPM | BODY MASS INDEX: 16.75 KG/M2 | OXYGEN SATURATION: 98 %

## 2024-01-01 DIAGNOSIS — R62.7 FAILURE TO THRIVE IN ADULT: ICD-10-CM

## 2024-01-01 DIAGNOSIS — R53.81 PHYSICAL DECONDITIONING: ICD-10-CM

## 2024-01-01 DIAGNOSIS — R52 GENERALIZED PAIN: ICD-10-CM

## 2024-01-01 DIAGNOSIS — G30.1 SEVERE LATE ONSET ALZHEIMER'S DEMENTIA WITHOUT BEHAVIORAL DISTURBANCE, PSYCHOTIC DISTURBANCE, MOOD DISTURBANCE, OR ANXIETY (H): ICD-10-CM

## 2024-01-01 DIAGNOSIS — M25.562 ACUTE PAIN OF LEFT KNEE: Primary | ICD-10-CM

## 2024-01-01 DIAGNOSIS — Z51.5 HOSPICE CARE PATIENT: ICD-10-CM

## 2024-01-01 DIAGNOSIS — K59.01 SLOW TRANSIT CONSTIPATION: ICD-10-CM

## 2024-01-01 DIAGNOSIS — R60.9 SWELLING: ICD-10-CM

## 2024-01-01 DIAGNOSIS — E46 PROTEIN-CALORIE MALNUTRITION, UNSPECIFIED SEVERITY (H): ICD-10-CM

## 2024-01-01 DIAGNOSIS — F02.C0 SEVERE LATE ONSET ALZHEIMER'S DEMENTIA WITHOUT BEHAVIORAL DISTURBANCE, PSYCHOTIC DISTURBANCE, MOOD DISTURBANCE, OR ANXIETY (H): ICD-10-CM

## 2024-01-01 DIAGNOSIS — I10 PRIMARY HYPERTENSION: ICD-10-CM

## 2024-01-01 DIAGNOSIS — M47.812 CERVICAL SPONDYLOSIS: Primary | ICD-10-CM

## 2024-01-01 DIAGNOSIS — R62.7 FAILURE TO THRIVE IN ADULT: Primary | ICD-10-CM

## 2024-01-01 DIAGNOSIS — K59.00 CONSTIPATION, UNSPECIFIED CONSTIPATION TYPE: ICD-10-CM

## 2024-01-01 DIAGNOSIS — R53.1 GENERALIZED WEAKNESS: ICD-10-CM

## 2024-01-01 DIAGNOSIS — F03.90 DEMENTIA, UNSPECIFIED DEMENTIA SEVERITY, UNSPECIFIED DEMENTIA TYPE, UNSPECIFIED WHETHER BEHAVIORAL, PSYCHOTIC, OR MOOD DISTURBANCE OR ANXIETY (H): ICD-10-CM

## 2024-01-01 DIAGNOSIS — R52 GENERALIZED PAIN: Primary | ICD-10-CM

## 2024-01-01 DIAGNOSIS — F03.90 DEMENTIA, UNSPECIFIED DEMENTIA SEVERITY, UNSPECIFIED DEMENTIA TYPE, UNSPECIFIED WHETHER BEHAVIORAL, PSYCHOTIC, OR MOOD DISTURBANCE OR ANXIETY (H): Primary | ICD-10-CM

## 2024-01-01 DIAGNOSIS — M10.9 GOUT, UNSPECIFIED: ICD-10-CM

## 2024-01-01 DIAGNOSIS — I10 PRIMARY HYPERTENSION: Primary | ICD-10-CM

## 2024-01-01 DIAGNOSIS — I10 HYPERTENSION: ICD-10-CM

## 2024-01-01 DIAGNOSIS — Z86.59 HISTORY OF DEMENTIA: ICD-10-CM

## 2024-01-01 DIAGNOSIS — R62.7 ADULT FAILURE TO THRIVE: Primary | ICD-10-CM

## 2024-01-01 DIAGNOSIS — R62.7 ADULT FAILURE TO THRIVE: ICD-10-CM

## 2024-01-01 LAB
ALBUMIN SERPL BCG-MCNC: 3.9 G/DL (ref 3.5–5.2)
ALBUMIN SERPL BCG-MCNC: 4.2 G/DL (ref 3.5–5.2)
ALBUMIN UR-MCNC: 10 MG/DL
ALBUMIN UR-MCNC: 20 MG/DL
ALP SERPL-CCNC: 116 U/L (ref 40–150)
ALP SERPL-CCNC: 86 U/L (ref 40–150)
ALT SERPL W P-5'-P-CCNC: 47 U/L (ref 0–50)
ALT SERPL W P-5'-P-CCNC: 97 U/L (ref 0–50)
ANION GAP SERPL CALCULATED.3IONS-SCNC: 10 MMOL/L (ref 7–15)
ANION GAP SERPL CALCULATED.3IONS-SCNC: 13 MMOL/L (ref 7–15)
APPEARANCE UR: CLEAR
APPEARANCE UR: CLEAR
AST SERPL W P-5'-P-CCNC: 58 U/L (ref 0–45)
AST SERPL W P-5'-P-CCNC: ABNORMAL U/L
ATRIAL RATE - MUSE: 95 BPM
ATRIAL RATE - MUSE: 96 BPM
BASOPHILS # BLD AUTO: 0 10E3/UL (ref 0–0.2)
BASOPHILS # BLD AUTO: 0 10E3/UL (ref 0–0.2)
BASOPHILS NFR BLD AUTO: 0 %
BASOPHILS NFR BLD AUTO: 1 %
BILIRUB DIRECT SERPL-MCNC: 0.29 MG/DL (ref 0–0.3)
BILIRUB SERPL-MCNC: 1.1 MG/DL
BILIRUB SERPL-MCNC: 1.1 MG/DL
BILIRUB UR QL STRIP: NEGATIVE
BILIRUB UR QL STRIP: NEGATIVE
BUN SERPL-MCNC: 12.3 MG/DL (ref 8–23)
BUN SERPL-MCNC: 19.3 MG/DL (ref 8–23)
CALCIUM SERPL-MCNC: 10.1 MG/DL (ref 8.8–10.2)
CALCIUM SERPL-MCNC: 10.4 MG/DL (ref 8.8–10.2)
CHLORIDE SERPL-SCNC: 101 MMOL/L (ref 98–107)
CHLORIDE SERPL-SCNC: 103 MMOL/L (ref 98–107)
COLOR UR AUTO: YELLOW
COLOR UR AUTO: YELLOW
CREAT SERPL-MCNC: 0.8 MG/DL (ref 0.51–0.95)
CREAT SERPL-MCNC: 0.83 MG/DL (ref 0.51–0.95)
CRP SERPL-MCNC: 89.3 MG/L
DEPRECATED HCO3 PLAS-SCNC: 25 MMOL/L (ref 22–29)
DEPRECATED HCO3 PLAS-SCNC: 28 MMOL/L (ref 22–29)
DIASTOLIC BLOOD PRESSURE - MUSE: NORMAL MMHG
DIASTOLIC BLOOD PRESSURE - MUSE: NORMAL MMHG
EGFRCR SERPLBLD CKD-EPI 2021: 68 ML/MIN/1.73M2
EGFRCR SERPLBLD CKD-EPI 2021: 71 ML/MIN/1.73M2
EOSINOPHIL # BLD AUTO: 0 10E3/UL (ref 0–0.7)
EOSINOPHIL # BLD AUTO: 0 10E3/UL (ref 0–0.7)
EOSINOPHIL NFR BLD AUTO: 0 %
EOSINOPHIL NFR BLD AUTO: 1 %
ERYTHROCYTE [DISTWIDTH] IN BLOOD BY AUTOMATED COUNT: 13.6 % (ref 10–15)
ERYTHROCYTE [DISTWIDTH] IN BLOOD BY AUTOMATED COUNT: 13.8 % (ref 10–15)
GLUCOSE SERPL-MCNC: 180 MG/DL (ref 70–99)
GLUCOSE SERPL-MCNC: 181 MG/DL (ref 70–99)
GLUCOSE UR STRIP-MCNC: NEGATIVE MG/DL
GLUCOSE UR STRIP-MCNC: NEGATIVE MG/DL
HCT VFR BLD AUTO: 39.9 % (ref 35–47)
HCT VFR BLD AUTO: 46.9 % (ref 35–47)
HGB BLD-MCNC: 12.6 G/DL (ref 11.7–15.7)
HGB BLD-MCNC: 14.6 G/DL (ref 11.7–15.7)
HGB UR QL STRIP: NEGATIVE
HGB UR QL STRIP: NEGATIVE
HOLD SPECIMEN: NORMAL
HYALINE CASTS: 1 /LPF
IMM GRANULOCYTES # BLD: 0 10E3/UL
IMM GRANULOCYTES # BLD: 0 10E3/UL
IMM GRANULOCYTES NFR BLD: 0 %
IMM GRANULOCYTES NFR BLD: 0 %
INTERPRETATION ECG - MUSE: NORMAL
INTERPRETATION ECG - MUSE: NORMAL
KETONES UR STRIP-MCNC: 10 MG/DL
KETONES UR STRIP-MCNC: 10 MG/DL
LEUKOCYTE ESTERASE UR QL STRIP: NEGATIVE
LEUKOCYTE ESTERASE UR QL STRIP: NEGATIVE
LIPASE SERPL-CCNC: 38 U/L (ref 13–60)
LYMPHOCYTES # BLD AUTO: 0.5 10E3/UL (ref 0.8–5.3)
LYMPHOCYTES # BLD AUTO: 1.5 10E3/UL (ref 0.8–5.3)
LYMPHOCYTES NFR BLD AUTO: 23 %
LYMPHOCYTES NFR BLD AUTO: 5 %
MCH RBC QN AUTO: 27.1 PG (ref 26.5–33)
MCH RBC QN AUTO: 27.4 PG (ref 26.5–33)
MCHC RBC AUTO-ENTMCNC: 31.1 G/DL (ref 31.5–36.5)
MCHC RBC AUTO-ENTMCNC: 31.6 G/DL (ref 31.5–36.5)
MCV RBC AUTO: 87 FL (ref 78–100)
MCV RBC AUTO: 87 FL (ref 78–100)
MONOCYTES # BLD AUTO: 0.3 10E3/UL (ref 0–1.3)
MONOCYTES # BLD AUTO: 0.4 10E3/UL (ref 0–1.3)
MONOCYTES NFR BLD AUTO: 3 %
MONOCYTES NFR BLD AUTO: 7 %
MUCOUS THREADS #/AREA URNS LPF: PRESENT /LPF
MUCOUS THREADS #/AREA URNS LPF: PRESENT /LPF
NEUTROPHILS # BLD AUTO: 4.4 10E3/UL (ref 1.6–8.3)
NEUTROPHILS # BLD AUTO: 8.5 10E3/UL (ref 1.6–8.3)
NEUTROPHILS NFR BLD AUTO: 68 %
NEUTROPHILS NFR BLD AUTO: 92 %
NITRATE UR QL: NEGATIVE
NITRATE UR QL: NEGATIVE
NRBC # BLD AUTO: 0 10E3/UL
NRBC # BLD AUTO: 0 10E3/UL
NRBC BLD AUTO-RTO: 0 /100
NRBC BLD AUTO-RTO: 0 /100
P AXIS - MUSE: 56 DEGREES
P AXIS - MUSE: 74 DEGREES
PH UR STRIP: 6 [PH] (ref 5–7)
PH UR STRIP: 6 [PH] (ref 5–7)
PLATELET # BLD AUTO: 292 10E3/UL (ref 150–450)
PLATELET # BLD AUTO: 316 10E3/UL (ref 150–450)
POTASSIUM SERPL-SCNC: 3.9 MMOL/L (ref 3.4–5.3)
POTASSIUM SERPL-SCNC: 4.8 MMOL/L (ref 3.4–5.3)
PR INTERVAL - MUSE: 144 MS
PR INTERVAL - MUSE: 152 MS
PROCALCITONIN SERPL IA-MCNC: 0.12 NG/ML
PROT SERPL-MCNC: 7.2 G/DL (ref 6.4–8.3)
PROT SERPL-MCNC: 7.6 G/DL (ref 6.4–8.3)
QRS DURATION - MUSE: 64 MS
QRS DURATION - MUSE: 66 MS
QT - MUSE: 344 MS
QT - MUSE: 346 MS
QTC - MUSE: 432 MS
QTC - MUSE: 437 MS
R AXIS - MUSE: -25 DEGREES
R AXIS - MUSE: -48 DEGREES
RBC # BLD AUTO: 4.6 10E6/UL (ref 3.8–5.2)
RBC # BLD AUTO: 5.39 10E6/UL (ref 3.8–5.2)
RBC URINE: 1 /HPF
RBC URINE: 10 /HPF
SODIUM SERPL-SCNC: 139 MMOL/L (ref 135–145)
SODIUM SERPL-SCNC: 141 MMOL/L (ref 135–145)
SP GR UR STRIP: 1.01 (ref 1–1.03)
SP GR UR STRIP: 1.02 (ref 1–1.03)
SQUAMOUS EPITHELIAL: 2 /HPF
SQUAMOUS EPITHELIAL: 3 /HPF
SYSTOLIC BLOOD PRESSURE - MUSE: NORMAL MMHG
SYSTOLIC BLOOD PRESSURE - MUSE: NORMAL MMHG
T AXIS - MUSE: 61 DEGREES
T AXIS - MUSE: 68 DEGREES
TROPONIN T SERPL HS-MCNC: 32 NG/L
TROPONIN T SERPL HS-MCNC: 33 NG/L
TROPONIN T SERPL HS-MCNC: 33 NG/L
URATE SERPL-MCNC: 3.5 MG/DL (ref 2.4–5.7)
UROBILINOGEN UR STRIP-MCNC: 2 MG/DL
UROBILINOGEN UR STRIP-MCNC: <2 MG/DL
VENTRICULAR RATE- MUSE: 95 BPM
VENTRICULAR RATE- MUSE: 96 BPM
WBC # BLD AUTO: 6.4 10E3/UL (ref 4–11)
WBC # BLD AUTO: 9.3 10E3/UL (ref 4–11)
WBC URINE: 5 /HPF
WBC URINE: 5 /HPF

## 2024-01-01 PROCEDURE — 85025 COMPLETE CBC W/AUTO DIFF WBC: CPT | Performed by: EMERGENCY MEDICINE

## 2024-01-01 PROCEDURE — 99207 PR NO CHARGE LOS: CPT | Performed by: PHARMACIST

## 2024-01-01 PROCEDURE — 82247 BILIRUBIN TOTAL: CPT | Performed by: EMERGENCY MEDICINE

## 2024-01-01 PROCEDURE — 70450 CT HEAD/BRAIN W/O DYE: CPT

## 2024-01-01 PROCEDURE — 96360 HYDRATION IV INFUSION INIT: CPT | Mod: 59

## 2024-01-01 PROCEDURE — 99308 SBSQ NF CARE LOW MDM 20: CPT | Performed by: NURSE PRACTITIONER

## 2024-01-01 PROCEDURE — 99285 EMERGENCY DEPT VISIT HI MDM: CPT | Mod: 25

## 2024-01-01 PROCEDURE — 101N000002 HC CUSTODIAL CARE DAILY

## 2024-01-01 PROCEDURE — 99238 HOSP IP/OBS DSCHRG MGMT 30/<: CPT | Performed by: FAMILY MEDICINE

## 2024-01-01 PROCEDURE — P9604 ONE-WAY ALLOW PRORATED TRIP: HCPCS | Mod: ORL | Performed by: INTERNAL MEDICINE

## 2024-01-01 PROCEDURE — 99309 SBSQ NF CARE MODERATE MDM 30: CPT | Performed by: NURSE PRACTITIONER

## 2024-01-01 PROCEDURE — 250N000013 HC RX MED GY IP 250 OP 250 PS 637: Performed by: INTERNAL MEDICINE

## 2024-01-01 PROCEDURE — 93005 ELECTROCARDIOGRAM TRACING: CPT | Performed by: EMERGENCY MEDICINE

## 2024-01-01 PROCEDURE — 80053 COMPREHEN METABOLIC PANEL: CPT | Performed by: EMERGENCY MEDICINE

## 2024-01-01 PROCEDURE — 83690 ASSAY OF LIPASE: CPT | Performed by: EMERGENCY MEDICINE

## 2024-01-01 PROCEDURE — 84550 ASSAY OF BLOOD/URIC ACID: CPT | Mod: ORL | Performed by: INTERNAL MEDICINE

## 2024-01-01 PROCEDURE — 99222 1ST HOSP IP/OBS MODERATE 55: CPT | Performed by: HOSPITALIST

## 2024-01-01 PROCEDURE — 74177 CT ABD & PELVIS W/CONTRAST: CPT

## 2024-01-01 PROCEDURE — 86140 C-REACTIVE PROTEIN: CPT | Mod: ORL | Performed by: INTERNAL MEDICINE

## 2024-01-01 PROCEDURE — 36415 COLL VENOUS BLD VENIPUNCTURE: CPT | Performed by: EMERGENCY MEDICINE

## 2024-01-01 PROCEDURE — 81003 URINALYSIS AUTO W/O SCOPE: CPT | Performed by: EMERGENCY MEDICINE

## 2024-01-01 PROCEDURE — 250N000013 HC RX MED GY IP 250 OP 250 PS 637: Performed by: EMERGENCY MEDICINE

## 2024-01-01 PROCEDURE — 250N000011 HC RX IP 250 OP 636: Performed by: EMERGENCY MEDICINE

## 2024-01-01 PROCEDURE — 36415 COLL VENOUS BLD VENIPUNCTURE: CPT | Mod: ORL | Performed by: INTERNAL MEDICINE

## 2024-01-01 PROCEDURE — 99308 SBSQ NF CARE LOW MDM 20: CPT | Mod: GV | Performed by: INTERNAL MEDICINE

## 2024-01-01 PROCEDURE — 258N000003 HC RX IP 258 OP 636: Performed by: EMERGENCY MEDICINE

## 2024-01-01 PROCEDURE — 84145 PROCALCITONIN (PCT): CPT | Performed by: EMERGENCY MEDICINE

## 2024-01-01 PROCEDURE — 99232 SBSQ HOSP IP/OBS MODERATE 35: CPT | Performed by: INTERNAL MEDICINE

## 2024-01-01 PROCEDURE — 71045 X-RAY EXAM CHEST 1 VIEW: CPT

## 2024-01-01 PROCEDURE — 96361 HYDRATE IV INFUSION ADD-ON: CPT

## 2024-01-01 PROCEDURE — 99304 1ST NF CARE SF/LOW MDM 25: CPT | Performed by: INTERNAL MEDICINE

## 2024-01-01 PROCEDURE — 81001 URINALYSIS AUTO W/SCOPE: CPT | Performed by: EMERGENCY MEDICINE

## 2024-01-01 PROCEDURE — 84484 ASSAY OF TROPONIN QUANT: CPT | Performed by: EMERGENCY MEDICINE

## 2024-01-01 PROCEDURE — 99231 SBSQ HOSP IP/OBS SF/LOW 25: CPT | Performed by: INTERNAL MEDICINE

## 2024-01-01 PROCEDURE — 99309 SBSQ NF CARE MODERATE MDM 30: CPT | Performed by: INTERNAL MEDICINE

## 2024-01-01 PROCEDURE — 84155 ASSAY OF PROTEIN SERUM: CPT | Performed by: EMERGENCY MEDICINE

## 2024-01-01 RX ORDER — ACETAMINOPHEN 500 MG
1000 TABLET ORAL 3 TIMES DAILY
COMMUNITY

## 2024-01-01 RX ORDER — DONEPEZIL HYDROCHLORIDE 10 MG/1
10 TABLET, FILM COATED ORAL AT BEDTIME
COMMUNITY
End: 2024-01-01

## 2024-01-01 RX ORDER — MEMANTINE HYDROCHLORIDE 10 MG/1
10 TABLET ORAL DAILY
COMMUNITY
End: 2024-01-01

## 2024-01-01 RX ORDER — AMLODIPINE BESYLATE 2.5 MG/1
2.5 TABLET ORAL DAILY
Status: DISCONTINUED | OUTPATIENT
Start: 2024-01-01 | End: 2024-01-01 | Stop reason: HOSPADM

## 2024-01-01 RX ORDER — SERTRALINE HYDROCHLORIDE 25 MG/1
25 TABLET, FILM COATED ORAL DAILY
COMMUNITY
End: 2024-01-01

## 2024-01-01 RX ORDER — AMOXICILLIN 250 MG
1 CAPSULE ORAL 2 TIMES DAILY
Start: 2024-01-01 | End: 2024-01-01

## 2024-01-01 RX ORDER — ONDANSETRON 2 MG/ML
4 INJECTION INTRAMUSCULAR; INTRAVENOUS EVERY 6 HOURS PRN
Status: DISCONTINUED | OUTPATIENT
Start: 2024-01-01 | End: 2024-01-01 | Stop reason: HOSPADM

## 2024-01-01 RX ORDER — AMOXICILLIN 250 MG
1 CAPSULE ORAL 2 TIMES DAILY PRN
Status: SHIPPED
Start: 2024-01-01

## 2024-01-01 RX ORDER — GUAIFENESIN/DEXTROMETHORPHAN 100-10MG/5
10 SYRUP ORAL EVERY 4 HOURS PRN
Status: DISCONTINUED | OUTPATIENT
Start: 2024-01-01 | End: 2024-01-01 | Stop reason: HOSPADM

## 2024-01-01 RX ORDER — PROCHLORPERAZINE MALEATE 5 MG
5 TABLET ORAL EVERY 6 HOURS PRN
Status: DISCONTINUED | OUTPATIENT
Start: 2024-01-01 | End: 2024-01-01 | Stop reason: HOSPADM

## 2024-01-01 RX ORDER — ACETAMINOPHEN 325 MG/1
650 TABLET ORAL 3 TIMES DAILY
Status: SHIPPED
Start: 2024-01-01 | End: 2024-01-01

## 2024-01-01 RX ORDER — PREDNISONE 20 MG/1
40 TABLET ORAL DAILY
Status: SHIPPED
Start: 2024-01-01 | End: 2024-01-01

## 2024-01-01 RX ORDER — BISACODYL 10 MG
10 SUPPOSITORY, RECTAL RECTAL DAILY PRN
Status: DISCONTINUED | OUTPATIENT
Start: 2024-01-01 | End: 2024-01-01 | Stop reason: HOSPADM

## 2024-01-01 RX ORDER — FLUTICASONE PROPIONATE 50 MCG
2 SPRAY, SUSPENSION (ML) NASAL DAILY
Status: DISCONTINUED | OUTPATIENT
Start: 2024-01-01 | End: 2024-01-01

## 2024-01-01 RX ORDER — POLYETHYLENE GLYCOL 3350 17 G/17G
17 POWDER, FOR SOLUTION ORAL DAILY PRN
Status: DISCONTINUED | OUTPATIENT
Start: 2024-01-01 | End: 2024-01-01 | Stop reason: HOSPADM

## 2024-01-01 RX ORDER — AMOXICILLIN 250 MG
1 CAPSULE ORAL 2 TIMES DAILY
Status: DISCONTINUED | OUTPATIENT
Start: 2024-01-01 | End: 2024-01-01 | Stop reason: HOSPADM

## 2024-01-01 RX ORDER — ASPIRIN 81 MG/1
81 TABLET ORAL DAILY
Status: DISCONTINUED | OUTPATIENT
Start: 2024-01-01 | End: 2024-01-01

## 2024-01-01 RX ORDER — LIDOCAINE 40 MG/G
CREAM TOPICAL
Status: DISCONTINUED | OUTPATIENT
Start: 2024-01-01 | End: 2024-01-01 | Stop reason: HOSPADM

## 2024-01-01 RX ORDER — MORPHINE SULFATE 20 MG/5ML
5 SOLUTION ORAL
COMMUNITY

## 2024-01-01 RX ORDER — ONDANSETRON 4 MG/1
4 TABLET, ORALLY DISINTEGRATING ORAL EVERY 6 HOURS PRN
Status: DISCONTINUED | OUTPATIENT
Start: 2024-01-01 | End: 2024-01-01 | Stop reason: HOSPADM

## 2024-01-01 RX ORDER — ACETAMINOPHEN 325 MG/1
650 TABLET ORAL EVERY 4 HOURS PRN
Start: 2024-01-01 | End: 2024-01-01

## 2024-01-01 RX ORDER — ACETAMINOPHEN 650 MG/1
650 SUPPOSITORY RECTAL DAILY PRN
COMMUNITY

## 2024-01-01 RX ORDER — IOPAMIDOL 755 MG/ML
90 INJECTION, SOLUTION INTRAVASCULAR ONCE
Status: DISCONTINUED | OUTPATIENT
Start: 2024-01-01 | End: 2024-01-01

## 2024-01-01 RX ORDER — CITALOPRAM HYDROBROMIDE 10 MG/1
10 TABLET ORAL DAILY
Status: DISCONTINUED | OUTPATIENT
Start: 2024-01-01 | End: 2024-01-01

## 2024-01-01 RX ORDER — ACETAMINOPHEN 650 MG/1
650 SUPPOSITORY RECTAL EVERY 4 HOURS PRN
Status: DISCONTINUED | OUTPATIENT
Start: 2024-01-01 | End: 2024-01-01 | Stop reason: HOSPADM

## 2024-01-01 RX ORDER — ACETAMINOPHEN 325 MG/1
650 TABLET ORAL EVERY 4 HOURS PRN
Status: DISCONTINUED | OUTPATIENT
Start: 2024-01-01 | End: 2024-01-01 | Stop reason: HOSPADM

## 2024-01-01 RX ORDER — IOPAMIDOL 755 MG/ML
80 INJECTION, SOLUTION INTRAVASCULAR ONCE
Status: COMPLETED | OUTPATIENT
Start: 2024-01-01 | End: 2024-01-01

## 2024-01-01 RX ORDER — POLYETHYLENE GLYCOL 3350 17 G/17G
17 POWDER, FOR SOLUTION ORAL DAILY
Start: 2024-01-01 | End: 2024-01-01

## 2024-01-01 RX ORDER — AMOXICILLIN 250 MG
1 CAPSULE ORAL 2 TIMES DAILY PRN
Status: DISCONTINUED | OUTPATIENT
Start: 2024-01-01 | End: 2024-01-01 | Stop reason: HOSPADM

## 2024-01-01 RX ORDER — PROCHLORPERAZINE 25 MG
12.5 SUPPOSITORY, RECTAL RECTAL EVERY 12 HOURS PRN
Status: DISCONTINUED | OUTPATIENT
Start: 2024-01-01 | End: 2024-01-01 | Stop reason: HOSPADM

## 2024-01-01 RX ORDER — AMLODIPINE BESYLATE 5 MG/1
2.5 TABLET ORAL DAILY
Status: ON HOLD | COMMUNITY
End: 2024-01-01

## 2024-01-01 RX ORDER — CALCIUM CARBONATE 500 MG/1
1000 TABLET, CHEWABLE ORAL 4 TIMES DAILY PRN
Status: DISCONTINUED | OUTPATIENT
Start: 2024-01-01 | End: 2024-01-01 | Stop reason: HOSPADM

## 2024-01-01 RX ORDER — IOPAMIDOL 755 MG/ML
90 INJECTION, SOLUTION INTRAVASCULAR ONCE
Status: COMPLETED | OUTPATIENT
Start: 2024-01-01 | End: 2024-01-01

## 2024-01-01 RX ORDER — BISACODYL 10 MG
10 SUPPOSITORY, RECTAL RECTAL DAILY PRN
COMMUNITY

## 2024-01-01 RX ORDER — AMOXICILLIN 250 MG
2 CAPSULE ORAL 2 TIMES DAILY PRN
Status: DISCONTINUED | OUTPATIENT
Start: 2024-01-01 | End: 2024-01-01 | Stop reason: HOSPADM

## 2024-01-01 RX ADMIN — SENNOSIDES AND DOCUSATE SODIUM 1 TABLET: 8.6; 5 TABLET ORAL at 19:53

## 2024-01-01 RX ADMIN — IOPAMIDOL 90 ML: 755 INJECTION, SOLUTION INTRAVENOUS at 21:23

## 2024-01-01 RX ADMIN — AMLODIPINE BESYLATE 2.5 MG: 2.5 TABLET ORAL at 08:24

## 2024-01-01 RX ADMIN — SENNOSIDES AND DOCUSATE SODIUM 1 TABLET: 8.6; 5 TABLET ORAL at 08:24

## 2024-01-01 RX ADMIN — SENNOSIDES AND DOCUSATE SODIUM 1 TABLET: 8.6; 5 TABLET ORAL at 07:38

## 2024-01-01 RX ADMIN — SODIUM CHLORIDE 500 ML: 9 INJECTION, SOLUTION INTRAVENOUS at 02:24

## 2024-01-01 RX ADMIN — AMLODIPINE BESYLATE 2.5 MG: 2.5 TABLET ORAL at 07:39

## 2024-01-01 RX ADMIN — IOPAMIDOL 80 ML: 755 INJECTION, SOLUTION INTRAVENOUS at 02:11

## 2024-01-01 RX ADMIN — SIMETHICONE 226 ML: 125 CAPSULE, LIQUID FILLED ORAL at 03:41

## 2024-01-01 RX ADMIN — SENNOSIDES AND DOCUSATE SODIUM 1 TABLET: 8.6; 5 TABLET ORAL at 19:35

## 2024-01-01 RX ADMIN — AMLODIPINE BESYLATE 2.5 MG: 2.5 TABLET ORAL at 07:41

## 2024-01-01 RX ADMIN — SENNOSIDES AND DOCUSATE SODIUM 1 TABLET: 8.6; 5 TABLET ORAL at 07:41

## 2024-01-01 ASSESSMENT — ACTIVITIES OF DAILY LIVING (ADL)
ADLS_ACUITY_SCORE: 59
TOILETING: 2-->COMPLETELY DEPENDENT
ADLS_ACUITY_SCORE: 59
ADLS_ACUITY_SCORE: 35
ADLS_ACUITY_SCORE: 57
DEPENDENT_IADLS:: CLEANING;COOKING;LAUNDRY;SHOPPING;MEAL PREPARATION;MEDICATION MANAGEMENT;TRANSPORTATION;INCONTINENCE;MONEY MANAGEMENT
ADLS_ACUITY_SCORE: 59
TOILETING: 2-->COMPLETELY DEPENDENT (NOT DEVELOPMENTALLY APPROPRIATE)
ADLS_ACUITY_SCORE: 59
ADLS_ACUITY_SCORE: 35
ADLS_ACUITY_SCORE: 59
ADLS_ACUITY_SCORE: 59
ADLS_ACUITY_SCORE: 61
ADLS_ACUITY_SCORE: 59
FALL_HISTORY_WITHIN_LAST_SIX_MONTHS: NO
ADLS_ACUITY_SCORE: 55
ADLS_ACUITY_SCORE: 61
ADLS_ACUITY_SCORE: 59
ADLS_ACUITY_SCORE: 57
ADLS_ACUITY_SCORE: 57
ADLS_ACUITY_SCORE: 61
ADLS_ACUITY_SCORE: 61
ADLS_ACUITY_SCORE: 59
ADLS_ACUITY_SCORE: 61
ADLS_ACUITY_SCORE: 35
ADLS_ACUITY_SCORE: 57
ADLS_ACUITY_SCORE: 35
ADLS_ACUITY_SCORE: 59
ADLS_ACUITY_SCORE: 59
ADLS_ACUITY_SCORE: 37
ADLS_ACUITY_SCORE: 35
ADLS_ACUITY_SCORE: 59
ADLS_ACUITY_SCORE: 37
ADLS_ACUITY_SCORE: 61
ADLS_ACUITY_SCORE: 61
ADLS_ACUITY_SCORE: 57
ADLS_ACUITY_SCORE: 57
ADLS_ACUITY_SCORE: 55
ADLS_ACUITY_SCORE: 57
ADLS_ACUITY_SCORE: 61
ADLS_ACUITY_SCORE: 57
ADLS_ACUITY_SCORE: 57
ADLS_ACUITY_SCORE: 61

## 2024-01-30 NOTE — ED PROVIDER NOTES
EMERGENCY DEPARTMENT ENCOUNTER      NAME: Helen Napoles  AGE: 88 year old female  YOB: 1935  MRN: [id number]  EVALUATION DATE & TIME: No admission date for patient encounter.    PCP: Denver Rain    ED PROVIDER: Bry Velazquez D.O.      Chief Complaint   Patient presents with    Altered Mental Status       FINAL IMPRESSION:  1. Generalized weakness        ED COURSE & MEDICAL DECISION MAKIN:06 PM I met with the patient to gather history and to perform my initial exam. I discussed the plan for care while in the Emergency Department.  10:26 PM I updated patient on results. We discussed plans for discharge including supportive cares, symptomatic treatment, outpatient follow up, and reasons to return to the emergency department.      ED Course as of 24 Head CT w/o contrast       Pertinent Labs & Imaging studies reviewed. (See chart for details)  88 year old female presents to the Emergency Department for evaluation of generalized weakness.  POA does feel that she has some slight increase in her confusion from baseline as well.  Initial differential did include cardiac etiology versus infection versus intracranial process versus other acute process.  She is not show any focal deficits that would be suggestive of CVA.  UA did not show any evidence of UTI, and there was no additional evidence of infection on this patient she had no elevated white count, no fever, no other focal findings.  EKG did not show any evidence of ischemia or arrhythmia, 2 troponins were both negative, do not believe this to represent ACS.  Lab testing was otherwise largely unremarkable on this patient, other than very minimally elevated calcium, very minimally elevated glucose, and minimally elevated AST.  At this time I do not find any obvious emergent process, and believe this patient would be appropriate for discharge home into the care of the patient's family.  They were  comfortable with the plan for discharge and will return to the emergency department if she has worsening symptoms or any other concern.  She otherwise follow-up with her primary care provider.  Return precautions discussed.    Medical Decision Making  Obtained supplemental history:Supplemental history obtained?: Guardian  Reviewed external records: External records reviewed?: No  Care impacted by chronic illness:Dementia, Hyperlipidemia, and Hypertension  Care significantly affected by social determinants of health:N/A  Did you consider but not order tests?: Work up considered but not performed and documented in chart, if applicable  Did you interpret images independently?: Independent interpretation of ECG and images noted in documentation, when applicable.  Consultation discussion with other provider:Did you involve another provider (consultant, , pharmacy, etc.)?: No  Discharge. No recommendations on prescription strength medication(s). See documentation for any additional details.    At the conclusion of the encounter I discussed the results of all of the tests and the disposition. The questions were answered. The patient or family acknowledged understanding and was agreeable with the care plan.        HPI    Patient information was obtained from: Patient, health care representive    Use of : N/A        Helen Napoles is a 88 year old female who presents to the emergency department via wheelchair for evaluation of increased confusion and decreased appetite.    Per health care representative, patient has had increased weakness, confusion, and a decreased appetite within the last 3 weeks. She has a history of dementia. She lives with her family who mainly cares for her. They have been trying to give her ensure and Pedialyte but reports she is gradually getting weaker and refuses to eat. Per representative, she visited the patient and noticed that the patient smelled of urine earlier today. She  also notes that the patient has not had a bowel movement in 2 weeks. Denies fevers, cough, sore throat, abdominal pain, and any other symptoms.    Per patient, she denies any pain or headaches.      REVIEW OF SYSTEMS  Constitutional:  Denies fever, chills, Positive for weakness, weight loss, decreased appetite  Eyes:  No pain, discharge, redness  HENT:  Denies sore throat, ear pain, congestion  Respiratory: No SOB, wheeze or cough  Cardiovascular:  No CP, palpitations  GI:  Denies abdominal pain, nausea, vomiting, diarrhea  : Denies dysuria, hematuria  Musculoskeletal:  Denies any new muscle/joint pain, swelling or loss of function.  Skin:  Denies rash, pallor  Neurologic:  Denies headache, focal weakness or sensory changes  Lymph: Denies swollen nodes    All other systems negative unless noted in HPI.    PAST MEDICAL HISTORY:  Past Medical History:   Diagnosis Date    Anxiety     Benign essential HTN     Cataract     Cerebral vascular accident (H)     tia    Cervical spondylosis     Cervicalgia     Chest pain     Family history of myocardial infarction     Mom 58    Fatigue     Headache     High cholesterol     HLD (hyperlipidemia)     Limb pain     Osteoarthritis     Osteoarthritis     Osteopenia     Palpitations        PAST SURGICAL HISTORY:  Past Surgical History:   Procedure Laterality Date    OOPHORECTOMY      one ovary    ZZC TOTAL ABDOM HYSTERECTOMY      Description: Hysterectomy;  Recorded: 08/02/2007;         CURRENT MEDICATIONS:    No current facility-administered medications for this encounter.     Current Outpatient Medications   Medication    amLODIPine (NORVASC) 2.5 MG tablet    aspirin (ASA) 81 MG EC tablet    citalopram (CELEXA) 10 MG tablet    fluticasone (FLONASE) 50 MCG/ACT nasal spray    omeprazole (PRILOSEC) 20 MG DR capsule         ALLERGIES:  Allergies   Allergen Reactions    Hydroxyzine Other (See Comments)     Extreme sedation     Pentazocine Unknown       FAMILY HISTORY:  Family History  "  Problem Relation Age of Onset    Cancer Mother 60.00        Lung    Heart Disease Mother     Cerebrovascular Disease Father     Ovarian Cancer Maternal Aunt 22.00       SOCIAL HISTORY:  Social History     Socioeconomic History    Marital status:     Number of children: 0   Tobacco Use    Smoking status: Never    Smokeless tobacco: Never   Substance and Sexual Activity    Alcohol use: No    Drug use: No   Social History Narrative    , no children.        VITALS:  Patient Vitals for the past 24 hrs:   BP Temp Temp src Pulse Resp SpO2 Height   01/30/24 2202 139/67 -- -- 71 -- 100 % --   01/30/24 2124 -- -- -- 74 -- 97 % --   01/30/24 2059 -- -- -- 69 -- 99 % --   01/30/24 1557 107/59 97.3  F (36.3  C) Temporal 95 18 97 % 1.473 m (4' 10\")       PHYSICAL EXAM    VITAL SIGNS: /67   Pulse 71   Temp 97.3  F (36.3  C) (Temporal)   Resp 18   Ht 1.473 m (4' 10\")   SpO2 100%   BMI 19.69 kg/m      General Appearance: Well-appearing, no acute distress Cachectic   Head:  Normocephalic, without obvious abnormality, atraumatic  Eyes:  PERRL, conjunctiva/corneas clear, EOM's intact,  ENT:  Lips, mucosa, and tongue normal, membranes are moist without pallor  Neck:  Normal ROM, symmetrical, trachea midline    Chest:  No tenderness or deformity, no crepitus  Cardio:  Regular rate and rhythm, no murmur, rub or gallop, 2+ pulses symmetric in all extremities  Pulm:  Clear to auscultation bilaterally, respirations unlabored,  Back:  ROM normal, no CVA tenderness, no spinal tenderness, no paraspinal tenderness  Abdomen:  Soft, non-tender, no rebound or guarding.  Musculoskeletal: Full ROM, no edema, no cyanosis, good ROM of major joints  Integument:  Warm, Dry, No erythema, No rash.    Neurologic:  Alert & confused.  No focal deficits appreciated.  Ambulatory.  Psychiatric:  Affect normal, Judgment normal, Mood normal.      LABS  Results for orders placed or performed during the hospital encounter of 01/30/24 " (from the past 24 hour(s))   ECG 12-LEAD WITH MUSE (LHE)   Result Value Ref Range    Systolic Blood Pressure  mmHg    Diastolic Blood Pressure  mmHg    Ventricular Rate 96 BPM    Atrial Rate 96 BPM    TX Interval 152 ms    QRS Duration 64 ms     ms    QTc 437 ms    P Axis 74 degrees    R AXIS -48 degrees    T Axis 61 degrees    Interpretation ECG       Sinus rhythm  Low voltage QRS  Left anterior fascicular block  Cannot rule out Inferior infarct (masked by fascicular block?) , age undetermined  Cannot rule out Anteroseptal infarct (cited on or before 11-AUG-2020)  Abnormal ECG  When compared with ECG of 10-MAR-2021 21:35,  Premature atrial complexes are no longer Present  QRS axis Shifted left  Minimal criteria for Inferior infarct are now Present  Questionable change in initial forces of Anterior leads  Confirmed by SEE ED PROVIDER NOTE FOR, ECG INTERPRETATION ([id number]),  Maria M Mccloud ([id number]) on 1/30/2024 7:52:05 PM     UA with Microscopic reflex to Culture    Specimen: Urine, Midstream   Result Value Ref Range    Color Urine Yellow Colorless, Straw, Light Yellow, Yellow    Appearance Urine Clear Clear    Glucose Urine Negative Negative mg/dL    Bilirubin Urine Negative Negative    Ketones Urine 10 (A) Negative mg/dL    Specific Gravity Urine 1.016 1.001 - 1.030    Blood Urine Negative Negative    pH Urine 6.0 5.0 - 7.0    Protein Albumin Urine 10 (A) Negative mg/dL    Urobilinogen Urine 2.0 (A) <2.0 mg/dL    Nitrite Urine Negative Negative    Leukocyte Esterase Urine Negative Negative    Mucus Urine Present (A) None Seen /LPF    RBC Urine 1 <=2 /HPF    WBC Urine 5 <=5 /HPF    Squamous Epithelials Urine 2 (H) <=1 /HPF    Narrative    Urine Culture not indicated   CBC with platelets + differential    Narrative    The following orders were created for panel order CBC with platelets + differential.  Procedure                               Abnormality         Status                     ---------                                -----------         ------                     CBC with platelets and d...[[id number]]  Abnormal            Final result                 Please view results for these tests on the individual orders.   Basic metabolic panel   Result Value Ref Range    Sodium 139 135 - 145 mmol/L    Potassium 3.9 3.4 - 5.3 mmol/L    Chloride 101 98 - 107 mmol/L    Carbon Dioxide (CO2) 28 22 - 29 mmol/L    Anion Gap 10 7 - 15 mmol/L    Urea Nitrogen 12.3 8.0 - 23.0 mg/dL    Creatinine 0.83 0.51 - 0.95 mg/dL    GFR Estimate 68 >60 mL/min/1.73m2    Calcium 10.4 (H) 8.8 - 10.2 mg/dL    Glucose 180 (H) 70 - 99 mg/dL   Troponin T, High Sensitivity (now)   Result Value Ref Range    Troponin T, High Sensitivity 33 (H) <=14 ng/L   Hepatic function panel   Result Value Ref Range    Protein Total 7.6 6.4 - 8.3 g/dL    Albumin 4.2 3.5 - 5.2 g/dL    Bilirubin Total 1.1 <=1.2 mg/dL    Alkaline Phosphatase 86 40 - 150 U/L    AST 58 (H) 0 - 45 U/L    ALT 47 0 - 50 U/L    Bilirubin Direct 0.29 0.00 - 0.30 mg/dL   CBC with platelets and differential   Result Value Ref Range    WBC Count 6.4 4.0 - 11.0 10e3/uL    RBC Count 5.39 (H) 3.80 - 5.20 10e6/uL    Hemoglobin 14.6 11.7 - 15.7 g/dL    Hematocrit 46.9 35.0 - 47.0 %    MCV 87 78 - 100 fL    MCH 27.1 26.5 - 33.0 pg    MCHC 31.1 (L) 31.5 - 36.5 g/dL    RDW 13.6 10.0 - 15.0 %    Platelet Count 316 150 - 450 10e3/uL    % Neutrophils 68 %    % Lymphocytes 23 %    % Monocytes 7 %    % Eosinophils 1 %    % Basophils 1 %    % Immature Granulocytes 0 %    NRBCs per 100 WBC 0 <1 /100    Absolute Neutrophils 4.4 1.6 - 8.3 10e3/uL    Absolute Lymphocytes 1.5 0.8 - 5.3 10e3/uL    Absolute Monocytes 0.4 0.0 - 1.3 10e3/uL    Absolute Eosinophils 0.0 0.0 - 0.7 10e3/uL    Absolute Basophils 0.0 0.0 - 0.2 10e3/uL    Absolute Immature Granulocytes 0.0 <=0.4 10e3/uL    Absolute NRBCs 0.0 10e3/uL   Head CT w/o contrast    Narrative    EXAM: CT HEAD W/O CONTRAST  LOCATION: [facility]  [facility]  DATE: 1/30/2024    INDICATION: Altered mental status.  COMPARISON: CT dated 08/11/2020.   TECHNIQUE: Routine CT Head without IV contrast. Multiplanar reformats. Dose reduction techniques were used.    FINDINGS:  INTRACRANIAL CONTENTS: No acute intracranial hemorrhage, extra-axial fluid collection, or mass effect. No evidence of an acute transcortical confluent infarct. Mild to moderate presumed chronic small vessel ischemic changes. Chronic bilateral basal   ganglia/corona radiata and thalamic lacunar infarcts, as before. These chronic ischemic changes limit the assessment of a superimposed acute infarct. Mildly progressed, moderate cerebral parenchymal volume loss with slightly disproportionate involvement   of the medial bilateral temporal lobes. Mild cerebellar volume loss. No hydrocephalus.     VISUALIZED ORBITS/SINUSES/MASTOIDS: No acute intraorbital finding. Mild right sphenoid sinus mucosal thickening. No paranasal sinus air-fluid level or mastoid effusion.     BONES/SOFT TISSUES: No acute abnormality.      Impression    IMPRESSION:  1.  No acute intracranial abnormality.  2.  Chronic changes, as described.   CT Abdomen Pelvis w Contrast    Narrative    EXAM: CT ABDOMEN PELVIS W CONTRAST  LOCATION: [facility]  DATE: 1/30/2024    INDICATION: Concern for bowel obstruction  COMPARISON: 07/20/2021  TECHNIQUE: CT scan of the abdomen and pelvis was performed following injection of IV contrast. Multiplanar reformats were obtained. Dose reduction techniques were used.  CONTRAST: ISOVUE 370 90 mL    FINDINGS:   LOWER CHEST: Normal.    HEPATOBILIARY: Liver and gallbladder within normal limits.    PANCREAS: 5 mm cystic lesion is noted within the distal pancreatic body. 14 x 10 mm cystic lesion noted within the tail of the pancreas. Pancreas otherwise within normal limits.    SPLEEN: Normal.    ADRENAL GLANDS: Normal.    KIDNEYS/BLADDER: No significant mass, stone, or  hydronephrosis.    BOWEL: Diverticulosis of the colon. No acute inflammatory change. No obstruction.     LYMPH NODES: Normal.    VASCULATURE: Mild atherosclerotic disease of the abdominal aorta and its branches.    PELVIC ORGANS: Bladder within normal limits.    MUSCULOSKELETAL: Grade 1 anterolisthesis of L4 on L5.      Impression    IMPRESSION:   1.  No acute findings in the abdomen and pelvis. No evidence of bowel obstruction.  2.  Two cystic lesions are noted within the pancreas measuring 5 mm and 14 mm. No suspicious lesion. Recommend follow-up as recommended below.    REFERENCE:  Revisions of international consensus [place] guidelines for the management of IPMN of the pancreas. Pancreatology 2017;17(5):738-753.    Largest cyst between 10-20 mm: CT or MRI/MRCP every 6 months for 1 year and then yearly for 2 years and then every 2 years if no change.      Troponin T, High Sensitivity (now)   Result Value Ref Range    Troponin T, High Sensitivity 32 (H) <=14 ng/L         RADIOLOGY  CT Abdomen Pelvis w Contrast   Final Result   IMPRESSION:    1.  No acute findings in the abdomen and pelvis. No evidence of bowel obstruction.   2.  Two cystic lesions are noted within the pancreas measuring 5 mm and 14 mm. No suspicious lesion. Recommend follow-up as recommended below.      REFERENCE:   Revisions of international consensus [place] guidelines for the management of IPMN of the pancreas. Pancreatology 2017;17(5):738-753.      Largest cyst between 10-20 mm: CT or MRI/MRCP every 6 months for 1 year and then yearly for 2 years and then every 2 years if no change.          Head CT w/o contrast   Final Result   IMPRESSION:   1.  No acute intracranial abnormality.   2.  Chronic changes, as described.             EKG:    Rate: 96 bpm  Rhythm: Normal Sinus Rhythm  Axis: Normal  Interval: Normal  Conduction: Normal  QRS: Normal  ST: Normal  T-wave: Normal  QT: Not prolonged  Comparison EKG: no significant change compared to 10  March 2021  Impression:  No acute ischemic change   I have independently reviewed and interpreted today's EKG, pending Cardiologist read          MEDICATIONS GIVEN IN THE EMERGENCY:  Medications   iopamidol (ISOVUE-370) solution 90 mL (90 mLs Intravenous $Given 1/30/24 2123)       NEW PRESCRIPTIONS STARTED AT TODAY'S ER VISIT  New Prescriptions    No medications on file        I, Jeanine Stone, am serving as a scribe to document services personally performed by Bry Velazquez D.O., based on my observations and the provider's statements to me.  I, Bry Velazquez D.O., attest that Jeanine Stone is acting in a scribe capacity, has observed my performance of the services and has documented them in accordance with my direction.     Bry Velazquez D.O.  Emergency Medicine  [facility] EMERGENCY ROOM  [address] [id number]  [phone]  Dept: [phone]       Bry Velazquez,   01/30/24 9535

## 2024-01-30 NOTE — ED TRIAGE NOTES
Lives with relatives that take care of her but is here with her reported legal health care representative.  History of dementia and for the last 2 weeks, has not been eating well and has been having increasing confusion. Care giver states her relatives have only been giving her pedialyte and ensure because patient is refusing to eat. Patient has no complaints of pain. Has not had reported BM in the last 2 weeks per caregiver      Triage Assessment (Adult)       Row Name 01/30/24 2596          Triage Assessment    Airway WDL WDL        Respiratory WDL    Respiratory WDL WDL        Cognitive/Neuro/Behavioral WDL    Cognitive/Neuro/Behavioral WDL X     Level of Consciousness other (see comments)  history of dementia

## 2024-02-16 PROBLEM — Z86.59 HISTORY OF DEMENTIA: Status: ACTIVE | Noted: 2024-01-01

## 2024-02-16 PROBLEM — R62.7 FAILURE TO THRIVE IN ADULT: Status: ACTIVE | Noted: 2024-01-01

## 2024-02-16 NOTE — PROGRESS NOTES
[facility]    Medicine Progress Note - Hospitalist Service    Date of Admission:  2/15/2024    Assessment & Plan    88 year old female who presented with history of dementia, CVA, hypertension, and hyperlipidemia, presenting with family complaints of hallucinations, worsening mental status.    Initial evaluation without significant lab or CXR, CT abdomen/pelvis findings.  Patient with constipation, possible rectal stool impaction.  Patient's family can no longer care for her at home, and requesting memory care/SNF placement.  Under [facility] care until placement in memory care arranged.     Severe cognitive impairment, Alzheimer's dementia.   Hallucinations  Family unable to care for her at home.   Awaiting placement in memory care unit.   Continue [facility] cares.     HTN  HLP  History of TIA  Continue home medications.     Constipation  Received Compound enema on morning of 2/16/24.   Order senna docusate BID  Continue dulcolax and miralax as needed.         Diet: Combination Diet Regular Diet    DVT Prophylaxis: Pneumatic Compression Devices  Nina Catheter: Not present  Lines: None     Cardiac Monitoring: None  Code Status: will need to discuss with family.  Currently Full code until discussion.    Clinically Significant Risk Factors Present on Admission                # Drug Induced Platelet Defect: home medication list includes an antiplatelet medication   # Hypertension: Noted on problem list                 Disposition Plan      Expected Discharge Date: 02/17/2024                    Seven Cooper DO  Hospitalist Service  [facility]  Securely message with [employer] (more info)  Text page via [employer] Paging/Directory   ______________________________________________________________________    Interval History   Patient comfortable, sleeping.     Physical Exam   Vital Signs: Temp: 97.9  F (36.6  C) Temp src: Temporal BP: 139/78 Pulse: 82   Resp: 23 SpO2: 97 %       Weight: 93 lbs 0 oz    Gen: no acute distress, comfortable, very frail, sleeping.  Pulm: CTAB, without wheezing, crackles, breathing comfortably at rest  CV: RRR, no significant lower extremity pitting edema  GI: abdomen is soft, non-distended  MSK: no obvious deformities of the extremities  Derm: warm, very dry skin  Psych: appears confused but calm, not able to answer questions.    Medical Decision Making       40 MINUTES SPENT BY ME on the date of service doing chart review, history, exam, documentation & further activities per the note.      Data     I have personally reviewed the following data over the past 24 hrs:    9.3  \   12.6   / 292     141 103 19.3 /  181 (H)   4.8 25 0.80 \     ALT: 97 (H) AST: N/A AP: 116 TBILI: 1.1   ALB: 3.9 TOT PROTEIN: 7.2 LIPASE: 38     Trop: 33 (H) BNP: N/A     Procal: 0.12 CRP: N/A Lactic Acid: N/A         Imaging results reviewed over the past 24 hrs:   Recent Results (from the past 24 hour(s))   XR Chest 1 View    Narrative    EXAM: XR CHEST 1 VIEW  LOCATION: [facility]  DATE: 2/16/2024    INDICATION: Weakness. Altered mental status. Hallucinations.  COMPARISON: 03/10/2021      Impression    IMPRESSION:     No focal airspace disease. No pleural effusion or pneumothorax.    The cardiomediastinal silhouette is unremarkable. Aortic calcification.   CT Abdomen Pelvis w Contrast    Narrative    EXAM: CT ABDOMEN PELVIS W CONTRAST  LOCATION: [facility]  DATE: 2/16/2024    INDICATION: constipation, generalized abdominal pain  COMPARISON: 01/30/2024; 07/20/2021  TECHNIQUE: CT scan of the abdomen and pelvis was performed following injection of IV contrast. Multiplanar reformats were obtained. Dose reduction techniques were used.  CONTRAST: 80 mL Isovue-370    FINDINGS:   LOWER CHEST: Normal.    HEPATOBILIARY: Normal liver and gallbladder. Mild intrahepatic biliary dilatation is stable dating back to 2021, possibly  physiologic.    PANCREAS: A 5 mm cystic lesion again seen in the pancreatic body. A 1.4 cm cystic lesion again seen in the pancreatic tail. Additional 6 to 8 mm cystic lesions also seen in the more proximal pancreatic body. These lesions are stable from most recent   comparison study but appear mildly increased since 2021. Pancreas is otherwise normal.    SPLEEN: Normal.    ADRENAL GLANDS: Normal.    KIDNEYS/BLADDER: Normal. Small bilateral renal cystic lesions are benign in appearance and do not require follow-up.    BOWEL: Moderate amount of stool is seen in the rectum. Small to moderate amount stool in the remainder of the colon, mildly increased from prior study. No bowel wall thickening or mechanical obstruction. Multiple diverticula in the sigmoid colon without   acute diverticulitis.    LYMPH NODES: Normal.    VASCULATURE: Mild aortoiliac atherosclerotic calcifications. No abdominal aortic aneurysm.    PELVIC ORGANS: Status post hysterectomy. No abnormal adnexal masses.    MUSCULOSKELETAL: Stable grade 1 degenerative anterolisthesis of L4 and L5 and mild superior endplate height loss at T12 and L1. No suspicious osseous lesions or acute fractures.        Impression    IMPRESSION:     1.  Small to moderate amount of stool in the colon including a moderate amount in the rectum, similar to prior study, with overall stool burden having mildly increased. These findings may represent constipation versus rectal stool impaction.    2.  Stable cystic lesions in the pancreatic body and tail measuring up to 1.4 cm are stable from recent study on 01/30/2024, mildly increased from 2021. Recommend follow-up pancreatic protocol MRI in 2 years x2.

## 2024-02-16 NOTE — ED NOTES
Pt sleeping and repositioned in bed, no signs of discomfort observed. Pts brief is dry with no urine output or bowel movement. VSS.

## 2024-02-16 NOTE — ED NOTES
Straight cath earlier with NM tech. Changed pt with assist of one GOLD tech. Pt had no bm after enema.   Pt interaction is minimal, she does not speak.

## 2024-02-16 NOTE — ED PROVIDER NOTES
EMERGENCY DEPARTMENT ENCOUNTER      NAME: Helen Napoles  AGE: 88 year old female  YOB: 1935  MRN: [id number]  EVALUATION DATE & TIME: No admission date for patient encounter.    PCP: Carol Bates    ED PROVIDER: Rafaela Ashby M.D.      Chief Complaint   Patient presents with    Altered Mental Status         FINAL IMPRESSION:  1. History of dementia    2. Failure to thrive in adult        MEDICAL DECISION MAKING:    Pertinent Labs & Imaging studies reviewed. (See chart for details)  ED Course as of 02/16/24 0335   Thu Feb 15, 2024   2154 Afebrile.  Vital signs here with tachycardia, otherwise unremarkable.  Patient is coming in with family members with concern for worsening altered mental status, hallucinations, no p.o. intake and no bowel movements.  She has a history of Alzheimer's, is actually slated to get into a memory care facility this coming Tuesday.  Her caregiver states that she is having a difficult time caring for her as she needs much more assistance as of now.  They state that she has had more hallucinations.  Is refusing to eat or drink.  Unknown last bowel movement.  Seems to have gotten acutely worse in the last few weeks.  Was seen here in the emergency department few weeks ago and had a workup that was fairly benign.  Sent back home but family states that she is continuing to get worse and worse.  She used to be able to get up, walk around.  Now she has not been able to get out of bed.  She is hallucinating daily.  She has not eaten solid food in the last few weeks.  Minimal fluid intake.  They do report that she has not had any fevers.  She has not had any vomiting.  They have not seen any bowel movements.    Physical exam for patient here elderly appearing female without acute cardiopulmonary distress.  She was initially evaluated in a wheelchair as boarding crisis there is currently no beds available in the emergency department.  She is thin, cachectic appearing.  Not  engaging with any conversation, apparently this is how she has been over the last few weeks.  Heart rate is tachycardic.  Abdomen slightly distended but without tenderness to palpation diffusely.  She does not have any tenderness on bony palpation of her upper or lower extremity or her neck or her back.  She has no evidence of head trauma.  She is moving all extremities spontaneously.  There is no evidence of facial droop.       2157 Patient here may be just progression however family reports significant worsening in the last few weeks.  Plan for checking blood work, EKG, urinalysis, chest x-ray and CT scan abdomen pelvis given her abdominal distention.   Fri Feb 16, 2024   0303 Patient CT scan here does reveal large amount of stool in the colon.  Will try a enema here.   0327 Patient's workup here largely unremarkable with the exception of she does have some constipation and large stool burden seen on her CT scan.  Otherwise labs here are unremarkable.  Given the fact that patient's family and caregiver are unable to care for patient at home will put in for senior care care.  Placed referral to the care management and social work.  Patient will remain in the emergency department until evaluated by these groups     0333 Signed out to night shift pending signed out to dayshift for care coordination.   0333 Family updated at bedside.         Medical Decision Making  Obtained supplemental history:Supplemental history obtained?: Documented in chart and Family Member/Significant Other  Reviewed external records: External records reviewed?: Documented in chart and Outpatient Record: [facility] Emergency Department Visit on 1/30/24  Care impacted by chronic illness:Dementia, Hyperlipidemia, and Hypertension  Care significantly affected by social determinants of health:Access to Medical Care  Did you consider but not order tests?: Work up considered but not performed and documented in chart, if  applicable  Did you interpret images independently?: Independent interpretation of ECG and images noted in documentation, when applicable.  Consultation discussion with other provider:Did you involve another provider (consultant, , pharmacy, etc.)?: No  Admission considered. Patient was signed out to the oncoming physician, disposition pending.      Critical care: 0 minutes excluding separately billable procedures.  Includes bedside management, time reviewing test results, review of records, discussing the case with staff, documenting the medical record and time spent with family members (or surrogate decision makers) discussing specific treatment issues.          ED COURSE:  9:49 PM I met with the patient, obtained history, performed an initial exam, and discussed options and plan for diagnostics and treatment here in the ED.  3:26 AM I rechecked patient and updated patient and family on results and care plan.     The importance of close follow up was discussed. We reviewed warning signs and symptoms, and I instructed Ms. Napoles to return to the emergency department immediately if she develops any new or worsening symptoms. I provided additional verbal discharge instructions. Ms. Napoles expressed understanding and agreement with this plan of care, her questions were answered, and she was discharged in stable condition.     MEDICATIONS GIVEN IN THE EMERGENCY:  Medications   Enema Compound (docusate/mineral oil/NaPhos) NO MAG CIT PREMIX (has no administration in time range)   iopamidol (ISOVUE-370) solution 80 mL (80 mLs Intravenous $Given 2/16/24 0211)   sodium chloride 0.9% BOLUS 500 mL (500 mLs Intravenous $New Bag 2/16/24 0224)       NEW PRESCRIPTIONS STARTED AT TODAY'S ER VISIT:  New Prescriptions    No medications on file          =================================================================    HPI    Patient information was obtained from: Patient's Family    Use of : N/A       Helen ALLEN  Dony is a 88 year old female with a history of HTN, hyperlipidemia, TIA, Alzheimer's disease, who presents to the ED via walk-in for the evaluation of altered mental status.    HPI Limited Secondary to Dementia    Per family, patient has had worsening altered mental status and hallucinations over the last several weeks. Family states that she will talk to her pillow and say they are people. Prior to six weeks ago, patient was able to get up, walk around, and feed herself. However, now, patient is unable to walk herself. She has been eating less. Unsure if she has had a bowel movement recently as well. Patient has been complaining of generalized body aches. Over the last several weeks, patient has mostly been staying in bed. Family states that caregiver is unable to care for patient and recommended to be seen in the ED. Of note, patient was seen two weeks ago in the ED and there were no acute findings at that time. Patient does have a history of dementia. Family states that they were able to get patient into a memory care facility and is scheduled to move into the facility next week. Otherwise, patient has not had any urinary changes, fevers, vomiting. No other complaints at this time.    Per chart review, patient was seen at [facility] Emergency Department on 1/30/24 for generalized weakness. UA did not show any evidence of UTI, and there was no additional evidence of infection on this patient she had no elevated white count, no fever, no other focal findings. EKG did not show any evidence of ischemia or arrhythmia, 2 troponins were both negative, do not believe this to represent ACS. Lab testing was otherwise largely unremarkable on this patient, other than very minimally elevated calcium, very minimally elevated glucose, and minimally elevated AST. Patient discharged home.    REVIEW OF SYSTEMS   Review of Systems   Unable to perform ROS: Dementia     PAST MEDICAL HISTORY:  Past Medical  History:   Diagnosis Date    Anxiety     Benign essential HTN     Cataract     Cerebral vascular accident (H)     tia    Cervical spondylosis     Cervicalgia     Chest pain     Family history of myocardial infarction     Mom 58    Fatigue     Headache     High cholesterol     HLD (hyperlipidemia)     Limb pain     Osteoarthritis     Osteoarthritis     Osteopenia     Palpitations        PAST SURGICAL HISTORY:  Past Surgical History:   Procedure Laterality Date    OOPHORECTOMY      one ovary    ZZC TOTAL ABDOM HYSTERECTOMY      Description: Hysterectomy;  Recorded: 08/02/2007;       CURRENT MEDICATIONS:      Current Facility-Administered Medications:     Enema Compound (docusate/mineral oil/NaPhos) NO MAG CIT PREMIX, 226 mL, Rectal, Once, Rafaela Ashby MD    Current Outpatient Medications:     amLODIPine (NORVASC) 2.5 MG tablet, TAKE 1 TABLET BY MOUTH EVERY DAY, Disp: 90 tablet, Rfl: 3    aspirin (ASA) 81 MG EC tablet, Take 81 mg by mouth, Disp: , Rfl:     citalopram (CELEXA) 10 MG tablet, TAKE 1 TABLET BY MOUTH EVERY DAY, Disp: , Rfl:     fluticasone (FLONASE) 50 MCG/ACT nasal spray, 2 sprays, Disp: , Rfl:     omeprazole (PRILOSEC) 20 MG DR capsule, Take 20 mg by mouth, Disp: , Rfl:     ALLERGIES:  Allergies   Allergen Reactions    Hydroxyzine Other (See Comments)     Extreme sedation     Pentazocine Unknown       FAMILY HISTORY:  Family History   Problem Relation Age of Onset    Cancer Mother 60.00        Lung    Heart Disease Mother     Cerebrovascular Disease Father     Ovarian Cancer Maternal Aunt 22.00       SOCIAL HISTORY:   Social History     Socioeconomic History    Marital status:     Number of children: 0   Tobacco Use    Smoking status: Never    Smokeless tobacco: Never   Substance and Sexual Activity    Alcohol use: No    Drug use: No   Social History Narrative    , no children.        PHYSICAL EXAM:    Vitals: /63   Pulse 85   Temp 97.9  F (36.6  C) (Temporal)   Resp 16   Ht  "1.473 m (4' 10\")   Wt 42.2 kg (93 lb)   SpO2 98%   BMI 19.44 kg/m     General:. Alert. Elderly appearing female without acute cardiopulmonary distress. She was initially evaluated in a wheelchair as boarding crisis there is currently no beds available in the emergency department. She is thin, cachectic appearing. Not engaging with any conversation, apparently this is how she has been over the last few weeks. She has no evidence of head trauma.   HENT: Oropharynx without erythema or exudates. MMM.  TMs clear bilaterally.  Eyes: Pupils mid-sized and equally reactive.   Neck: Full AROM.  No midline tenderness to palpation.  Cardiovascular: Tachycardic, Regular rhythm. Peripheral pulses 2+ bilaterally.  Chest/Pulmonary: Normal work of breathing. Lung sounds clear and equal throughout, no wheezes or crackles. No chest wall tenderness or deformities.  Abdomen: Soft, Abdomen slightly distended but without tenderness to palpation diffusely. No guarding or rebound.  Back/Spine: No CVA or midline tenderness.  Extremities: Normal ROM of all major joints. No lower extremity edema. She does not have any tenderness on bony palpation of her upper or lower extremity   Skin: Warm and dry. Normal skin color.   Neuro: Speech clear. CNs grossly intact. Moves all extremities appropriately. Strength and sensation grossly intact to all extremities. There is no evidence of facial droop.   Psych: Normal affect/mood, cooperative, memory appropriate.     LAB:  All pertinent labs reviewed and interpreted.  Labs Ordered and Resulted from Time of ED Arrival to Time of ED Departure   COMPREHENSIVE METABOLIC PANEL - Abnormal       Result Value    Sodium 141      Potassium 4.8      Carbon Dioxide (CO2) 25      Anion Gap 13      Urea Nitrogen 19.3      Creatinine 0.80      GFR Estimate 71      Calcium 10.1      Chloride 103      Glucose 181 (*)     Alkaline Phosphatase 116      AST        ALT 97 (*)     Protein Total 7.2      Albumin 3.9      " Bilirubin Total 1.1     ROUTINE UA WITH MICROSCOPIC REFLEX TO CULTURE - Abnormal    Color Urine Yellow      Appearance Urine Clear      Glucose Urine Negative      Bilirubin Urine Negative      Ketones Urine 10 (*)     Specific Gravity Urine 1.015      Blood Urine Negative      pH Urine 6.0      Protein Albumin Urine 20 (*)     Urobilinogen Urine <2.0      Nitrite Urine Negative      Leukocyte Esterase Urine Negative      Mucus Urine Present (*)     RBC Urine 10 (*)     WBC Urine 5      Squamous Epithelials Urine 3 (*)     Hyaline Casts Urine 1     TROPONIN T, HIGH SENSITIVITY - Abnormal    Troponin T, High Sensitivity 33 (*)    CBC WITH PLATELETS AND DIFFERENTIAL - Abnormal    WBC Count 9.3      RBC Count 4.60      Hemoglobin 12.6      Hematocrit 39.9      MCV 87      MCH 27.4      MCHC 31.6      RDW 13.8      Platelet Count 292      % Neutrophils 92      % Lymphocytes 5      % Monocytes 3      % Eosinophils 0      % Basophils 0      % Immature Granulocytes 0      NRBCs per 100 WBC 0      Absolute Neutrophils 8.5 (*)     Absolute Lymphocytes 0.5 (*)     Absolute Monocytes 0.3      Absolute Eosinophils 0.0      Absolute Basophils 0.0      Absolute Immature Granulocytes 0.0      Absolute NRBCs 0.0     LIPASE - Normal    Lipase 38     PROCALCITONIN - Normal    Procalcitonin 0.12         RADIOLOGY:  CT Abdomen Pelvis w Contrast   Final Result   IMPRESSION:       1.  Small to moderate amount of stool in the colon including a moderate amount in the rectum, similar to prior study, with overall stool burden having mildly increased. These findings may represent constipation versus rectal stool impaction.      2.  Stable cystic lesions in the pancreatic body and tail measuring up to 1.4 cm are stable from recent study on 01/30/2024, mildly increased from 2021. Recommend follow-up pancreatic protocol MRI in 2 years x2.      XR Chest 1 View   Final Result   IMPRESSION:       No focal airspace disease. No pleural effusion or  pneumothorax.      The cardiomediastinal silhouette is unremarkable. Aortic calcification.          EKG:  See MDM  I have independently reviewed and interpreted the EKG(s) documented above.           I, Estela Garcia, am serving as a scribe to document services personally performed by Dr. Rafaela Ashby  based on my observation and the provider's statements to me. I, Rafaela Ashby MD attest that Estela Garcia is acting in a scribe capacity, has observed my performance of the services and has documented them in accordance with my direction.      Rafaela Ashby M.D.  Emergency Medicine  [facility] EMERGENCY ROOM  [facility] [id number] [phone]  Dept: [phone]     Rafaela Ashby MD  02/16/24 0334

## 2024-02-16 NOTE — ED NOTES
Pt had a small formed bowel movement, and 150 ml urine output in purewick collection device. Pt brief and purewick changed. Pt repositioned for comfort.

## 2024-02-16 NOTE — PHARMACY-ADMISSION MEDICATION HISTORY
Pharmacist Admission Medication History    Admission medication history is complete. The information provided in this note is only as accurate as the sources available at the time of the update.    Information Source(s): CareEverywhere/SureScripts and Power of    via in-person    Pertinent Information:  Most medications were recently discontinued    Changes made to PTA medication list:  Added: None  Deleted: Aspirin , donepezil, memantine, Vitamin D , sertraline  Changed: None    Allergies reviewed with patient and updates made in EHR: yes    Medication History Completed By: Latricia Vazquez RPH 2/16/2024 10:57 AM    PTA Med List   Medication Sig Last Dose    amLODIPine (NORVASC) 5 MG tablet Take 2.5 mg by mouth daily Unknown

## 2024-02-16 NOTE — H&P
[facility] MEDICINE ADMISSION HISTORY AND PHYSICAL     Brief Synopsis:     Helen Napoles is a 88 year old female who presented with complaints of hallucinations, worsening mental status.    Medical history is notable for dementia, awaiting placement in memory care, prior CVA, hypertension, anxiety, hyperlipidemia.    Initial evaluation revealed slightly elevated blood pressure, tachycardia, normal basic metabolic panel, ALT mildly elevated, normal CBC, normal procalcitonin, troponin of 33, urine negative for infection.  Chest x-ray negative.  CT abdomen pelvis with possible constipation versus rectal stool impaction, stable cystic lesions in the pancreatic body and tail measuring up to 1.4 cm are stable compared to 2 weeks prior, mildly increased from 2021.    Initial treatment included enema, IV fluids.    Assessment and Plan:  Worsening cognitive status  Dementia  Family reports inability to care for her at home  Is awaiting placement in memory care unit  Start jail care while awaiting placement    Clinically Significant Risk Factors Present on Admission                # Drug Induced Platelet Defect: home medication list includes an antiplatelet medication   # Hypertension: Noted on problem list                   Disposition Plan      Expected Discharge Date: 02/17/2024               Chief Complaint Hallucinations, worsening confusion     HISTORY   Helen Napoles is a 88 year old female who presented with complaints of hallucinations and worsening confusion.    Per ED provider:  Helen Napoles is a 88 year old female with a history of HTN, hyperlipidemia, TIA, Alzheimer's disease, who presents to the ED via walk-in for the evaluation of altered mental status.     HPI Limited Secondary to Dementia     Per family, patient has had worsening altered mental status and hallucinations over the last several weeks. Family states that she will talk to her pillow and say they are  people. Prior to six weeks ago, patient was able to get up, walk around, and feed herself. However, now, patient is unable to walk herself. She has been eating less. Unsure if she has had a bowel movement recently as well. Patient has been complaining of generalized body aches. Over the last several weeks, patient has mostly been staying in bed. Family states that caregiver is unable to care for patient and recommended to be seen in the ED. Of note, patient was seen two weeks ago in the ED and there were no acute findings at that time. Patient does have a history of dementia. Family states that they were able to get patient into a memory care facility and is scheduled to move into the facility next week. Otherwise, patient has not had any urinary changes, fevers, vomiting. No other complaints at this time.    Unable to obtain hx from pt, she does not answer questions for me.  Past Medical History     Past Medical History:  No date: Anxiety  No date: Benign essential HTN  No date: Cataract  No date: Cerebral vascular accident (H)      Comment:  tia  No date: Cervical spondylosis  No date: Cervicalgia  No date: Chest pain  No date: Family history of myocardial infarction      Comment:  Mom 58  No date: Fatigue  No date: Headache  No date: High cholesterol  No date: HLD (hyperlipidemia)  No date: Limb pain  No date: Osteoarthritis  No date: Osteoarthritis  No date: Osteopenia  No date: Palpitations     Surgical History     Past Surgical History:   Procedure Laterality Date    OOPHORECTOMY      one ovary    ZZC TOTAL ABDOM HYSTERECTOMY      Description: Hysterectomy;  Recorded: 08/02/2007;     Family History      Family History   Problem Relation Age of Onset    Cancer Mother 60.00        Lung    Heart Disease Mother     Cerebrovascular Disease Father     Ovarian Cancer Maternal Aunt 22.00      Social History      Social History     Tobacco Use    Smoking status: Never    Smokeless tobacco: Never   Substance Use Topics     Alcohol use: No    Drug use: No      Allergies     Allergies   Allergen Reactions    Hydroxyzine Other (See Comments)     Extreme sedation     Pentazocine Unknown     Prior to Admission Medications      Prior to Admission Medications   Prescriptions Last Dose Informant Patient Reported? Taking?   amLODIPine (NORVASC) 2.5 MG tablet   No No   Sig: TAKE 1 TABLET BY MOUTH EVERY DAY   aspirin (ASA) 81 MG EC tablet   Yes No   Sig: Take 81 mg by mouth   citalopram (CELEXA) 10 MG tablet   Yes No   Sig: TAKE 1 TABLET BY MOUTH EVERY DAY   fluticasone (FLONASE) 50 MCG/ACT nasal spray   Yes No   Si sprays   omeprazole (PRILOSEC) 20 MG DR capsule   Yes No   Sig: Take 20 mg by mouth      Facility-Administered Medications: None      Review of Systems     A 12 point comprehensive review of systems was negative except as noted above in HPI.    PHYSICAL EXAMINATION     Vitals      Temp:  [97.9  F (36.6  C)] 97.9  F (36.6  C)  Pulse:  [] 80  Resp:  [13-20] 20  BP: (127-174)/(63-98) 157/85  SpO2:  [97 %-100 %] 100 %    Examination   Physical Exam:    Gen: no acute distress, comfortable, very frail, confused  ENT: no scleral icterus, very dry oral mucosa  Pulm: lungs are clear without wheezing, crackles, breathing comfortably at rest  CV: regular rate and rhythm, no significant lower extremity pitting edema  GI: abdomen is soft, non-distended  MSK: no obvious deformities of the extremities  Derm: warm, very dry skin  Psych: appears confused but calm, not able to answer questions.      Pertinent Radiology     Radiology Results:   Recent Results (from the past 24 hour(s))   XR Chest 1 View    Narrative    EXAM: XR CHEST 1 VIEW  LOCATION: [facility]  DATE: 2024    INDICATION: Weakness. Altered mental status. Hallucinations.  COMPARISON: 03/10/2021      Impression    IMPRESSION:     No focal airspace disease. No pleural effusion or pneumothorax.    The cardiomediastinal silhouette is unremarkable.  Aortic calcification.   CT Abdomen Pelvis w Contrast    Narrative    EXAM: CT ABDOMEN PELVIS W CONTRAST  LOCATION: [facility]  DATE: 2/16/2024    INDICATION: constipation, generalized abdominal pain  COMPARISON: 01/30/2024; 07/20/2021  TECHNIQUE: CT scan of the abdomen and pelvis was performed following injection of IV contrast. Multiplanar reformats were obtained. Dose reduction techniques were used.  CONTRAST: 80 mL Isovue-370    FINDINGS:   LOWER CHEST: Normal.    HEPATOBILIARY: Normal liver and gallbladder. Mild intrahepatic biliary dilatation is stable dating back to 2021, possibly physiologic.    PANCREAS: A 5 mm cystic lesion again seen in the pancreatic body. A 1.4 cm cystic lesion again seen in the pancreatic tail. Additional 6 to 8 mm cystic lesions also seen in the more proximal pancreatic body. These lesions are stable from most recent   comparison study but appear mildly increased since 2021. Pancreas is otherwise normal.    SPLEEN: Normal.    ADRENAL GLANDS: Normal.    KIDNEYS/BLADDER: Normal. Small bilateral renal cystic lesions are benign in appearance and do not require follow-up.    BOWEL: Moderate amount of stool is seen in the rectum. Small to moderate amount stool in the remainder of the colon, mildly increased from prior study. No bowel wall thickening or mechanical obstruction. Multiple diverticula in the sigmoid colon without   acute diverticulitis.    LYMPH NODES: Normal.    VASCULATURE: Mild aortoiliac atherosclerotic calcifications. No abdominal aortic aneurysm.    PELVIC ORGANS: Status post hysterectomy. No abnormal adnexal masses.    MUSCULOSKELETAL: Stable grade 1 degenerative anterolisthesis of L4 and L5 and mild superior endplate height loss at T12 and L1. No suspicious osseous lesions or acute fractures.        Impression    IMPRESSION:     1.  Small to moderate amount of stool in the colon including a moderate amount in the rectum, similar to prior study,  with overall stool burden having mildly increased. These findings may represent constipation versus rectal stool impaction.    2.  Stable cystic lesions in the pancreatic body and tail measuring up to 1.4 cm are stable from recent study on 01/30/2024, mildly increased from 2021. Recommend follow-up pancreatic protocol MRI in 2 years x2.     EKG Results: personally reviewed.  Sinus rhythm.    Bry Casillas DO  [facility]   Phone: #[phone]

## 2024-02-16 NOTE — CONSULTS
Care Management Initial Consult      General Information  Assessment completed with: Friend, POA/HCA/family friend - Beena GAYLA  Type of CM/SW Visit: Initial Assessment  Primary Care Provider verified and updated as needed: Yes   Readmission within the last 30 days: no previous admission in last 30 days   Reason for Consult: discharge planning, facility placement, health care directive, transportation  Advance Care Planning: Advance Care Planning Reviewed: concerns discussed (Copies obtained)  Copies emailed to [employer] for validation.     Communication Assessment  Patient's communication style: spoken language (English or Bilingual)        Cognitive  Cognitive/Neuro/Behavioral: Confused/somnolent                 Living Environment:   People in home: other relative(s), other (see comments) (Pt had been staying in a house with her cousin Fe up until now)     Current living Arrangements: house      Able to return to prior arrangements: other (see comments) (TBD further. Currently attempting TCU placement)     Family/Social Support:  Care provided by: self, other (see comments) (Care had been provided by cousin Fe and other family as needed up to this point)  Provides care for: no one, unable/limited ability to care for self  Marital Status:   Limited to, Other (specify) (HCA Beena and cousin when able/willing to support)          Description of Support System: Other (see comments), Involved (Cousin currently states inability to provide support to the pt)    Support Assessment: Lacks necessary supervision and assistance, Lacks adequate physical care, Caregiver difficulty providing physical care/safety, Caregiver experiencing high stress    Current Resources:   Patient receiving home care services: No  Community Resources: None  Equipment currently used at home:    Supplies currently used at home: Incontinence Supplies, Nutritional Supplements    Employment/Financial:  Employment Status: retired      Financial Concerns: none   Referral to Financial Worker: No     Does the patient's insurance plan have a 3 day qualifying hospital stay waiver?  No    Lifestyle & Psychosocial Needs:  Social Determinants of Health     Food Insecurity: Not on file   Depression: Not at risk (4/30/2021)    PHQ-2     PHQ-2 Score: 1   Housing Stability: Not on file   Tobacco Use: Low Risk  (2/15/2024)    Patient History     Smoking Tobacco Use: Never     Smokeless Tobacco Use: Never     Passive Exposure: Not on file   Financial Resource Strain: Not on file   Alcohol Use: Not on file   Transportation Needs: Not on file   Physical Activity: Not on file   Interpersonal Safety: Not on file   Stress: Not on file   Social Connections: Not on file     Functional Status:  Prior to admission patient needed assistance:   Dependent ADLs:: Ambulation-walker, Bathing, Dressing, Grooming, Eating, Incontinence, Positioning, Transfers, Toileting  Dependent IADLs:: Cleaning, Cooking, Laundry, Shopping, Meal Preparation, Medication Management, Transportation, Incontinence, Money Management  Assessment of Functional Status: Not at  functional baseline    Mental Health Status:  Mental Health Status: No Current Concerns       Chemical Dependency Status:  Chemical Dependency Status: No Current Concerns           Values/Beliefs:  Spiritual, Cultural Beliefs, Jew Practices, Values that affect care: no (None identified)             Additional Information:  Pt is NOT currently under [facility] Care(CC) at this time. The process for placing a pt under CC has not taken place and pt's hospitalization is still being submitted through current insurance provider. Pt does not meet CC criteria at this time. Writer met with pt's POA/HCA Beena MEDINA) to introduce Care Management(CM), obtain an initial assessment, and provide discharge support. Pt resides in her cousin Fe's home. Pt had been ambulatory until ~2 months ago. Pt's ability to assist with her I/ADL  "has been declining over the same amount of time. SHANTANU reports Fe has been the primary caregiver until now stating inability to do so, due to pt's change in condition. SHANTANU reports Fe will not allow \"strangers\" into her home in order to increase pt assistance. SHANTANU states financial ability and willingness to ensure pt is receiving appropriate care wherever she resides. SHANTANU is hopeful for a brief TCU stay prior to acceptance into an arranged Memory care [facility]. Pt/family was provided with the Medicare Compare list for SNF and Home Care.  Discussed associated Medicare star ratings to assist with choice for referrals/discharge planning Yes. Education was given to pt/family that star ratings are updated/maintained by Medicare and can be reviewed by visiting www.medicare.gov Yes - referrals placed for TCU transition - no accepting facilities as of 1311 hrs.    Pt is set to transition to the [facility] under Memory Care on 02/20.  is zarina Cavanaugh at [phone]. SHANTANU is currently at the facility attempting to see if pt can arrive prior to 2/20 and to inquire if they have a TCU type unit the pt could stay on in the meanwhile(not found in Epic). SHANTANU has been provided with writers contact information and was asked to have the shelter RN contact him to discuss eventual transition. Writer will follow-up with SHANTANU upon arrival back to the hospital to discover any new information to support pt discharging sooner than later.    02/15-16 per ED FAZAL Santos.-\"88 year old female with a history of HTN, hyperlipidemia, TIA, Alzheimer's disease, who presents to the ED via walk-in for the evaluation of altered mental status.     HPI Limited Secondary to Dementia - Per family, patient has had worsening altered mental status and hallucinations over the last several weeks. Family states that she will talk to her pillow and say they are people. Prior to six weeks ago, patient was able to get up, walk around, and feed herself. However, " "now, patient is unable to walk herself. She has been eating less. Unsure if she has had a bowel movement recently as well. Patient has been complaining of generalized body aches. Over the last several weeks, patient has mostly been staying in bed. Family states that caregiver is unable to care for patient and recommended to be seen in the ED. Of note, patient was seen two weeks ago in the ED and there were no acute findings at that time. Patient does have a history of dementia. Family states that they were able to get patient into a memory care facility and is scheduled to move into the facility next week. Otherwise, patient has not had any urinary changes, fevers, vomiting. No other complaints at this time.     Per chart review, patient was seen at [facility] Emergency Department on 1/30/24 for generalized weakness. UA did not show any evidence of UTI, and there was no additional evidence of infection on this patient she had no elevated white count, no fever, no other focal findings. EKG did not show any evidence of ischemia or arrhythmia, 2 troponins were both negative, do not believe this to represent ACS. Lab testing was otherwise largely unremarkable on this patient, other than very minimally elevated calcium, very minimally elevated glucose, and minimally elevated AST. Patient discharged home.\"    Ultimately, KM states it would be complicated but, she may be able to take pt to her own home until able to transition to the senior living. Agency transportation per stretcher anticipated due to cognitive impairment.    Addendum - Pt has been accepted for Memory Care/TCU placement at the [facility] by private-pay on Monday 02/19 after 11:00am. Admissions person Mindy can be reached at [phone]. Mindy states they will need TCU orders at minimum one-hour prior to pt's arrival. KM, charge RN, and Hospitalist notified.    Terrence Nina RN      "

## 2024-02-16 NOTE — ED TRIAGE NOTES
"Pt presents to the ED with c/o worsening altered mental status and hallucinations. Pt has seemed to have \"gotten worse\" over the past 2 weeks per family. Pt is pending placement to memory care. Hx of dementia. Seen here recently. Denies any fevers.          "

## 2024-02-17 NOTE — PLAN OF CARE
Problem: Adult Inpatient Plan of Care  Goal: Absence of Hospital-Acquired Illness or Injury  Intervention: Identify and Manage Fall Risk  Recent Flowsheet Documentation  Taken 2/17/2024 1530 by Latricia Linares RN  Safety Promotion/Fall Prevention:   activity supervised   assistive device/personal items within reach   clutter free environment maintained   lighting adjusted   nonskid shoes/slippers when out of bed   safety round/check completed  Taken 2/17/2024 1320 by Latricia Linares RN  Safety Promotion/Fall Prevention:   activity supervised   assistive device/personal items within reach   clutter free environment maintained   lighting adjusted   nonskid shoes/slippers when out of bed   safety round/check completed     Problem: Adult Inpatient Plan of Care  Goal: Optimal Comfort and Wellbeing  Outcome: Progressing     Patient a to self, VSS, and CMS intact. Cool extremities. Poor appetite, encouraging fluids and oral intake. Unable to stand at side of bed, very weak. Q2 turns. Unable to make needs known, increased visualization of patient. Call light within reach and all alarms in place.  Latricia Linares, LENIN

## 2024-02-17 NOTE — PROGRESS NOTES
[facility]    Medicine Progress Note - Hospitalist Service    Date of Admission:  2/15/2024    Assessment & Plan   88 year old female who presented with history of dementia, CVA, hypertension, and hyperlipidemia, presenting with family complaints of hallucinations, worsening mental status.    Initial evaluation without significant lab or CXR, CT abdomen/pelvis findings.  Patient with constipation, possible rectal stool impaction.  Patient's family can no longer care for her at home, and requesting memory care/SNF placement.  Under [facility] care.  Patient accepted to TCU/memory care at [facility] on Monday 2/19/24.     Severe cognitive impairment,   Alzheimer's dementia.   Hallucinations  Family unable to care for her at home.   Awaiting placement in memory care unit.   Continue [facility] cares.     HTN  HLP  History of TIA  Continue home medications.     Constipation  Received Compound enema on morning of 2/16/24.   Senna docusate BID  Continue dulcolax and miralax as needed.           Diet: Combination Diet Regular Diet    DVT Prophylaxis: Pneumatic Compression Devices  Nina Catheter: Not present  Lines: None     Cardiac Monitoring: None  Code Status: Full Code      Clinically Significant Risk Factors Present on Admission                  # Hypertension: Noted on problem list          # Financial/Environmental Concerns: none         Disposition Plan      Expected Discharge Date: 02/19/2024,  9:00 AM    Destination: assisted living  Discharge Comments: TCU accepted for 02/19 after 11am.            Seven Cooper DO  Hospitalist Service  [facility]  Securely message with [employer] (more info)  Text page via [employer] Paging/Directory   ______________________________________________________________________    Interval History   No new events per nursing.  She is laying in bed watching television.    Physical Exam   Vital Signs: Temp: 97.6  F (36.4  C) Temp  src: Axillary BP: (!) 143/69 Pulse: 71   Resp: 22 SpO2: 100 % O2 Device: None (Room air)    Weight: 93 lbs 0 oz    Gen: no acute distress, comfortable, very frail, sleeping.  Pulm: CTAB, without wheezing, crackles, breathing comfortably at rest  CV: RRR, no significant lower extremity pitting edema  GI: abdomen is soft, non-distended  MSK: no obvious deformities of the extremities  Derm: warm, very dry skin  Psych: appears confused but calm, not able to answer questions.    Medical Decision Making       30 MINUTES SPENT BY ME on the date of service doing chart review, history, exam, documentation & further activities per the note.      Data         Imaging results reviewed over the past 24 hrs:   No results found for this or any previous visit (from the past 24 hour(s)).

## 2024-02-17 NOTE — PROGRESS NOTES
Pt oriented to self, garbled speech. VSS, afebrile. Pleasant and cooperative. Slept in between cares overnight. Ice chips x1 and swabs overnight. Reposition q2 hours.

## 2024-02-18 NOTE — PROVIDER NOTIFICATION
Notified MD at 12:18  AM regarding  poor patient fluid intake .      Messaged: Bry Casillas    Orders were not obtained.    Comments: Paged provider regarding needs for IV fluids, provider read, but no new orders placed.

## 2024-02-18 NOTE — PLAN OF CARE
Problem: Adult Inpatient Plan of Care  Goal: Absence of Hospital-Acquired Illness or Injury  Intervention: Identify and Manage Fall Risk  Recent Flowsheet Documentation  Taken 2/18/2024 0910 by Latricia Linares RN  Safety Promotion/Fall Prevention: safety round/check completed  Taken 2/18/2024 0730 by Latricia Linares RN  Safety Promotion/Fall Prevention:   activity supervised   assistive device/personal items within reach   clutter free environment maintained   lighting adjusted   nonskid shoes/slippers when out of bed   safety round/check completed  Taken 2/18/2024 0700 by Latricia Linares RN  Safety Promotion/Fall Prevention: safety round/check completed     Problem: Adult Inpatient Plan of Care  Goal: Absence of Hospital-Acquired Illness or Injury  Intervention: Prevent Skin Injury  Recent Flowsheet Documentation  Taken 2/18/2024 1003 by Latricia Linares RN  Body Position:   log-rolled   weight shifting   sitting up in bed  Taken 2/18/2024 0920 by Latricia Linares RN  Body Position: weight shifting  Taken 2/18/2024 0730 by Latricia Linares RN  Body Position:   turned   left   weight shifting   upper extremity elevated     Patient A&O, VSS, and CMS intact. Denies pain. Poor appetite, does not eat much. Encouraging fluid intake throughout the day, swabbing mouth with oral sponges. Repositioning every 2 hours. Patient is weak, unable to get OOB. Plans to discharge tomorrow to [facility].  Latricia Linares RN

## 2024-02-18 NOTE — PLAN OF CARE
Problem: Oral Intake Inadequate  Goal: Improved Oral Intake  Outcome: Not Progressing     Problem: Adult Inpatient Plan of Care  Goal: Optimal Comfort and Wellbeing  Outcome: Progressing     Patient a/o to self only. On snf care, Head-to-toe only needed x1 a week, however performed x1 during my shift. VSS on RA. Pt. IV fluids SL. Patient tolerated oral medications. Patient not OOB during shift, performed Q2 turns. Pure wick used during the night, removed this AM, using a brief now. Encouraged oral and fluid intake, but patient did poorly. Intervened with oral swabs Q2H. Paged on-call, see note.  Patient denied pain during shift.  Bed alarm on for safety precautions.

## 2024-02-18 NOTE — PROGRESS NOTES
[facility]    Medicine Progress Note - Hospitalist Service    Date of Admission:  2/15/2024    Assessment & Plan    88 year old female who presented with history of dementia, CVA, hypertension, and hyperlipidemia, presenting with family complaints of hallucinations, worsening mental status.  Initial evaluation without significant lab or CXR, CT abdomen/pelvis findings.  Patient with constipation, possible rectal stool impaction.  Patient's family can no longer care for her at home, and requesting memory care/SNF placement.  Under senior living care.  Patient accepted to TCU/memory care at [facility] on Monday 2/19/24.      Severe cognitive impairment,   Alzheimer's dementia.   Hallucinations  Family unable to care for her at home.   Awaiting placement in memory care unit.   Continue senior living cares.        Principal Problem:    Failure to thrive in adult (2/16/2024)  Active Problems:    History of dementia (2/16/2024)         Diet: Combination Diet Regular Diet    DVT Prophylaxis:   Nina Catheter: Not present  Lines: None     Cardiac Monitoring: None  Code Status: Full Code      Clinically Significant Risk Factors Present on Admission                  # Hypertension: Noted on problem list          # Financial/Environmental Concerns: none         Disposition Plan     Expected Discharge Date: 02/19/2024,  9:00 AM    Destination: assisted living  Discharge Comments: TCU accepted for 02/19 after 11am.            Po Nagy MD  Hospitalist Service  [facility]  Securely message with [employer] (more info)  Text page via [employer] Paging/Directory   ______________________________________________________________________    Interval History   Pleasantly confused no new issues or concerns    Physical Exam   Vital Signs: Temp: 97.3  F (36.3  C) Temp src: Axillary BP: (!) 145/65 Pulse: 74   Resp: 19 SpO2: 100 % O2 Device: None (Room air)    Weight: 93 lbs 0  oz        Medical Decision Making       25 MINUTES SPENT BY ME on the date of service doing chart review, history, exam, documentation & further activities per the note.          Data

## 2024-02-19 NOTE — DISCHARGE SUMMARY
[facility] MEDICINE  DISCHARGE SUMMARY     Primary Care Physician: Carol Bates  Admission Date: 2/15/2024   Discharge Provider: Adam Crespo MD Discharge Date: 2/19/2024   Diet:   Active Diet and Nourishment Order   Procedures    Combination Diet Regular Diet    Diet       Code Status: Full Code   Activity: DCACTIVITY: Activity as tolerated and as guided by therapy        Condition at Discharge: Fair     REASON FOR PRESENTATION(See Admission Note for Details)     Failure to thrive    PRINCIPAL & ACTIVE DISCHARGE DIAGNOSES      Severe cognitive impairment,   Alzheimer's dementia.   Hallucinations  Recent rapid decline over prior 6 weeks  Family unable to care for her at home.   Plan: To TCU today and if she is not able to improve quickly with therapy and cooperate with therapy then she can be transition to skilled nursing facility/memory care.    Constipation  Received enema on morning of 2/16/2024  Plan: Continue new bowel meds    History of essential hypertension but blood pressures here fine off her amlodipine    History of TIA and hyperlipidemia  No recent medications for this     CODE STATUS: Full code    Disposition to memory care unit today      PENDING LABS     Unresulted Labs Ordered in the Past 30 Days of this Admission       No orders found from 1/16/2024 to 2/16/2024.              PROCEDURES ( this hospitalization only)      None    RECOMMENDATIONS TO OUTPATIENT PROVIDER FOR F/U VISIT     Follow-up Appointments     Follow Up and recommended labs and tests      Follow up with Nursing home physician.  No follow up labs or test are   needed.              DISPOSITION     TCU    SUMMARY OF HOSPITAL COURSE:      88 year old female who presented with history of dementia, CVA, hypertension, and hyperlipidemia, presenting with family complaints of hallucinations, worsening mental status.  Initial evaluation without significant lab or CXR, CT abdomen/pelvis findings.   Patient with constipation, possible rectal stool impaction.  Patient's family can no longer care for her at home, and requesting memory care/SNF placement.    According to her power of  Beena Sims patient has had a great significant decline over the past 6 weeks.  Some mild signs of improvement here in the past 24 hours.  Plan is for her to go to TCU and see if she can cooperate and improved with therapies and if that does not work then she can be transition to memory care.  Discharge Medications with Med changes:     Current Discharge Medication List        START taking these medications    Details   acetaminophen (TYLENOL) 325 MG tablet Take 2 tablets (650 mg) by mouth every 4 hours as needed for mild pain or other (and adjunct with moderate or severe pain or per patient request)    Associated Diagnoses: Generalized pain      polyethylene glycol (MIRALAX) 17 GM/Dose powder Take 17 g by mouth daily    Associated Diagnoses: Constipation, unspecified constipation type      senna-docusate (SENOKOT-S/PERICOLACE) 8.6-50 MG tablet Take 1 tablet by mouth 2 times daily    Associated Diagnoses: Constipation, unspecified constipation type           STOP taking these medications       amLODIPine (NORVASC) 2.5 MG tablet Comments:   Reason for Stopping:         amLODIPine (NORVASC) 5 MG tablet Comments:   Reason for Stopping:                     Rationale for medication changes:      Bowel meds for constipation    Discontinue amlodipine with blood pressures being good off that        Consults   CARE MANAGEMENT / SOCIAL WORK IP CONSULT  CARE MANAGEMENT / SOCIAL WORK IP CONSULT  PHYSICAL THERAPY ADULT IP CONSULT  OCCUPATIONAL THERAPY ADULT IP CONSULT    Immunizations given this encounter     Most Recent Immunizations   Administered Date(s) Administered    COVID-19 Vaccine (Ricardo) 05/04/2021    Pneumococcal 23 valent 07/17/2006    Td (Adult), Adsorbed 01/30/2009    Td,adult,historic,unspecified 01/30/2009            Anticoagulation Information      NA      SIGNIFICANT IMAGING FINDINGS     Results for orders placed or performed during the hospital encounter of 02/15/24   XR Chest 1 View    Impression    IMPRESSION:     No focal airspace disease. No pleural effusion or pneumothorax.    The cardiomediastinal silhouette is unremarkable. Aortic calcification.   CT Abdomen Pelvis w Contrast    Impression    IMPRESSION:     1.  Small to moderate amount of stool in the colon including a moderate amount in the rectum, similar to prior study, with overall stool burden having mildly increased. These findings may represent constipation versus rectal stool impaction.    2.  Stable cystic lesions in the pancreatic body and tail measuring up to 1.4 cm are stable from recent study on 01/30/2024, mildly increased from 2021. Recommend follow-up pancreatic protocol MRI in 2 years x2.       SIGNIFICANT LABORATORY FINDINGS     Most Recent 3 CBC's:  Recent Labs   Lab Test 02/16/24  0200 01/30/24  1702 05/28/21  1047   WBC 9.3 6.4 5.0   HGB 12.6 14.6 14.0   MCV 87 87 86    316 291     Most Recent 3 BMP's:  Recent Labs   Lab Test 02/16/24  0055 01/30/24  1702 05/28/21  1047    139 142   POTASSIUM 4.8 3.9 4.2   CHLORIDE 103 101 103   CO2 25 28 25   BUN 19.3 12.3 10   CR 0.80 0.83 0.88   ANIONGAP 13 10 14   KURT 10.1 10.4* 9.3   * 180* 114     Most Recent 2 LFT's:  Recent Labs   Lab Test 02/16/24  0055 01/30/24  1702 05/28/21  1047   AST  --  58* 26   ALT 97* 47 29   ALKPHOS 116 86 88   BILITOTAL 1.1 1.1 1.1*           Discharge Orders        Primary Care - Care Coordination Referral      General info for SNF    Length of Stay Estimate: Short Term Care: Estimated # of Days <30  Condition at Discharge: Improving  Level of care:skilled   Rehabilitation Potential: Fair  Admission H&P remains valid and up-to-date: Yes  Recent Chemotherapy: N/A  Use Nursing Home Standing Orders: Yes     Mantoux instructions    Give two-step Mantoux (PPD)  Per Facility Policy Yes     Follow Up and recommended labs and tests    Follow up with Nursing home physician.  No follow up labs or test are needed.     Reason for your hospital stay    Helen Napoles is a 88 year old female with a history of HTN, hyperlipidemia, TIA, Alzheimer's disease, who presented with complaints of hallucinations and worsening confusion and overall recent decline.  She had been living with a family member but they were no longer feeling like they could meet her care needs.  No clear etiology during her hospital stay but she did seem to improve a bit though often needs assist of 2 for transfers or ambulation.  Plan is for her to go to TCU and see how she responds to therapy and if that does not go well then she would be switched to long-term skilled nursing facility status.     Per family, patient has had worsening altered mental status and hallucinations over the last several weeks. Family states that she will talk to her pillow and say they are people. Prior to six weeks ago, patient was able to get up, walk around, and feed herself. However, now, patient is unable to walk herself. She has been eating less. Unsure if she has had a bowel movement recently as well. Patient has been complaining of generalized body aches. Over the last several weeks, patient has mostly been staying in bed.     Activity - Up ad clare     Activity - Up with nursing assistance     Full Code     Physical Therapy Adult Consult    Evaluate and treat as clinically indicated.    Reason: Recent decline, weakness     Occupational Therapy Adult Consult    Evaluate and treat as clinically indicated.    Reason: Recent decline     Fall precautions     Diet    Follow this diet upon discharge: Orders Placed This Encounter      Combination Diet Regular Diet       Examination   Physical Exam   Temp:  [97.2  F (36.2  C)-97.9  F (36.6  C)] 97.2  F (36.2  C)  Pulse:  [83-94] 94  Resp:  [16] 16  BP: (119-140)/(64-69) 140/69  SpO2:   [98 %] 98 %  Wt Readings from Last 1 Encounters:   02/15/24 42.2 kg (93 lb)       General Appearance: Appears very frail and laying on her side curled up in fetal position  Respiratory: Lungs clear throughout  Cardiovascular: Regular rate and rhythm without murmur  GI: Abdomen is soft and nontender  Skin: No obvious skin lesions and skin exposed areas  Extremities: Lower extremities show no edema.  Neurologic: She is alert, cranial nerves II through XII intact, able to give me her name, speech is soft and small but able to answer simple questions regarding whether or not she has any pain or dyspnea, etc.      Please see EMR for more detailed significant labs, imaging, consultant notes etc.    I, Adam Crespo MD, personally saw the patient today and spent less than or equal to 30 minutes discharging this patient.    Adam Crespo MD  [facility]    CC:Carol Bates

## 2024-02-19 NOTE — PLAN OF CARE
Problem: Adult Inpatient Plan of Care  Goal: Optimal Comfort and Wellbeing  Outcome: Progressing  Intervention: Monitor Pain and Promote Comfort  Recent Flowsheet Documentation  Taken 2/19/2024 0000 by Viola Jaramillo RN  Pain Management Interventions:   environmental changes   emotional support   care clustered   repositioned  Taken 2/18/2024 2000 by Viola Jaramillo, RN  Pain Management Interventions:   care clustered   emotional support   environmental changes   repositioned   Problem: Oral Intake Inadequate  Goal: Improved Oral Intake  Outcome: Progressing     Patient here on [facility] outpatient. A/o x1 to self only. Pt. IV fluids SL. Patient tolerated oral medications. Patient very weak, not OOB during shift.  Performed Q2 turns and oral care. Encouraged PO fluid intake, with use of green oral sponges. Patient is incontinent of urine and stool, had x1 scant BM and x1 small BM during shift. Per PAINAD, score of 0 for pain. Plan to discharge on 2/19 to [facility].  Bed alarm on for safety precautions.

## 2024-02-19 NOTE — PLAN OF CARE
Note from 0700 to discharge. AVS sent with transport to give to TCU. IV access discontinued. No issues noted.    Taylor R Schoenecker, RN

## 2024-02-19 NOTE — PROGRESS NOTES
Care Management Discharge Note    Discharge Date: 02/19/2024       Discharge Disposition: Assisted Living, Transitional Care (TBD further) [facility]     Discharge Transportation: agency      PAS Confirmation Code: [id number]  Patient/family educated on Medicare website which has current facility and service quality ratings: yes    Education Provided on the Discharge Plan: Yes (Assessment and service coordination process explained to [facility])  Patient/Family in Agreement with the Plan: yes    Additional Information:  CM called TCU and reached  at 8AM.   Provider updated CM about discharge for today pending placement and pending orders.   CM called transport and was able to get a ride via STR as needed for 1:42 - 2:20 PM  window with [employer] transport. CM called Beena SUMMERS and reached . CM called TCU at 9:35 AM and reached  and left message stating ride time and requested a call back.     11:48 AM  CM spoke with Beena the pt's POA who agrees with possible private pay for TCU and transport as needed. Beena stated that she plans to visit the pt at TCU around 3:30PM today. CM called and spoke with Mindy with [facility] and she stated that the time works and that she will update Step at the TCU hub.     PCS done and on chart as needed.     JOSÉ Hall

## 2024-02-21 NOTE — LETTER
2/21/2024        RE: Helen Napoles  [address]  [facility] [address]        [facility] GERIATRICS    PRIMARY CARE PROVIDER AND CLINIC:  Carol Bates MD, [address] [place] [address] / [facility] [address]  Chief Complaint   Patient presents with     Hospital F/U      [facility] Medical Record Number:  [id number]  Place of Service where encounter took place:  [facility] (U) [[id number]]    Helen Napoles  is a 88 year old  (1935), admitted to the above facility from  [facility]. Hospital stay 2/15/24 through 2/19/24. Patient with PMH dementia, HTN, HLD, TIA was brought to the ED by family due to rapid decline over several weeks and inability to care for her at home. No acute illness found. She discharged to TCU, but if unable to make progress, will stay LTC.     HPI obtained from patient visit, review of nursing home record, discussion with facility staff, and Epic review.     HPI:    Patient is laying curled up in bed. Did not arouse to voice, provider did not pursue further. Staff report no acute concerns    CODE STATUS/ADVANCE DIRECTIVES DISCUSSION:  No CPR- Do NOT Intubate    ALLERGIES:   Allergies   Allergen Reactions     Bethanechol      Hydroxyzine Other (See Comments)     Extreme sedation      Pentazocine Unknown      PAST MEDICAL HISTORY:   Past Medical History:   Diagnosis Date     Anxiety      Benign essential HTN      Cataract      Cerebral vascular accident (H)     tia     Cervical spondylosis      Cervicalgia      Chest pain      Family history of myocardial infarction     Mom 58     Fatigue      Headache      High cholesterol      HLD (hyperlipidemia)      Limb pain      Osteoarthritis      Osteoarthritis      Osteopenia      Palpitations       PAST SURGICAL HISTORY:   has a past surgical history that includes TOTAL ABDOM HYSTERECTOMY and Oophorectomy.  FAMILY HISTORY: family history includes Cancer (age of onset: 60.00) in her mother;  "Cerebrovascular Disease in her father; Heart Disease in her mother; Ovarian Cancer (age of onset: 22.00) in her maternal aunt.  SOCIAL HISTORY:   reports that she has never smoked. She has never used smokeless tobacco. She reports that she does not drink alcohol and does not use drugs.    Post Discharge Medication Reconciliation Status:   MED REC REQUIRED  Post Medication Reconciliation Status:  Discharge medications reconciled, continue medications without change       Current Outpatient Medications   Medication Sig     acetaminophen (TYLENOL) 325 MG tablet Take 2 tablets (650 mg) by mouth every 4 hours as needed for mild pain or other (and adjunct with moderate or severe pain or per patient request)     polyethylene glycol (MIRALAX) 17 GM/Dose powder Take 17 g by mouth daily     senna-docusate (SENOKOT-S/PERICOLACE) 8.6-50 MG tablet Take 1 tablet by mouth 2 times daily     No current facility-administered medications for this visit.       ROS:  Unobtainable secondary to cognitive impairment/sleeping     Vitals:  /50   Pulse 70   Temp (!) 96.5  F (35.8  C)   Resp 18   Ht 1.473 m (4' 10\")   Wt 35.7 kg (78 lb 9.6 oz)   SpO2 97%   BMI 16.43 kg/m    Exam:  GENERAL APPEARANCE:  Sleeping, no apparent distress, cachectic  RESP:  no respiratory distress    Lab/Diagnostic data:  Recent labs in [employer] reviewed by me today.     ASSESSMENT/PLAN:  (R62.7) Adult failure to thrive  (primary encounter diagnosis)  (G30.1,  F02.C0) Severe late onset Alzheimer's dementia without behavioral disturbance, psychotic disturbance, mood disturbance, or anxiety (H)  Comment: Patient appears to be a poor candidate for rehab. Expect she will stay LTC and continue to decline rapidly. Life expectancy < 6 months. Once therapy ends, could offer hospice    (I10) Primary hypertension  Comment: BP goals are ~130 -165/60 -90 mmHg.This is higher than ACC and AHA recommendations due to goals of care, risk for hypotension, risk of dizziness " and falls, risk of tissue/cerebral hypoperfusion, and frailty. Patient is stable and continue without pharmacological invention with routine assessment.      Electronically signed by:  SOPHIA Peck CNP                     Sincerely,        SOPHIA Peck CNP

## 2024-02-21 NOTE — PROGRESS NOTES
[facility] GERIATRICS    PRIMARY CARE PROVIDER AND CLINIC:  Carol Bates MD, [address] [place] [address] / [facility] [address]  Chief Complaint   Patient presents with    Hospital F/U      [facility] Medical Record Number:  [id number]  Place of Service where encounter took place:  [facility] (U) [[id number]]    Helen Napoles  is a 88 year old  (1935), admitted to the above facility from  [facility]. Hospital stay 2/15/24 through 2/19/24. Patient with PMH dementia, HTN, HLD, TIA was brought to the ED by family due to rapid decline over several weeks and inability to care for her at home. No acute illness found. She discharged to TCU, but if unable to make progress, will stay LTC.     HPI obtained from patient visit, review of nursing home record, discussion with facility staff, and Epic review.     HPI:    Patient is laying curled up in bed. Did not arouse to voice, provider did not pursue further. Staff report no acute concerns    CODE STATUS/ADVANCE DIRECTIVES DISCUSSION:  No CPR- Do NOT Intubate    ALLERGIES:   Allergies   Allergen Reactions    Bethanechol     Hydroxyzine Other (See Comments)     Extreme sedation     Pentazocine Unknown      PAST MEDICAL HISTORY:   Past Medical History:   Diagnosis Date    Anxiety     Benign essential HTN     Cataract     Cerebral vascular accident (H)     tia    Cervical spondylosis     Cervicalgia     Chest pain     Family history of myocardial infarction     Mom 58    Fatigue     Headache     High cholesterol     HLD (hyperlipidemia)     Limb pain     Osteoarthritis     Osteoarthritis     Osteopenia     Palpitations       PAST SURGICAL HISTORY:   has a past surgical history that includes TOTAL ABDOM HYSTERECTOMY and Oophorectomy.  FAMILY HISTORY: family history includes Cancer (age of onset: 60.00) in her mother; Cerebrovascular Disease in her father; Heart Disease in her mother; Ovarian Cancer (age of onset: 22.00) in her maternal  "aunt.  SOCIAL HISTORY:   reports that she has never smoked. She has never used smokeless tobacco. She reports that she does not drink alcohol and does not use drugs.    Post Discharge Medication Reconciliation Status:   MED REC REQUIRED  Post Medication Reconciliation Status:  Discharge medications reconciled, continue medications without change       Current Outpatient Medications   Medication Sig    acetaminophen (TYLENOL) 325 MG tablet Take 2 tablets (650 mg) by mouth every 4 hours as needed for mild pain or other (and adjunct with moderate or severe pain or per patient request)    polyethylene glycol (MIRALAX) 17 GM/Dose powder Take 17 g by mouth daily    senna-docusate (SENOKOT-S/PERICOLACE) 8.6-50 MG tablet Take 1 tablet by mouth 2 times daily     No current facility-administered medications for this visit.       ROS:  Unobtainable secondary to cognitive impairment/sleeping     Vitals:  /50   Pulse 70   Temp (!) 96.5  F (35.8  C)   Resp 18   Ht 1.473 m (4' 10\")   Wt 35.7 kg (78 lb 9.6 oz)   SpO2 97%   BMI 16.43 kg/m    Exam:  GENERAL APPEARANCE:  Sleeping, no apparent distress, cachectic  RESP:  no respiratory distress    Lab/Diagnostic data:  Recent labs in [facility] reviewed by me today.     ASSESSMENT/PLAN:  (R62.7) Adult failure to thrive  (primary encounter diagnosis)  (G30.1,  F02.C0) Severe late onset Alzheimer's dementia without behavioral disturbance, psychotic disturbance, mood disturbance, or anxiety (H)  Comment: Patient appears to be a poor candidate for rehab. Expect she will stay LTC and continue to decline rapidly. Life expectancy < 6 months. Once therapy ends, could offer hospice    (I10) Primary hypertension  Comment: BP goals are ~130 -165/60 -90 mmHg.This is higher than ACC and AHA recommendations due to goals of care, risk for hypotension, risk of dizziness and falls, risk of tissue/cerebral hypoperfusion, and frailty. Patient is stable and continue without pharmacological " invention with routine assessment.      Electronically signed by:  SOPHIA Peck CNP

## 2024-02-22 PROBLEM — G30.1 SEVERE LATE ONSET ALZHEIMER'S DEMENTIA WITHOUT BEHAVIORAL DISTURBANCE, PSYCHOTIC DISTURBANCE, MOOD DISTURBANCE, OR ANXIETY (H): Status: ACTIVE | Noted: 2024-01-01

## 2024-02-22 PROBLEM — F02.C0 SEVERE LATE ONSET ALZHEIMER'S DEMENTIA WITHOUT BEHAVIORAL DISTURBANCE, PSYCHOTIC DISTURBANCE, MOOD DISTURBANCE, OR ANXIETY (H): Status: ACTIVE | Noted: 2024-01-01

## 2024-03-01 NOTE — TELEPHONE ENCOUNTER
[facility] Geriatrics Triage Nurse Telephone Encounter    Provider: SOPHIA Serrano CNP   Facility: [facility] Facility Type:  TCU    Caller: Faby    Allergies:    Allergies   Allergen Reactions    Bethanechol     Hydroxyzine Other (See Comments)     Extreme sedation     Pentazocine Unknown        Reason for call: Pt having loose stools. Not watery and no foul odor, not on abx. Senna and Miralax were held this morning. Requesting to change Senna to PRN.    Verbal Order/Direction given by Provider:   - discontinue scheduled Senna  - Senna S 1 tab PO BID PRN for constipation    Provider giving Order:  SOPHIA Serrano CNP     Verbal Order given to: Faby Luque RN

## 2024-03-04 NOTE — LETTER
3/4/2024        RE: Helen Napoles  [address]  [facility] [address]        M [facility] GERIATRICS  INITIAL VISIT NOTE  March 4, 2024      PRIMARY CARE PROVIDER AND CLINIC:  Carol Bates [address] [place] [address] / [facility] [address]    M [facility] Medical Record Number:  [id number]  Place of Service where encounter took place:  [facility] (U) [[id number]]      Chief Complaint   Patient presents with     Hospital F/U       HPI:    Helen Napoles is a 88 year old  (1935) female seen today at [facility]. Medical history is notable for CVA, HTN, HLD and dementia. She was hospitalized at [facility] from 2/15/24 to 2/19/24 where she presented with worsening mental status, hallucinations and need for higher level of care than family can provide. PTA amlodipine discontinued. She was admitted to this facility for  rehab, medical management, and nursing care.      History obtained from: facility chart records, facility staff, [facility] chart review, and Care Everywhere [facility] chart review.      Today, Ms. Napoles is seen sitting in a chair in the common room. She was looking out over the milieu. BIMS 0/15. Talked with nursing. She's settling in well. Appears comfortable. Seems to enjoy being around others. No acut concerns today per discussion with nursing.     CODE STATUS: DNR / DNI    ALLERGIES:  Allergies   Allergen Reactions     Bethanechol      Hydroxyzine Other (See Comments)     Extreme sedation      Pentazocine Unknown       PAST MEDICAL HISTORY:   Past Medical History:   Diagnosis Date     Anxiety      Benign essential HTN      Cataract      Cerebral vascular accident (H)     tia     Cervical spondylosis      Cervicalgia      Chest pain      Family history of myocardial infarction     Mom 58     Fatigue      Headache      High cholesterol      HLD (hyperlipidemia)      Limb pain      Osteoarthritis      Osteoarthritis      Osteopenia      Palpitations   "      PAST SURGICAL HISTORY:   Past Surgical History:   Procedure Laterality Date     OOPHORECTOMY      one ovary     ZZC TOTAL ABDOM HYSTERECTOMY      Description: Hysterectomy;  Recorded: 08/02/2007;       FAMILY HISTORY:   Family History   Problem Relation Age of Onset     Cancer Mother 60.00        Lung     Heart Disease Mother      Cerebrovascular Disease Father      Ovarian Cancer Maternal Aunt 22.00       SOCIAL HISTORY:   Patient's living condition: lives in a skilled nursing facility    MEDICATIONS:  Post Discharge Medication Reconciliation Status: discharge medications reconciled and changed, per note/orders.  Current Outpatient Medications   Medication Sig Dispense Refill     acetaminophen (TYLENOL) 325 MG tablet Take 2 tablets (650 mg) by mouth every 4 hours as needed for mild pain or other (and adjunct with moderate or severe pain or per patient request)       polyethylene glycol (MIRALAX) 17 GM/Dose powder Take 17 g by mouth daily       senna-docusate (SENOKOT-S/PERICOLACE) 8.6-50 MG tablet Take 1 tablet by mouth 2 times daily as needed for constipation         ROS:  Unable to obtain due to cognitive impairment or aphasia. BIMS 0/15.     PHYSICAL EXAM:  /74   Pulse 81   Temp 96.8  F (36  C)   Resp 18   Ht 1.473 m (4' 10\")   Wt 36.9 kg (81 lb 6.4 oz)   SpO2 97%   BMI 17.01 kg/m    Gen: sitting in chair, alert and in no acute distress  Resp: breathing non labored, no tachypnea  Ext: no LE edema  Neuro: CX II-XII grossly in tact; ROM in all four extremities grossly in tact  Psych: memory, judgement and insight impaired     LABORATORY/IMAGING DATA:  Reviewed as per [facility] and/or [facility]    ASSESSMENT/PLAN:    Dementia Without Behavioral Disturbance  Failure to Thrive  Was having hallucinations prior to hospitalization. Needed higher level of care with trial of TCU before likely LTC. BIMS 0/15.   -- OT following    HTN  SBPs 110s. Amlodipine discontinued during hospitalization.   -- follow " BPs and add medications as needed - thus far BP controlled without anti-HTN meds    Slow Transit Constipation  -- Miralax 17g daily and Senna-S 1 tab BID PRN  -- adjust bowel regimen as needed    Physical Deconditioning  In setting of hospitalization and underlying medical conditions  -- ongoing PT/OT      Electronically signed by:  Kimmie Amador MD                      Sincerely,        Kimmie Amador MD

## 2024-03-04 NOTE — PROGRESS NOTES
[facility] GERIATRICS  INITIAL VISIT NOTE  March 4, 2024      PRIMARY CARE PROVIDER AND CLINIC:  Carol Bates [address] [place] [address] / [facility] [address]    [facility] Medical Record Number:  [id number]  Place of Service where encounter took place:  [facility] ([facility]) [[id number]]      Chief Complaint   Patient presents with    Hospital F/U       HPI:    Helen Napoles is a 88 year old  (1935) female seen today at [facility]. Medical history is notable for CVA, HTN, HLD and dementia. She was hospitalized at [facility] from 2/15/24 to 2/19/24 where she presented with worsening mental status, hallucinations and need for higher level of care than family can provide. PTA amlodipine discontinued. She was admitted to this facility for  rehab, medical management, and nursing care.      History obtained from: facility chart records, facility staff, [facility] chart review, and Care Everywhere [facility] chart review.      Today, Ms. Napoles is seen sitting in a chair in the common room. She was looking out over the milieu. BIMS 0/15. Talked with nursing. She's settling in well. Appears comfortable. Seems to enjoy being around others. No acut concerns today per discussion with nursing.     CODE STATUS: DNR / DNI    ALLERGIES:  Allergies   Allergen Reactions    Bethanechol     Hydroxyzine Other (See Comments)     Extreme sedation     Pentazocine Unknown       PAST MEDICAL HISTORY:   Past Medical History:   Diagnosis Date    Anxiety     Benign essential HTN     Cataract     Cerebral vascular accident (H)     tia    Cervical spondylosis     Cervicalgia     Chest pain     Family history of myocardial infarction     Mom 58    Fatigue     Headache     High cholesterol     HLD (hyperlipidemia)     Limb pain     Osteoarthritis     Osteoarthritis     Osteopenia     Palpitations        PAST SURGICAL HISTORY:   Past Surgical History:   Procedure Laterality Date    OOPHORECTOMY      one ovary    [facility]  "TOTAL ABDOM HYSTERECTOMY      Description: Hysterectomy;  Recorded: 08/02/2007;       FAMILY HISTORY:   Family History   Problem Relation Age of Onset    Cancer Mother 60.00        Lung    Heart Disease Mother     Cerebrovascular Disease Father     Ovarian Cancer Maternal Aunt 22.00       SOCIAL HISTORY:   Patient's living condition: lives in a skilled nursing facility    MEDICATIONS:  Post Discharge Medication Reconciliation Status: discharge medications reconciled and changed, per note/orders.  Current Outpatient Medications   Medication Sig Dispense Refill    acetaminophen (TYLENOL) 325 MG tablet Take 2 tablets (650 mg) by mouth every 4 hours as needed for mild pain or other (and adjunct with moderate or severe pain or per patient request)      polyethylene glycol (MIRALAX) 17 GM/Dose powder Take 17 g by mouth daily      senna-docusate (SENOKOT-S/PERICOLACE) 8.6-50 MG tablet Take 1 tablet by mouth 2 times daily as needed for constipation         ROS:  Unable to obtain due to cognitive impairment or aphasia. BIMS 0/15.     PHYSICAL EXAM:  /74   Pulse 81   Temp 96.8  F (36  C)   Resp 18   Ht 1.473 m (4' 10\")   Wt 36.9 kg (81 lb 6.4 oz)   SpO2 97%   BMI 17.01 kg/m    Gen: sitting in chair, alert and in no acute distress  Resp: breathing non labored, no tachypnea  Ext: no LE edema  Neuro: CX II-XII grossly in tact; ROM in all four extremities grossly in tact  Psych: memory, judgement and insight impaired     LABORATORY/IMAGING DATA:  Reviewed as per [facility] and/or [facility]    ASSESSMENT/PLAN:    Dementia Without Behavioral Disturbance  Failure to Thrive  Was having hallucinations prior to hospitalization. Needed higher level of care with trial of TCU before likely LTC. BIMS 0/15.   -- OT following    HTN  SBPs 110s. Amlodipine discontinued during hospitalization.   -- follow BPs and add medications as needed - thus far BP controlled without anti-HTN meds    Slow Transit Constipation  -- Miralax 17g " daily and Senna-S 1 tab BID PRN  -- adjust bowel regimen as needed    Physical Deconditioning  In setting of hospitalization and underlying medical conditions  -- ongoing PT/OT      Electronically signed by:  Kimmie Amador MD

## 2024-04-05 NOTE — PROGRESS NOTES
[facility] GERIATRICS  Chief Complaint   Patient presents with    [facility] Regulatory     [place] Medical Record Number:  [id number]  Place of Service where encounter took place:  [facility] () [[id number]]    HPI:    Helen Napoles  is 88 year old (1935), who is being seen today for a federally mandated E/M visit.     Today's concerns are:  Failure to thrive in adult  Severe late onset Alzheimer's dementia without behavioral disturbance, psychotic disturbance, mood disturbance, or anxiety (H)  Primary hypertension  Patient is sitting at the table, fiddling with things on her breakfast tray. She does not have any verbal response. She has had a few falls from her wheelchair, consistently when she is leaning forward for something. She has gained about 5 lbs from admission.      ALLERGIES:Bethanechol, Hydroxyzine, and Pentazocine  PAST MEDICAL HISTORY:   Past Medical History:   Diagnosis Date    Anxiety     Benign essential HTN     Cataract     Cerebral vascular accident (H)     tia    Cervical spondylosis     Cervicalgia     Chest pain     Family history of myocardial infarction     Mom 58    Fatigue     Headache     High cholesterol     HLD (hyperlipidemia)     Limb pain     Osteoarthritis     Osteoarthritis     Osteopenia     Palpitations      PAST SURGICAL HISTORY:   has a past surgical history that includes TOTAL ABDOM HYSTERECTOMY and Oophorectomy.  FAMILY HISTORY: family history includes Cancer (age of onset: 60.00) in her mother; Cerebrovascular Disease in her father; Heart Disease in her mother; Ovarian Cancer (age of onset: 22.00) in her maternal aunt.  SOCIAL HISTORY:  reports that she has never smoked. She has never used smokeless tobacco. She reports that she does not drink alcohol and does not use drugs.    MEDICATIONS:  Current Outpatient Medications   Medication Sig Dispense Refill    acetaminophen (TYLENOL) 325 MG tablet Take 2 tablets (650 mg) by mouth every 4 hours as  "needed for mild pain or other (and adjunct with moderate or severe pain or per patient request)      polyethylene glycol (MIRALAX) 17 GM/Dose powder Take 17 g by mouth daily      senna-docusate (SENOKOT-S/PERICOLACE) 8.6-50 MG tablet Take 1 tablet by mouth 2 times daily as needed for constipation         Case Management:  I have reviewed the care plan and MDS and do agree with the plan. Information reviewed:  Medications, vital signs, orders, and nursing notes.    ROS:  Unobtainable secondary to cognitive impairment.     Vitals:  BP (!) 154/84   Pulse 89   Temp 98.4  F (36.9  C)   Resp 18   Ht 1.473 m (4' 10\")   Wt 37.8 kg (83 lb 6.4 oz)   SpO2 97%   BMI 17.43 kg/m    Body mass index is 17.43 kg/m .  Exam:  GENERAL APPEARANCE:  Alert, in no distress, thin  EYES:  Conjunctiva and lids normal  RESP:  no respiratory distress  CV:  no edema  SKIN:  Inspection of skin and subcutaneous tissue baseline  PSYCH:  somewhat fidgety, but appears calm      ASSESSMENT/PLAN  (R62.7) Failure to thrive in adult  (primary encounter diagnosis)  (G30.1,  F02.C0) Severe late onset Alzheimer's dementia without behavioral disturbance, psychotic disturbance, mood disturbance, or anxiety (H)  Comment: Patient has had some improvement with 24 hour care, at least with her intake and weight. Her falls are difficult to prevent, staff have implemented changes to her chair. Would not recommend adding any psychotropic medication as she does not appear to be in distress  Plan: Continue current POC with no changes at this time and adjustments as needed.    (I10) Primary hypertension  Comment: Most BP readings 100-110s/50-70s. To avoid risk of hypotension, falls, dizziness and tissue hypoperfusion, recommend  BP goal is < 150/90mmHg.  Plan: Continue without pharmacological invention with routine assessment.          Electronically signed by:  SOPHIA Peck CNP        "

## 2024-04-05 NOTE — LETTER
4/5/2024        RE: Helen Napoles  [address]  [facility] [address]        [facility] GERIATRICS  Chief Complaint   Patient presents with     skilled nursing Regulatory     [place] Medical Record Number:  [id number]  Place of Service where encounter took place:  [facility] () [[id number]]    HPI:    Helen Napoles  is 88 year old (1935), who is being seen today for a federally mandated E/M visit.     Today's concerns are:  Failure to thrive in adult  Severe late onset Alzheimer's dementia without behavioral disturbance, psychotic disturbance, mood disturbance, or anxiety (H)  Primary hypertension  Patient is sitting at the table, fiddling with things on her breakfast tray. She does not have any verbal response. She has had a few falls from her wheelchair, consistently when she is leaning forward for something. She has gained about 5 lbs from admission.      ALLERGIES:Bethanechol, Hydroxyzine, and Pentazocine  PAST MEDICAL HISTORY:   Past Medical History:   Diagnosis Date     Anxiety      Benign essential HTN      Cataract      Cerebral vascular accident (H)     tia     Cervical spondylosis      Cervicalgia      Chest pain      Family history of myocardial infarction     Mom 58     Fatigue      Headache      High cholesterol      HLD (hyperlipidemia)      Limb pain      Osteoarthritis      Osteoarthritis      Osteopenia      Palpitations      PAST SURGICAL HISTORY:   has a past surgical history that includes TOTAL ABDOM HYSTERECTOMY and Oophorectomy.  FAMILY HISTORY: family history includes Cancer (age of onset: 60.00) in her mother; Cerebrovascular Disease in her father; Heart Disease in her mother; Ovarian Cancer (age of onset: 22.00) in her maternal aunt.  SOCIAL HISTORY:  reports that she has never smoked. She has never used smokeless tobacco. She reports that she does not drink alcohol and does not use drugs.    MEDICATIONS:  Current Outpatient Medications   Medication Sig  "Dispense Refill     acetaminophen (TYLENOL) 325 MG tablet Take 2 tablets (650 mg) by mouth every 4 hours as needed for mild pain or other (and adjunct with moderate or severe pain or per patient request)       polyethylene glycol (MIRALAX) 17 GM/Dose powder Take 17 g by mouth daily       senna-docusate (SENOKOT-S/PERICOLACE) 8.6-50 MG tablet Take 1 tablet by mouth 2 times daily as needed for constipation         Case Management:  I have reviewed the care plan and MDS and do agree with the plan. Information reviewed:  Medications, vital signs, orders, and nursing notes.    ROS:  Unobtainable secondary to cognitive impairment.     Vitals:  BP (!) 154/84   Pulse 89   Temp 98.4  F (36.9  C)   Resp 18   Ht 1.473 m (4' 10\")   Wt 37.8 kg (83 lb 6.4 oz)   SpO2 97%   BMI 17.43 kg/m    Body mass index is 17.43 kg/m .  Exam:  GENERAL APPEARANCE:  Alert, in no distress, thin  EYES:  Conjunctiva and lids normal  RESP:  no respiratory distress  CV:  no edema  SKIN:  Inspection of skin and subcutaneous tissue baseline  PSYCH:  somewhat fidgety, but appears calm      ASSESSMENT/PLAN  (R62.7) Failure to thrive in adult  (primary encounter diagnosis)  (G30.1,  F02.C0) Severe late onset Alzheimer's dementia without behavioral disturbance, psychotic disturbance, mood disturbance, or anxiety (H)  Comment: Patient has had some improvement with 24 hour care, at least with her intake and weight. Her falls are difficult to prevent, staff have implemented changes to her chair. Would not recommend adding any psychotropic medication as she does not appear to be in distress  Plan: Continue current POC with no changes at this time and adjustments as needed.    (I10) Primary hypertension  Comment: Most BP readings 100-110s/50-70s. To avoid risk of hypotension, falls, dizziness and tissue hypoperfusion, recommend  BP goal is < 150/90mmHg.  Plan: Continue without pharmacological invention with routine assessment.          Electronically " signed by:  SOPHIA Peck CNP            Sincerely,        Nicci Briggs, SOPHIA CNP

## 2024-04-11 NOTE — LETTER
"    4/11/2024        RE: Helen Napoles  [address]  [facility] [address]        M [facility] GERIATRICS    Chief Complaint   Patient presents with     Nursing Home Acute     HPI:  Helen Napoles is a 88 year old (1935) female, who is being seen today for an episodic care visit at: [facility] () [[id number]]. PMH includes late onset Alzheimer's disease, failure to thrive, nonverbal, hypertension.    Today's concern is: L knee pain- guarding, pain, swelling, warmth, redness.     Helen has a history of recent falls secondary to advancing dementia.  Last fall was about 1 week ago on April 3.  She is now presenting with left knee pain and swelling over the last 48 hours.  Pain was not noted after initial falls however falls were unwitnessed.  Nursing says she they have noticed guarding with ADLs/turning and maneuvering.  On exam, left knee has an effusion, mild warmth, and flinch and grimace with palpation.  She does remain with her eyes closed.  She is nonverbal.    Allergies, and PMH/PSH reviewed in EPIC today.  REVIEW OF SYSTEMS:  Unobtainable secondary to aphasia.     Objective:   BP (!) 160/80   Pulse 89   Temp 97  F (36.1  C)   Resp 18   Ht 1.473 m (4' 10\")   Wt 37.6 kg (83 lb)   SpO2 95%   BMI 17.35 kg/m    General appearance: alert, cooperative.   Lungs: respirations unlabored.  Extremities: Left knee with effusion.  Skin: No rashes or lesions  Neurologic: oriented. No focal deficits.     Labs done in SNF are in [place] EPIC. Please refer to them using [facility]/Care Everywhere.    Assessment/Plan:  1. Knee pain    2. Swelling    3. Dementia, unspecified dementia severity, unspecified dementia type, unspecified whether behavioral, psychotic, or mood disturbance or anxiety (H)    4. Severe late onset Alzheimer's dementia without behavioral disturbance, psychotic disturbance, mood disturbance, or anxiety (H)    5. Failure to thrive in adult      If x-ray positive for fracture, " anticipate including orthopedic NP for conservative management onsite due to patient's advanced dementia, nonambulatory status.    Orders:  -Okay for 2 view x-ray left knee.  -Start ice 20 minutes at a time 4 times daily x 72 hours.  -Okay for supportive Ace wrap in place.  -Continue Tylenol for analgesia.  -Uric acid and CRP r/o gout/pseudogout.    Electronically signed by: Lacie Crawford CNP           Sincerely,        Lacie Crawford, CNP

## 2024-04-11 NOTE — TELEPHONE ENCOUNTER
[facility] Triage Nurse Telephone Encounter    Provider: SOPHIA Serrano CNP   Facility: [facility] Facility Type:  [facility]    Caller: Ember   Call Back Number: [phone]    Allergies:    Allergies   Allergen Reactions    Bethanechol     Hydroxyzine Other (See Comments)     Extreme sedation     Pentazocine Unknown        Reason for call: Pt appears to be having left knee pain. Knee is swollen. Pt has had several falls from her wheelchair and not always witnessed. She is guarding the knee and wincing when touched.    Verbal Order/Direction given by Provider: 2 view xray to left knee    Provider giving Order:  AUDIE Barrera    Verbal Order given to: Ember Ibarra RN

## 2024-04-11 NOTE — PROGRESS NOTES
"[facility] GERIATRICS    Chief Complaint   Patient presents with    Nursing Home Acute     HPI:  Helen Napoles is a 88 year old (1935) female, who is being seen today for an episodic care visit at: [facility] () [[id number]]. PMH includes late onset Alzheimer's disease, failure to thrive, nonverbal, hypertension.    Today's concern is: L knee pain- guarding, pain, swelling, warmth, redness.     Helen has a history of recent falls secondary to advancing dementia.  Last fall was about 1 week ago on April 3.  She is now presenting with left knee pain and swelling over the last 48 hours.  Pain was not noted after initial falls however falls were unwitnessed.  Nursing says she they have noticed guarding with ADLs/turning and maneuvering.  On exam, left knee has an effusion, mild warmth, and flinch and grimace with palpation.  She does remain with her eyes closed.  She is nonverbal.    Allergies, and PMH/PSH reviewed in EPIC today.  REVIEW OF SYSTEMS:  Unobtainable secondary to aphasia.     Objective:   BP (!) 160/80   Pulse 89   Temp 97  F (36.1  C)   Resp 18   Ht 1.473 m (4' 10\")   Wt 37.6 kg (83 lb)   SpO2 95%   BMI 17.35 kg/m    General appearance: alert, cooperative.   Lungs: respirations unlabored.  Extremities: Left knee with effusion.  Skin: No rashes or lesions  Neurologic: oriented. No focal deficits.     Labs done in SNF are in [place] EPIC. Please refer to them using EPIC/Care Everywhere.    Assessment/Plan:  1. Knee pain    2. Swelling    3. Dementia, unspecified dementia severity, unspecified dementia type, unspecified whether behavioral, psychotic, or mood disturbance or anxiety (H)    4. Severe late onset Alzheimer's dementia without behavioral disturbance, psychotic disturbance, mood disturbance, or anxiety (H)    5. Failure to thrive in adult      If x-ray positive for fracture, anticipate including orthopedic NP for conservative management onsite due to patient's " advanced dementia, nonambulatory status.    Orders:  -Okay for 2 view x-ray left knee.  -Start ice 20 minutes at a time 4 times daily x 72 hours.  -Okay for supportive Ace wrap in place.  -Continue Tylenol for analgesia.  -Uric acid and CRP r/o gout/pseudogout.    Electronically signed by: Lacie Crawford, DREAD

## 2024-04-12 NOTE — TELEPHONE ENCOUNTER
[facility] Geriatrics Lab Note     Provider: SOPHIA Serrano CNP   Facility: [facility] Facility Type:  LT    Allergies   Allergen Reactions    Bethanechol     Hydroxyzine Other (See Comments)     Extreme sedation     Pentazocine Unknown       Labs Reviewed by provider: CRP and uric acid    Verbal Order/Direction given by Provider: Prednisone 40mg po daily x 5 days. Continue supportive care with ice, rest, ace wrap. PPX reviewed and no acute fx seen. Monitor for fever and redness.    Provider giving Order:  AUDIE Barrera    Verbal Order given to: Gin Ibarra RN

## 2024-04-12 NOTE — RESULT ENCOUNTER NOTE
Start prednisone 40mg po daily x 5 days. Continue supportive care with ice, rest, ace wrap. PPX reviewed and no acute fx seen. Monitor for fever, redness,

## 2024-05-01 NOTE — LETTER
5/1/2024        RE: Helen Napoles  [address]  [place] [address]        [facility] GERIATRICS  REGULATORY VISIT  May 1, 2024    [facility] Medical Record Number:  [id number]  Place of Service where encounter took place:  [facility] () [[id number]]    Chief Complaint   Patient presents with     snf Regulatory       HPI:    Helen Napoles is a 88 year old  (1935), who is being seen today for a federally mandated E/M visit at [facility] where she has resided since February after a hospitalization with hallucinations and need for higher level of care than family can provide.     Today, Ms. Napoles is seen sitting in her wheelchair self propelling with her left leg. She is a limited historian due to dementia. Oriented to self. Tells me no aches or pains. No acute concerns today per discussion with nursing.     ALLERGIES:    Allergies   Allergen Reactions     Bethanechol      Hydroxyzine Other (See Comments)     Extreme sedation      Pentazocine Unknown        Past Medical, Surgical, Family and Social History: Reviewed and updated in EPIC.    MEDICATIONS:  Current Outpatient Medications   Medication Sig Dispense Refill     acetaminophen (TYLENOL) 325 MG tablet Take 2 tablets (650 mg) by mouth every 4 hours as needed for mild pain or other (and adjunct with moderate or severe pain or per patient request)       polyethylene glycol (MIRALAX) 17 GM/Dose powder Take 17 g by mouth daily       senna-docusate (SENOKOT-S/PERICOLACE) 8.6-50 MG tablet Take 1 tablet by mouth 2 times daily as needed for constipation       Medications reviewed:  Medications reconciled to facility chart and changes were made to reflect current medications as identified as above med list. Below are the changes that were made:   Medications stopped since last EPIC medication reconciliation:   There are no discontinued medications.  Medications started since last EPIC medication  "reconciliation:  No orders of the defined types were placed in this encounter.      REVIEW OF SYSTEMS:  Unable to be obtained due to cognitive impairment or aphasia.     PHYSICAL EXAM:  BP (!) 143/73   Pulse 89   Temp (!) 95.3  F (35.2  C)   Resp 18   Ht 1.473 m (4' 10\")   Wt 37.4 kg (82 lb 6.4 oz)   SpO2 95%   BMI 17.22 kg/m    Gen: sitting in wheelchair, alert, cooperative and in no acute distress  Resp: breathing non labored, no tachypnea   Ext: no LE edema  Neuro: CX II-XII grossly in tact; ROM in all four extremities grossly in tact  Psych: memory, judgement and insight impaired     LABS/IMAGING: Reviewed as per Epic and/or [facility]    ASSESSMENT / PLAN:    HTN  SBPs labile 100s-140s, most 100s-110s. Amlodipine discontinued during hospitalization and no indication for starting anti-HTN medications today.   -- follow BPs and add medications as needed    Dementia Without Behavioral Disturbance  Failure to Thrive  Was having hallucinations prior to hospitalization. Needed higher level of care. Has settled in well. BIMS 0/15. Weight stable around 82 lbs.   -- ongoing 24/7 nursing and supportive cares     Slow Transit Constipation  -- Miralax 17g daily and Senna-S 1 tab BID PRN  -- adjust bowel regimen as needed    Electronically signed by  Kimmie Amador MD              Sincerely,        Kimmie Amador MD      "

## 2024-05-01 NOTE — PROGRESS NOTES
[facility] GERIATRICS  REGULATORY VISIT  May 1, 2024    [facility] Medical Record Number:  [id number]  Place of Service where encounter took place:  [facility] () [[id number]]    Chief Complaint   Patient presents with    penitentiary Regulatory       HPI:    Helen Napoles is a 88 year old  (1935), who is being seen today for a federally mandated E/M visit at [facility] where she has resided since February after a hospitalization with hallucinations and need for higher level of care than family can provide.     Today, Ms. Napoles is seen sitting in her wheelchair self propelling with her left leg. She is a limited historian due to dementia. Oriented to self. Tells me no aches or pains. No acute concerns today per discussion with nursing.     ALLERGIES:    Allergies   Allergen Reactions    Bethanechol     Hydroxyzine Other (See Comments)     Extreme sedation     Pentazocine Unknown        Past Medical, Surgical, Family and Social History: Reviewed and updated in EPIC.    MEDICATIONS:  Current Outpatient Medications   Medication Sig Dispense Refill    acetaminophen (TYLENOL) 325 MG tablet Take 2 tablets (650 mg) by mouth every 4 hours as needed for mild pain or other (and adjunct with moderate or severe pain or per patient request)      polyethylene glycol (MIRALAX) 17 GM/Dose powder Take 17 g by mouth daily      senna-docusate (SENOKOT-S/PERICOLACE) 8.6-50 MG tablet Take 1 tablet by mouth 2 times daily as needed for constipation       Medications reviewed:  Medications reconciled to facility chart and changes were made to reflect current medications as identified as above med list. Below are the changes that were made:   Medications stopped since last EPIC medication reconciliation:   There are no discontinued medications.  Medications started since last [facility] medication reconciliation:  No orders of the defined types were placed in this encounter.      REVIEW OF  "SYSTEMS:  Unable to be obtained due to cognitive impairment or aphasia.     PHYSICAL EXAM:  BP (!) 143/73   Pulse 89   Temp (!) 95.3  F (35.2  C)   Resp 18   Ht 1.473 m (4' 10\")   Wt 37.4 kg (82 lb 6.4 oz)   SpO2 95%   BMI 17.22 kg/m    Gen: sitting in wheelchair, alert, cooperative and in no acute distress  Resp: breathing non labored, no tachypnea   Ext: no LE edema  Neuro: CX II-XII grossly in tact; ROM in all four extremities grossly in tact  Psych: memory, judgement and insight impaired     LABS/IMAGING: Reviewed as per Epic and/or [facility]    ASSESSMENT / PLAN:    HTN  SBPs labile 100s-140s, most 100s-110s. Amlodipine discontinued during hospitalization and no indication for starting anti-HTN medications today.   -- follow BPs and add medications as needed    Dementia Without Behavioral Disturbance  Failure to Thrive  Was having hallucinations prior to hospitalization. Needed higher level of care. Has settled in well. BIMS 0/15. Weight stable around 82 lbs.   -- ongoing 24/7 nursing and supportive cares     Slow Transit Constipation  -- Miralax 17g daily and Senna-S 1 tab BID PRN  -- adjust bowel regimen as needed    Electronically signed by  Kimmie Amador MD          " Metronidazole Pregnancy And Lactation Text: This medication is Pregnancy Category B and considered safe during pregnancy.  It is also excreted in breast milk.

## 2024-05-09 NOTE — LETTER
May 9, 2024      SONNY QUINONEZ  C/O JOHNATHAN STACY  [address]   [place] [address]      Dear Sonny:    Michela, my name is FRANKI Hicks, RN, PHN, [facility]. You are eligible for [facility] s Case Management program through your enrollment in [employer]. We think you may benefit from this program. I am writing to invite you to be in our Case Management program.     The following are a few things the Case Management program can help you with:  Select or change your primary care doctor or primary care clinic  Find a specialist, if needed, near your home  Receive preventive care, such as flu shots  Join programs offered by [facility] that interest you, like wellness programs    As your , I will do the following to enroll you in the Case Management Program:  Schedule a telephone call with you to answer any questions you may have about case management  Conduct an assessment by phone to identify needs case management can help you with  Develop a care plan to address those needs  Help you obtain available care and resources as needed    I will call you soon to discuss your interest in this program and your health care needs.    Being in the Case Management program is voluntary and offered to you at no cost. If you accept being in the Case Management program, you can stop any time by calling me at [phone].    Sincerely,      FRANKI Hicks, RN, PHN, [facility]  [phone]  [email or web address]    [id number]                                     (01/2020)          [address] [place] [address], [place] [address]  [phone]  fax [phone]  [facility]

## 2024-05-09 NOTE — PROGRESS NOTES
[facility] Care Coordination Contact    Member became effective with [facility] on 5/1/2024 with [employer].     Welcome letter sent to Friend Beena LAZCANO.     Melinda Perez  Care Management Specialist   [facility]   [phone]

## 2024-05-21 NOTE — PROGRESS NOTES
[employer] enrollment date: 5/1/24    Received new [employer] member enrollment, reviewed EPIC notes.  Member was hospitalized at [facility] on feb 15 for failure to thrive and generalized weakness.  She was discharged to TCU.  Unfortunately, she was not able to make progress and was transferred to [facility].  According to notes she has had multiple falls, and staff has implemented fall precautions but due to her dementia is difficult to keep in her chair.   She has ongoing 24 hour care therefore, Member will not be opened to active case management at this time. CM will follow up as needed.      FRANKI Hicks, RN, PHN, CCM  Care Coord[place] [address]  [email or web address]   [email or web address]     Office: [phone]   Fax: [phone]

## 2024-06-19 NOTE — PROGRESS NOTES
This patient's medication list and chart were reviewed as part of the service provided by [facility].    Assessment/Recommendations:    On minimal meds.  History of TIA - not on antiplatelet, likely due to goals of care, falls.  No recommendations for changes at this time.    Estimated Creatinine Clearance: 29.8 mL/min (based on SCr of 0.8 mg/dL).        Kadie Fitzgerald, Pharm.D.,[facility]  Board Certified Geriatric Pharmacist  Medication Therapy Management Pharmacist  [phone]

## 2024-06-28 NOTE — LETTER
" 6/28/2024      Helen Napoles  C/o Beena Mayer  [address]   [place] [address]        [facility] GERIATRICS    Chief Complaint   Patient presents with     Nursing Home Acute     HPI:  Helen Napoles is a 88 year old  (1935), who is being seen today for an episodic care visit at: [facility] () [[id number]].     Today's concern is:   Notified by nursing that patient has been guarding her left knee, it appears to be painful. This has been going on for at least a week. She had a similar presentation in April, likely inflammatory arthritis, that responded well to prednisone. She is nonverbal due to dementia. Currently sleeping in bed with her knees pulled up. She is wearing pants with a tight ankle that cannot be pulled up over her knee.     Allergies, and PMH/PSH reviewed in EPIC today.  REVIEW OF SYSTEMS:  Unobtainable secondary to aphasia.     Objective:   BP (!) 145/81   Pulse 100   Temp (!) 96.3  F (35.7  C)   Resp 18   Ht 1.473 m (4' 10\")   Wt 36.2 kg (79 lb 12.8 oz)   SpO2 98%   BMI 16.68 kg/m    GENERAL APPEARANCE:  resting comfortably in bed  M/S:   unable to examine knee, but even with her pants on, the left does appear larger than the right      Assessment/Plan:  (M25.562) Acute pain of left knee  (primary encounter diagnosis)  Comment: Likely inflammatory arthritis. During previous flare, uric acid was WNL. Will treat with prednisone again      Orders:  Prednisone 40mg daily x 5 days      Electronically signed by: SOPHIA Peck CNP           Sincerely,        SOPHIA Peck CNP      "

## 2024-06-28 NOTE — PROGRESS NOTES
"[facility] GERIATRICS    Chief Complaint   Patient presents with    Nursing Home Acute     HPI:  Helen Napoles is a 88 year old  (1935), who is being seen today for an episodic care visit at: [facility] () [[id number]].     Today's concern is:   Notified by nursing that patient has been guarding her left knee, it appears to be painful. This has been going on for at least a week. She had a similar presentation in April, likely inflammatory arthritis, that responded well to prednisone. She is nonverbal due to dementia. Currently sleeping in bed with her knees pulled up. She is wearing pants with a tight ankle that cannot be pulled up over her knee.     Allergies, and PMH/PSH reviewed in [facility] today.  REVIEW OF SYSTEMS:  Unobtainable secondary to aphasia.     Objective:   BP (!) 145/81   Pulse 100   Temp (!) 96.3  F (35.7  C)   Resp 18   Ht 1.473 m (4' 10\")   Wt 36.2 kg (79 lb 12.8 oz)   SpO2 98%   BMI 16.68 kg/m    GENERAL APPEARANCE:  resting comfortably in bed  M/S:   unable to examine knee, but even with her pants on, the left does appear larger than the right      Assessment/Plan:  (M25.562) Acute pain of left knee  (primary encounter diagnosis)  Comment: Likely inflammatory arthritis. During previous flare, uric acid was WNL. Will treat with prednisone again      Orders:  Prednisone 40mg daily x 5 days      Electronically signed by: SOPHIA Peck CNP       "

## 2024-07-08 NOTE — PROGRESS NOTES
[facility] GERIATRICS  Chief Complaint   Patient presents with    LIGIA     [place] Medical Record Number:  [id number]  Place of Service where encounter took place:  [facility] () [[id number]]    HPI:    Helen Napoles  is 88 year old (1935), who is being seen today for a federally mandated E/M visit.     Today's concerns are:  Acute pain of left knee  Patient was treated with prednisone in April for a swollen, painful left knee. Xray at that time showed DJD and small effusion. Prednisone was again ordered 6/28 due to left knee pain. Patient is nonverbal. Staff had noted that she was guarding her knee, not letting anyone touch it. The nurse that is on today is not typically on this floor, she has not heard of any concerns with Helen    Severe late onset Alzheimer's dementia without behavioral disturbance, psychotic disturbance, mood disturbance, or anxiety (H)  Resident is totally dependent on staff for: Bed/chair repositioning, ADL's, feeding, toileting (incontinent of bowel and bladder), transfers (mechanical lift).  Resident is non-verbal    Primary hypertension  Recently -150s/70-80s. Not currently on medications      ALLERGIES:Bethanechol, Hydroxyzine, and Pentazocine  PAST MEDICAL HISTORY:   Past Medical History:   Diagnosis Date    Anxiety     Benign essential HTN     Cataract     Cerebral vascular accident (H)     tia    Cervical spondylosis     Cervicalgia     Chest pain     Family history of myocardial infarction     Mom 58    Fatigue     Headache     High cholesterol     HLD (hyperlipidemia)     Limb pain     Osteoarthritis     Osteoarthritis     Osteopenia     Palpitations      PAST SURGICAL HISTORY:   has a past surgical history that includes TOTAL ABDOM HYSTERECTOMY and Oophorectomy.  FAMILY HISTORY: family history includes Cancer (age of onset: 60.00) in her mother; Cerebrovascular Disease in her father; Heart Disease in her mother; Ovarian Cancer (age of onset:  22.00) in her maternal aunt.  SOCIAL HISTORY:  reports that she has never smoked. She has never used smokeless tobacco. She reports that she does not drink alcohol and does not use drugs.    MEDICATIONS:  Current Outpatient Medications   Medication Sig Dispense Refill    acetaminophen (TYLENOL) 325 MG tablet Take 2 tablets (650 mg) by mouth every 4 hours as needed for mild pain or other (and adjunct with moderate or severe pain or per patient request)      polyethylene glycol (MIRALAX) 17 GM/Dose powder Take 17 g by mouth daily      senna-docusate (SENOKOT-S/PERICOLACE) 8.6-50 MG tablet Take 1 tablet by mouth 2 times daily as needed for constipation           Case Management:  I have reviewed the care plan and MDS and do agree with the plan. Information reviewed:  Medications, vital signs, orders, and nursing notes.    ROS:  Unobtainable secondary to cognitive impairment.     Vitals:  /74   Pulse 88   Temp (!) 96.6  F (35.9  C)   Resp 20   Wt 34.9 kg (77 lb)   SpO2 97%   BMI 16.09 kg/m    Body mass index is 16.09 kg/m .  Exam:  GENERAL APPEARANCE:  Patient sleeping, opened eyes only slightly to voice, cachectic, no apparent distress  RESP:  no respiratory distress  M/S:   Patient is wearing pants, but no visible swelling at left knee, she did not grimace when knee palpated    Lab/Diagnostic data:   Recent labs in [facility] reviewed by me today.     ASSESSMENT/PLAN  (M25.562) Acute pain of left knee  (primary encounter diagnosis)  Comment: Appeared to be improved during visit, but later the nurse manager called triage to report ongoing knee pain. Will consult ortho to see if a cortisone injection may be of benefit. In the meantime, will schedule some pain medication rather than restarting prednisone  Plan: Change acetaminophen to 650mg TID. Diclofenac gel 2g to left knee TID.     (G30.1,  F02.C0) Severe late onset Alzheimer's dementia without behavioral disturbance, psychotic disturbance, mood disturbance, or  anxiety (H)  Comment: Chronic, progressive. Requires 24 hour skilled nursing care. HPI/ROS unobtainable due to cognitive impairment. Expect further functional and cognitive decline. Expect weight loss.  Plan: Continue 24 hour care. Monitor weight. Monitor functional status. Monitor for behavioral disturbances.    (I10) Primary hypertension  Comment: Not currently on treatment. BP may be more elevated recently due to pain. Will address pain first and monitor BP. To avoid risk of hypotension, falls, dizziness and tissue hypoperfusion, recommend  BP goal is < 150/90mmHg.      Orders:  Change acetaminophen to 650mg TID  Diclofenac gel 2g to left knee TID      Electronically signed by:  SOPHIA Peck CNP

## 2024-07-08 NOTE — LETTER
7/8/2024      Helen Napoles  C/o Beena Mayer  [address]   [place] [address]        [facility] GERIATRICS  Chief Complaint   Patient presents with     LEONELACARLOS     [place] Medical Record Number:  [id number]  Place of Service where encounter took place:  [facility] () [[id number]]    HPI:    Helen Napoles  is 88 year old (1935), who is being seen today for a federally mandated E/M visit.     Today's concerns are:  Acute pain of left knee  Patient was treated with prednisone in April for a swollen, painful left knee. Xray at that time showed DJD and small effusion. Prednisone was again ordered 6/28 due to left knee pain. Patient is nonverbal. Staff had noted that she was guarding her knee, not letting anyone touch it. The nurse that is on today is not typically on this floor, she has not heard of any concerns with Helen    Severe late onset Alzheimer's dementia without behavioral disturbance, psychotic disturbance, mood disturbance, or anxiety (H)  Resident is totally dependent on staff for: Bed/chair repositioning, ADL's, feeding, toileting (incontinent of bowel and bladder), transfers (mechanical lift).  Resident is non-verbal    Primary hypertension  Recently -150s/70-80s. Not currently on medications      ALLERGIES:Bethanechol, Hydroxyzine, and Pentazocine  PAST MEDICAL HISTORY:   Past Medical History:   Diagnosis Date     Anxiety      Benign essential HTN      Cataract      Cerebral vascular accident (H)     tia     Cervical spondylosis      Cervicalgia      Chest pain      Family history of myocardial infarction     Mom 58     Fatigue      Headache      High cholesterol      HLD (hyperlipidemia)      Limb pain      Osteoarthritis      Osteoarthritis      Osteopenia      Palpitations      PAST SURGICAL HISTORY:   has a past surgical history that includes TOTAL ABDOM HYSTERECTOMY and Oophorectomy.  FAMILY HISTORY: family history includes Cancer (age of onset:  60.00) in her mother; Cerebrovascular Disease in her father; Heart Disease in her mother; Ovarian Cancer (age of onset: 22.00) in her maternal aunt.  SOCIAL HISTORY:  reports that she has never smoked. She has never used smokeless tobacco. She reports that she does not drink alcohol and does not use drugs.    MEDICATIONS:  Current Outpatient Medications   Medication Sig Dispense Refill     acetaminophen (TYLENOL) 325 MG tablet Take 2 tablets (650 mg) by mouth every 4 hours as needed for mild pain or other (and adjunct with moderate or severe pain or per patient request)       polyethylene glycol (MIRALAX) 17 GM/Dose powder Take 17 g by mouth daily       senna-docusate (SENOKOT-S/PERICOLACE) 8.6-50 MG tablet Take 1 tablet by mouth 2 times daily as needed for constipation           Case Management:  I have reviewed the care plan and MDS and do agree with the plan. Information reviewed:  Medications, vital signs, orders, and nursing notes.    ROS:  Unobtainable secondary to cognitive impairment.     Vitals:  /74   Pulse 88   Temp (!) 96.6  F (35.9  C)   Resp 20   Wt 34.9 kg (77 lb)   SpO2 97%   BMI 16.09 kg/m    Body mass index is 16.09 kg/m .  Exam:  GENERAL APPEARANCE:  Patient sleeping, opened eyes only slightly to voice, cachectic, no apparent distress  RESP:  no respiratory distress  M/S:   Patient is wearing pants, but no visible swelling at left knee, she did not grimace when knee palpated    Lab/Diagnostic data:   Recent labs in [facility] reviewed by me today.     ASSESSMENT/PLAN  (M25.562) Acute pain of left knee  (primary encounter diagnosis)  Comment: Appeared to be improved during visit, but later the nurse manager called triage to report ongoing knee pain. Will consult ortho to see if a cortisone injection may be of benefit. In the meantime, will schedule some pain medication rather than restarting prednisone  Plan: Change acetaminophen to 650mg TID. Diclofenac gel 2g to left knee TID.     (G30.1,   F02.C0) Severe late onset Alzheimer's dementia without behavioral disturbance, psychotic disturbance, mood disturbance, or anxiety (H)  Comment: Chronic, progressive. Requires 24 hour skilled nursing care. HPI/ROS unobtainable due to cognitive impairment. Expect further functional and cognitive decline. Expect weight loss.  Plan: Continue 24 hour care. Monitor weight. Monitor functional status. Monitor for behavioral disturbances.    (I10) Primary hypertension  Comment: Not currently on treatment. BP may be more elevated recently due to pain. Will address pain first and monitor BP. To avoid risk of hypotension, falls, dizziness and tissue hypoperfusion, recommend  BP goal is < 150/90mmHg.      Orders:  Change acetaminophen to 650mg TID  Diclofenac gel 2g to left knee TID      Electronically signed by:  SOPHIA Peck CNP            Sincerely,        SOPHIA Peck CNP

## 2024-07-11 NOTE — TELEPHONE ENCOUNTER
[facility] Triage Nurse Telephone Encounter    Provider: SOPHIA Serrano CNP   Facility: [facility] Facility Type:  LT    Caller: Gin    Allergies:    Allergies   Allergen Reactions    Bethanechol     Hydroxyzine Other (See Comments)     Extreme sedation     Pentazocine Unknown        Reason for call: Pt reports knee is really bothersome. Requesting increase of Tylenol dose.     Verbal Order/Direction given by Provider:   - Tylenol 1000mg PO TID and 650mg PO daily PRN for pain    Provider giving Order:  SOPHIA Serrano CNP     Verbal Order given to: Gin Luque RN

## 2024-07-24 NOTE — TELEPHONE ENCOUNTER
[facility] Geriatrics Triage Nurse Telephone Encounter    Provider: SOPHIA Serrano CNP   Facility: [facility] Facility Type:  LTC    Caller: Ember  Call Back Number: [phone]    Allergies:    Allergies   Allergen Reactions    Bethanechol     Hydroxyzine Other (See Comments)     Extreme sedation     Pentazocine Unknown        Reason for call: Patient had a small emesis this morning about 10 AM, no abdominal pain or distention and bowel sounds positive.  Last BM was on 7/21.  COVID-19 negative /60 pulse 68 respirations 18 temp 98.0 98% on room air.     Verbal Order/Direction given by Provider: Continue to monitor call if any worsening symptoms or more vomiting    Provider giving Order:  SOPHIA Serrano CNP     Verbal Order given to: Ember Chew RN

## 2024-09-05 NOTE — LETTER
9/5/2024      Helen Napoles  C/o Beena Mayer  [address] [place] [address]        [facility] GERIATRICS  REGULATORY VISIT  September 5, 2024      [facility] Medical Record Number:  [id number]  Place of Service where encounter took place:  [facility] () [[id number]]    Chief Complaint   Patient presents with     shelter Regulatory       HPI:    Helen Napoles is a 89 year old  (1935), who is being seen today for a federally mandated E/M visit  at [facility] where she has resided since February after a hospitalization with hallucinations and need for higher level of care than family can provide. She is enrolled with [facility]     Today, Ms. Napoles is seen sitting in her wheelchair self propelling using the wall bumper and her feet.. She is a limited historian due to dementia (BIMS 0/15). No acute concerns today per discussion with nursing.       ALLERGIES:    Allergies   Allergen Reactions     Bethanechol      Hydroxyzine Other (See Comments)     Extreme sedation      Pentazocine Unknown       Past Medical, Surgical, Family and Social History: Reviewed and updated in EPIC.    MEDICATIONS:  Current Outpatient Medications   Medication Sig Dispense Refill     acetaminophen (TYLENOL) 500 MG tablet Take 1,000 mg by mouth 3 times daily May also take 650mg PO daily PRN       acetaminophen (TYLENOL) 650 MG suppository Place 650 mg rectally daily as needed for fever.       bisacodyl (DULCOLAX) 10 MG suppository Place 10 mg rectally daily as needed for constipation.       diclofenac (VOLTAREN) 1 % topical gel Apply 2 g topically 3 times daily To left knee       hyoscyamine (LEVSIN/SL) 0.125 MG sublingual tablet Place 0.125 mg under the tongue every 4 hours as needed for cramping.       morphine sulfate 20 MG/5ML SOLN Take 5 mg by mouth every 2 hours as needed.       senna-docusate (SENOKOT-S/PERICOLACE) 8.6-50 MG tablet Take 1 tablet by mouth 2 times  "daily as needed for constipation (Patient taking differently: Take 1 tablet by mouth daily. And 1 tab BID PRN)       Medications reviewed:  Medications reconciled to facility chart and changes were made to reflect current medications as identified as above med list. Below are the changes that were made:   Medications stopped since last EPIC medication reconciliation:   Medications Discontinued During This Encounter   Medication Reason     polyethylene glycol (MIRALAX) 17 GM/Dose powder      Medications started since last [facility] medication reconciliation:  Orders Placed This Encounter   Medications     acetaminophen (TYLENOL) 650 MG suppository     Sig: Place 650 mg rectally daily as needed for fever.     hyoscyamine (LEVSIN/SL) 0.125 MG sublingual tablet     Sig: Place 0.125 mg under the tongue every 4 hours as needed for cramping.     morphine sulfate 20 MG/5ML SOLN     Sig: Take 5 mg by mouth every 2 hours as needed.     bisacodyl (DULCOLAX) 10 MG suppository     Sig: Place 10 mg rectally daily as needed for constipation.     REVIEW OF SYSTEMS:  Unable to be obtained due to cognitive impairment or aphasia.     PHYSICAL EXAM:  /53   Pulse 58   Temp 97.2  F (36.2  C)   Resp 18   Ht 1.473 m (4' 10\")   Wt 30.9 kg (68 lb 3.2 oz)   SpO2 95%   BMI 14.25 kg/m    Gen: sitting in wheelchair, alert and in no acute distress  Resp: breathing non labored, no tachypnea  Ext: no LE edema  Neuro: CX II-XII grossly in tact; ROM in all four extremities grossly in tact  Psych: memory, judgement and insight impaired    LAB[facility]IM[facility]: [facility]    ASSESSMENT / PLAN:    Dementia Without Behavioral Disturbance  Failure to Thrive  Protein Calorie Malnutrition  Hospice Care Patient  Was having hallucinations prior to hospitalization. Needed higher level of care. Has settled in well. BIMS 0/15. Weight 82 --> 68 lbs.   -- ongoing 24/7 nursing and supportive cares   -- appreciate the cares of the " hospice team    HTN  SBPs labile 100s-150s. Amlodipine discontinued during hospitalization and no indication for starting anti-HTN medications today.   -- follow BPs and add medications as needed      Electronically signed by  Kimmie Amador MD              Sincerely,        Kimmie Amador MD

## 2024-09-05 NOTE — PROGRESS NOTES
[facility] GERIATRICS  REGULATORY VISIT  September 5, 2024      [facility] Medical Record Number:  [id number]  Place of Service where encounter took place:  [facility] () [[id number]]    Chief Complaint   Patient presents with    longterm Regulatory       HPI:    Helen Napoles is a 89 year old  (1935), who is being seen today for a federally mandated E/M visit  at [facility] where she has resided since February after a hospitalization with hallucinations and need for higher level of care than family can provide. She is enrolled with [facility]     Today, Ms. Napoles is seen sitting in her wheelchair self propelling using the wall bumper and her feet.. She is a limited historian due to dementia (BIMS 0/15). No acute concerns today per discussion with nursing.       ALLERGIES:    Allergies   Allergen Reactions    Bethanechol     Hydroxyzine Other (See Comments)     Extreme sedation     Pentazocine Unknown       Past Medical, Surgical, Family and Social History: Reviewed and updated in EPIC.    MEDICATIONS:  Current Outpatient Medications   Medication Sig Dispense Refill    acetaminophen (TYLENOL) 500 MG tablet Take 1,000 mg by mouth 3 times daily May also take 650mg PO daily PRN      acetaminophen (TYLENOL) 650 MG suppository Place 650 mg rectally daily as needed for fever.      bisacodyl (DULCOLAX) 10 MG suppository Place 10 mg rectally daily as needed for constipation.      diclofenac (VOLTAREN) 1 % topical gel Apply 2 g topically 3 times daily To left knee      hyoscyamine (LEVSIN/SL) 0.125 MG sublingual tablet Place 0.125 mg under the tongue every 4 hours as needed for cramping.      morphine sulfate 20 MG/5ML SOLN Take 5 mg by mouth every 2 hours as needed.      senna-docusate (SENOKOT-S/PERICOLACE) 8.6-50 MG tablet Take 1 tablet by mouth 2 times daily as needed for constipation (Patient taking differently: Take 1 tablet by mouth daily. And 1 tab BID PRN)    "    Medications reviewed:  Medications reconciled to facility chart and changes were made to reflect current medications as identified as above med list. Below are the changes that were made:   Medications stopped since last EPIC medication reconciliation:   Medications Discontinued During This Encounter   Medication Reason    polyethylene glycol (MIRALAX) 17 GM/Dose powder      Medications started since last [facility] medication reconciliation:  Orders Placed This Encounter   Medications    acetaminophen (TYLENOL) 650 MG suppository     Sig: Place 650 mg rectally daily as needed for fever.    hyoscyamine (LEVSIN/SL) 0.125 MG sublingual tablet     Sig: Place 0.125 mg under the tongue every 4 hours as needed for cramping.    morphine sulfate 20 MG/5ML SOLN     Sig: Take 5 mg by mouth every 2 hours as needed.    bisacodyl (DULCOLAX) 10 MG suppository     Sig: Place 10 mg rectally daily as needed for constipation.     REVIEW OF SYSTEMS:  Unable to be obtained due to cognitive impairment or aphasia.     PHYSICAL EXAM:  /53   Pulse 58   Temp 97.2  F (36.2  C)   Resp 18   Ht 1.473 m (4' 10\")   Wt 30.9 kg (68 lb 3.2 oz)   SpO2 95%   BMI 14.25 kg/m    Gen: sitting in wheelchair, alert and in no acute distress  Resp: breathing non labored, no tachypnea  Ext: no LE edema  Neuro: CX II-XII grossly in tact; ROM in all four extremities grossly in tact  Psych: memory, judgement and insight impaired    LAB[facility]IMAGING: [facility]    ASSESSMENT / PLAN:    Dementia Without Behavioral Disturbance  Failure to Thrive  Protein Calorie Malnutrition  Hospice Care Patient  Was having hallucinations prior to hospitalization. Needed higher level of care. Has settled in well. BIMS 0/15. Weight 82 --> 68 lbs.   -- ongoing 24/7 nursing and supportive cares   -- appreciate the cares of the hospice team    HTN  SBPs labile 100s-150s. Amlodipine discontinued during hospitalization and no indication for starting " anti-HTN medications today.   -- follow BPs and add medications as needed      Electronically signed by  Kimmie Amador MD

## 2024-09-06 PROBLEM — E46 PROTEIN-CALORIE MALNUTRITION (H): Status: ACTIVE | Noted: 2024-01-01

## 2024-11-04 ENCOUNTER — DOCUMENTATION ONLY (OUTPATIENT)
Dept: GERIATRICS | Facility: CLINIC | Age: 89
End: 2024-11-04
Payer: COMMERCIAL

## 2024-11-04 ENCOUNTER — PATIENT OUTREACH (OUTPATIENT)
Dept: GERIATRIC MEDICINE | Facility: CLINIC | Age: 89
End: 2024-11-04
Payer: COMMERCIAL

## 2024-11-04 NOTE — PROGRESS NOTES
Notified by Epic  that member passed away on 1024/24 at Nursing Facility.    PCP notified. Called all providers to cancel services.     Optional: Called family member/caregiver to offer condolences.   For [facility]:  Death Notification form completed and faxed to [facility].    Chart reviewed and this CC's encounter closed. Chart handed off to CMS to process disenrollment tasks.    FRANKI Hicks, RN, PHN, [facility]  Care Coordinator-Long Term Care  [address] [address]  [email or web address]   [email or web address]     Office: [phone]   Fax: [phone]